# Patient Record
Sex: MALE | Race: ASIAN | NOT HISPANIC OR LATINO | Employment: OTHER | URBAN - METROPOLITAN AREA
[De-identification: names, ages, dates, MRNs, and addresses within clinical notes are randomized per-mention and may not be internally consistent; named-entity substitution may affect disease eponyms.]

---

## 2017-03-18 ENCOUNTER — HOME CARE/HOSPICE - HEALTHEAST (OUTPATIENT)
Dept: HOME HEALTH SERVICES | Facility: HOME HEALTH | Age: 67
End: 2017-03-18

## 2017-03-19 ENCOUNTER — HOME CARE/HOSPICE - HEALTHEAST (OUTPATIENT)
Dept: HOME HEALTH SERVICES | Facility: HOME HEALTH | Age: 67
End: 2017-03-19

## 2017-03-20 ENCOUNTER — HOME CARE/HOSPICE - HEALTHEAST (OUTPATIENT)
Dept: HOME HEALTH SERVICES | Facility: HOME HEALTH | Age: 67
End: 2017-03-20

## 2017-03-20 ENCOUNTER — COMMUNICATION - HEALTHEAST (OUTPATIENT)
Dept: NEUROSURGERY | Facility: CLINIC | Age: 67
End: 2017-03-20

## 2017-03-20 DIAGNOSIS — I61.0 BASAL GANGLIA HEMORRHAGE (H): ICD-10-CM

## 2017-03-21 ENCOUNTER — HOME CARE/HOSPICE - HEALTHEAST (OUTPATIENT)
Dept: HOME HEALTH SERVICES | Facility: HOME HEALTH | Age: 67
End: 2017-03-21

## 2017-03-24 ENCOUNTER — HOME CARE/HOSPICE - HEALTHEAST (OUTPATIENT)
Dept: HOME HEALTH SERVICES | Facility: HOME HEALTH | Age: 67
End: 2017-03-24

## 2017-03-27 ENCOUNTER — HOME CARE/HOSPICE - HEALTHEAST (OUTPATIENT)
Dept: HOME HEALTH SERVICES | Facility: HOME HEALTH | Age: 67
End: 2017-03-27

## 2017-03-29 ENCOUNTER — HOME CARE/HOSPICE - HEALTHEAST (OUTPATIENT)
Dept: HOME HEALTH SERVICES | Facility: HOME HEALTH | Age: 67
End: 2017-03-29

## 2017-03-31 ENCOUNTER — HOME CARE/HOSPICE - HEALTHEAST (OUTPATIENT)
Dept: HOME HEALTH SERVICES | Facility: HOME HEALTH | Age: 67
End: 2017-03-31

## 2017-03-31 ENCOUNTER — AMBULATORY - HEALTHEAST (OUTPATIENT)
Dept: NEUROSURGERY | Facility: CLINIC | Age: 67
End: 2017-03-31

## 2017-03-31 ENCOUNTER — HOSPITAL ENCOUNTER (OUTPATIENT)
Dept: CT IMAGING | Facility: CLINIC | Age: 67
Discharge: HOME | End: 2017-03-31
Attending: NEUROLOGICAL SURGERY

## 2017-03-31 ENCOUNTER — OFFICE VISIT - HEALTHEAST (OUTPATIENT)
Dept: NEUROSURGERY | Facility: CLINIC | Age: 67
End: 2017-03-31

## 2017-03-31 DIAGNOSIS — I61.0 BASAL GANGLIA HEMORRHAGE (H): ICD-10-CM

## 2017-03-31 ASSESSMENT — MIFFLIN-ST. JEOR: SCORE: 1564.59

## 2017-04-01 ENCOUNTER — HOME CARE/HOSPICE - HEALTHEAST (OUTPATIENT)
Dept: HOME HEALTH SERVICES | Facility: HOME HEALTH | Age: 67
End: 2017-04-01

## 2017-04-03 ENCOUNTER — HOME CARE/HOSPICE - HEALTHEAST (OUTPATIENT)
Dept: HOME HEALTH SERVICES | Facility: HOME HEALTH | Age: 67
End: 2017-04-03

## 2017-04-04 ENCOUNTER — HOME CARE/HOSPICE - HEALTHEAST (OUTPATIENT)
Dept: HOME HEALTH SERVICES | Facility: HOME HEALTH | Age: 67
End: 2017-04-04

## 2017-04-05 ENCOUNTER — HOME CARE/HOSPICE - HEALTHEAST (OUTPATIENT)
Dept: HOME HEALTH SERVICES | Facility: HOME HEALTH | Age: 67
End: 2017-04-05

## 2017-04-06 ENCOUNTER — HOME CARE/HOSPICE - HEALTHEAST (OUTPATIENT)
Dept: HOME HEALTH SERVICES | Facility: HOME HEALTH | Age: 67
End: 2017-04-06

## 2017-04-10 ENCOUNTER — HOME CARE/HOSPICE - HEALTHEAST (OUTPATIENT)
Dept: HOME HEALTH SERVICES | Facility: HOME HEALTH | Age: 67
End: 2017-04-10

## 2017-04-12 ENCOUNTER — HOME CARE/HOSPICE - HEALTHEAST (OUTPATIENT)
Dept: HOME HEALTH SERVICES | Facility: HOME HEALTH | Age: 67
End: 2017-04-12

## 2017-04-14 ENCOUNTER — HOME CARE/HOSPICE - HEALTHEAST (OUTPATIENT)
Dept: HOME HEALTH SERVICES | Facility: HOME HEALTH | Age: 67
End: 2017-04-14

## 2017-04-19 ENCOUNTER — HOME CARE/HOSPICE - HEALTHEAST (OUTPATIENT)
Dept: HOME HEALTH SERVICES | Facility: HOME HEALTH | Age: 67
End: 2017-04-19

## 2017-04-21 ENCOUNTER — HOME CARE/HOSPICE - HEALTHEAST (OUTPATIENT)
Dept: HOME HEALTH SERVICES | Facility: HOME HEALTH | Age: 67
End: 2017-04-21

## 2017-04-22 ENCOUNTER — HOME CARE/HOSPICE - HEALTHEAST (OUTPATIENT)
Dept: HOME HEALTH SERVICES | Facility: HOME HEALTH | Age: 67
End: 2017-04-22

## 2017-04-26 ENCOUNTER — HOME CARE/HOSPICE - HEALTHEAST (OUTPATIENT)
Dept: HOME HEALTH SERVICES | Facility: HOME HEALTH | Age: 67
End: 2017-04-26

## 2017-04-27 ENCOUNTER — HOME CARE/HOSPICE - HEALTHEAST (OUTPATIENT)
Dept: HOME HEALTH SERVICES | Facility: HOME HEALTH | Age: 67
End: 2017-04-27

## 2017-05-04 ENCOUNTER — HOME CARE/HOSPICE - HEALTHEAST (OUTPATIENT)
Dept: HOME HEALTH SERVICES | Facility: HOME HEALTH | Age: 67
End: 2017-05-04

## 2017-05-09 ENCOUNTER — COMMUNICATION - HEALTHEAST (OUTPATIENT)
Dept: HOME HEALTH SERVICES | Facility: HOME HEALTH | Age: 67
End: 2017-05-09

## 2017-05-30 ENCOUNTER — HOME CARE/HOSPICE - HEALTHEAST (OUTPATIENT)
Dept: HOME HEALTH SERVICES | Facility: HOME HEALTH | Age: 67
End: 2017-05-30

## 2017-06-01 ENCOUNTER — HOME CARE/HOSPICE - HEALTHEAST (OUTPATIENT)
Dept: HOME HEALTH SERVICES | Facility: HOME HEALTH | Age: 67
End: 2017-06-01

## 2017-06-04 ENCOUNTER — HOME CARE/HOSPICE - HEALTHEAST (OUTPATIENT)
Dept: HOME HEALTH SERVICES | Facility: HOME HEALTH | Age: 67
End: 2017-06-04

## 2017-06-05 ENCOUNTER — COMMUNICATION - HEALTHEAST (OUTPATIENT)
Dept: HOME HEALTH SERVICES | Facility: HOME HEALTH | Age: 67
End: 2017-06-05

## 2017-06-06 ENCOUNTER — HOME CARE/HOSPICE - HEALTHEAST (OUTPATIENT)
Dept: HOME HEALTH SERVICES | Facility: HOME HEALTH | Age: 67
End: 2017-06-06

## 2017-06-07 ENCOUNTER — HOME CARE/HOSPICE - HEALTHEAST (OUTPATIENT)
Dept: HOME HEALTH SERVICES | Facility: HOME HEALTH | Age: 67
End: 2017-06-07

## 2017-06-08 ENCOUNTER — HOME CARE/HOSPICE - HEALTHEAST (OUTPATIENT)
Dept: HOME HEALTH SERVICES | Facility: HOME HEALTH | Age: 67
End: 2017-06-08

## 2017-06-08 RX ORDER — ATORVASTATIN CALCIUM 10 MG/1
10 TABLET, FILM COATED ORAL AT BEDTIME
Status: SHIPPED | COMMUNITY
Start: 2017-06-08 | End: 2024-04-11

## 2017-06-09 ENCOUNTER — HOME CARE/HOSPICE - HEALTHEAST (OUTPATIENT)
Dept: HOME HEALTH SERVICES | Facility: HOME HEALTH | Age: 67
End: 2017-06-09

## 2017-06-16 ENCOUNTER — HOME CARE/HOSPICE - HEALTHEAST (OUTPATIENT)
Dept: HOME HEALTH SERVICES | Facility: HOME HEALTH | Age: 67
End: 2017-06-16

## 2017-06-17 ENCOUNTER — HOME CARE/HOSPICE - HEALTHEAST (OUTPATIENT)
Dept: HOME HEALTH SERVICES | Facility: HOME HEALTH | Age: 67
End: 2017-06-17

## 2017-06-20 ENCOUNTER — HOME CARE/HOSPICE - HEALTHEAST (OUTPATIENT)
Dept: HOME HEALTH SERVICES | Facility: HOME HEALTH | Age: 67
End: 2017-06-20

## 2017-06-21 ENCOUNTER — HOME CARE/HOSPICE - HEALTHEAST (OUTPATIENT)
Dept: HOME HEALTH SERVICES | Facility: HOME HEALTH | Age: 67
End: 2017-06-21

## 2017-06-23 ENCOUNTER — HOME CARE/HOSPICE - HEALTHEAST (OUTPATIENT)
Dept: HOME HEALTH SERVICES | Facility: HOME HEALTH | Age: 67
End: 2017-06-23

## 2017-06-24 ENCOUNTER — HOME CARE/HOSPICE - HEALTHEAST (OUTPATIENT)
Dept: HOME HEALTH SERVICES | Facility: HOME HEALTH | Age: 67
End: 2017-06-24

## 2017-06-25 ENCOUNTER — HOME CARE/HOSPICE - HEALTHEAST (OUTPATIENT)
Dept: HOME HEALTH SERVICES | Facility: HOME HEALTH | Age: 67
End: 2017-06-25

## 2017-06-27 ENCOUNTER — HOME CARE/HOSPICE - HEALTHEAST (OUTPATIENT)
Dept: HOME HEALTH SERVICES | Facility: HOME HEALTH | Age: 67
End: 2017-06-27

## 2017-06-30 ENCOUNTER — HOME CARE/HOSPICE - HEALTHEAST (OUTPATIENT)
Dept: HOME HEALTH SERVICES | Facility: HOME HEALTH | Age: 67
End: 2017-06-30

## 2017-07-01 ENCOUNTER — HOME CARE/HOSPICE - HEALTHEAST (OUTPATIENT)
Dept: HOME HEALTH SERVICES | Facility: HOME HEALTH | Age: 67
End: 2017-07-01

## 2017-07-03 ENCOUNTER — HOME CARE/HOSPICE - HEALTHEAST (OUTPATIENT)
Dept: HOME HEALTH SERVICES | Facility: HOME HEALTH | Age: 67
End: 2017-07-03

## 2017-07-06 ENCOUNTER — HOME CARE/HOSPICE - HEALTHEAST (OUTPATIENT)
Dept: HOME HEALTH SERVICES | Facility: HOME HEALTH | Age: 67
End: 2017-07-06

## 2017-07-07 ENCOUNTER — HOME CARE/HOSPICE - HEALTHEAST (OUTPATIENT)
Dept: HOME HEALTH SERVICES | Facility: HOME HEALTH | Age: 67
End: 2017-07-07

## 2020-05-12 ENCOUNTER — RECORDS - HEALTHEAST (OUTPATIENT)
Dept: ADMINISTRATIVE | Facility: OTHER | Age: 70
End: 2020-05-12

## 2020-05-27 ENCOUNTER — HOSPITAL ENCOUNTER (OUTPATIENT)
Dept: ULTRASOUND IMAGING | Facility: HOSPITAL | Age: 70
Discharge: HOME | End: 2020-05-27

## 2020-05-27 DIAGNOSIS — R42 DIZZINESS AND GIDDINESS: ICD-10-CM

## 2021-05-25 ENCOUNTER — RECORDS - HEALTHEAST (OUTPATIENT)
Dept: ADMINISTRATIVE | Facility: CLINIC | Age: 71
End: 2021-05-25

## 2021-05-30 VITALS — BODY MASS INDEX: 33.63 KG/M2 | WEIGHT: 197 LBS | HEIGHT: 64 IN

## 2021-06-02 ENCOUNTER — RECORDS - HEALTHEAST (OUTPATIENT)
Dept: ADMINISTRATIVE | Facility: CLINIC | Age: 71
End: 2021-06-02

## 2021-06-04 ENCOUNTER — RECORDS - HEALTHEAST (OUTPATIENT)
Dept: ADMINISTRATIVE | Facility: CLINIC | Age: 71
End: 2021-06-04

## 2021-06-09 NOTE — PROGRESS NOTES
CHART NOTE     DATE OF SERVICE:  3/31/2017     : 1950     ASSESSMENT :   Resolving R basal ganglia hemorrhage. Slowly improving function.      PLAN: No: ASA, NSAIDS (Ibuprofen, Aleve, Diclofenac, Celebrex etc.), Coumadin, or blood thinners for one month. Okay to take Tylenol. Continue all therapies. See  primary provider regularly and maintain good blood pressure control. Return to clinic only as needed.       HPI:  Sedrick Murray is status post CONSULT by Dr. Hinojosa on 3-16-17 for RIGHT BASAL GANGLIA HEMORRHAGE  Patient w/  medical history significant for hypertension, dyslipidemia, CAD, gout and history of hemorrhagic stroke who presented to the ED with Left facial droop and Left-sided weakness.  CT head showed Right basal ganglia bleed. MR COW done, no aneurysms noted.     TODAY, Sedrick Murray reports c/o persistent HA. Some nausea associated with eating. L sided weakness is improving, still weak. Cognition and memory not back to baseline, spouse agrees. Patient is at home with HHC, PT/OT, SLP.      PAST MEDICAL HISTORY, SURGICAL HISTORY, REVIEW OF SYMPTOMS, MEDICATIONS AND ALLERGIES:  Past medical history, surgical history, review of symptoms, medications and allergies remain unchanged.    Past Medical History:   Diagnosis Date     Anxiety      Chest pain      Chronic pain      Diarrhea      Dyslipidemia 2014     Gout      Hemorrhagic stroke      Hypertension        PHYSICAL EXAM:      Neurological exam reveals:  Recent and remote memory intact, fund of knowledge wnl.    Alert and oriented x3, speech fluent and appropriate.   PERRL, EOMI, No nystagmus, Face with mild left facial droop, tongue midline, Shoulder shrug equal  Comprehension intact with 2 step crossover command.   Finger nose finger smooth and accurate with RUE, L dysmetria  Arm strength grasp, biceps, triceps, and deltoids 5/5 RUE, weaker on LUE particularly grasp  Leg strength dorsiflexion, plantar flexion, and hip flexion 5/5 weaker on LLE  but good resistance.  Muscle Bulk and tone wnl.   Reflexes: No pathological reflexes   Gait and station:in WC not observed     RADIOGRAPHIC IMAGING:  Head CT: 1. Expected evolution of area of right basal ganglia hemorrhage. No finding for interval hemorrhage, mass, or acute infarct. Ventricles are stable in size.    Films personally reviewed.  Reviewed  with patient and family.

## 2021-06-15 PROBLEM — R53.1 LEFT-SIDED WEAKNESS: Status: ACTIVE | Noted: 2017-05-30

## 2021-06-15 PROBLEM — I61.0 BASAL GANGLIA HEMORRHAGE (H): Status: ACTIVE | Noted: 2017-03-15

## 2021-06-15 PROBLEM — I63.9 CVA (CEREBRAL VASCULAR ACCIDENT) (H): Status: ACTIVE | Noted: 2017-05-28

## 2021-06-16 PROBLEM — R55 SYNCOPE, UNSPECIFIED SYNCOPE TYPE: Status: ACTIVE | Noted: 2020-05-06

## 2021-06-16 PROBLEM — M10.9 ACUTE GOUT OF MULTIPLE SITES: Status: ACTIVE | Noted: 2017-06-01

## 2021-07-14 PROBLEM — I62.9 INTRACRANIAL HEMORRHAGE (H): Status: RESOLVED | Noted: 2017-03-15 | Resolved: 2017-03-16

## 2022-09-02 ENCOUNTER — APPOINTMENT (OUTPATIENT)
Dept: CT IMAGING | Facility: HOSPITAL | Age: 72
End: 2022-09-02
Payer: COMMERCIAL

## 2022-09-02 ENCOUNTER — HOSPITAL ENCOUNTER (EMERGENCY)
Facility: HOSPITAL | Age: 72
Discharge: HOME | End: 2022-09-02
Admitting: STUDENT IN AN ORGANIZED HEALTH CARE EDUCATION/TRAINING PROGRAM
Payer: COMMERCIAL

## 2022-09-02 ENCOUNTER — APPOINTMENT (OUTPATIENT)
Dept: RADIOLOGY | Facility: HOSPITAL | Age: 72
End: 2022-09-02
Attending: STUDENT IN AN ORGANIZED HEALTH CARE EDUCATION/TRAINING PROGRAM
Payer: COMMERCIAL

## 2022-09-02 VITALS
SYSTOLIC BLOOD PRESSURE: 162 MMHG | BODY MASS INDEX: 29.52 KG/M2 | DIASTOLIC BLOOD PRESSURE: 62 MMHG | WEIGHT: 166.67 LBS | TEMPERATURE: 98.4 F | OXYGEN SATURATION: 98 % | RESPIRATION RATE: 20 BRPM | HEART RATE: 58 BPM
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
## 2022-09-02 DIAGNOSIS — M25.522 LEFT ELBOW PAIN: ICD-10-CM

## 2022-09-02 PROCEDURE — 70450 CT HEAD/BRAIN W/O DYE: CPT

## 2022-09-02 PROCEDURE — 99284 EMERGENCY DEPT VISIT MOD MDM: CPT | Mod: 25

## 2022-09-02 PROCEDURE — 73080 X-RAY EXAM OF ELBOW: CPT | Mod: LT

## 2022-09-02 RX ORDER — OXYCODONE HYDROCHLORIDE 5 MG/1
5 TABLET ORAL ONCE
Status: DISCONTINUED | OUTPATIENT
Start: 2022-09-02 | End: 2022-09-02 | Stop reason: HOSPADM

## 2022-09-02 RX ORDER — OXYCODONE HYDROCHLORIDE 5 MG/1
5 TABLET ORAL EVERY 6 HOURS PRN
Qty: 8 TABLET | Refills: 0 | Status: SHIPPED | OUTPATIENT
Start: 2022-09-02 | End: 2024-04-11

## 2022-09-02 ASSESSMENT — ENCOUNTER SYMPTOMS
NAUSEA: 0
BACK PAIN: 0
HEADACHES: 0
FACIAL ASYMMETRY: 0
SHORTNESS OF BREATH: 0
WEAKNESS: 0
VOMITING: 0
DIZZINESS: 0
NUMBNESS: 0
ABDOMINAL PAIN: 0
NECK PAIN: 0

## 2022-09-02 NOTE — ED PROVIDER NOTES
ED Provider In Triage Note  [facility]  Encounter Date: Sep 2, 2022    No chief complaint on file.      Brief HPI:   Sedrick Murray is a 72 year old male presenting to the Emergency Department with a chief complaint of L elbow pain. Fell this morning    No head injury. Fell walking up the stairs        Brief Physical Exam:  BP (!) 162/62   Pulse 61   Temp 98.4  F (36.9  C) (Oral)   Resp 16   Wt 75.6 kg (166 lb 10.7 oz)   SpO2 96%   BMI 29.52 kg/m    General: Non-toxic appearing  HEENT: Atraumatic  Resp: No respiratory distress  Abdomen: Non-peritoneal  Neuro: Alert, oriented, answers questions appropriately  Psych: Behavior appropriate      Plan Initiated in Triage:  No orders of the defined types were placed in this encounter.      PIT Dispo:   Return to lobby while awaiting workup and ED bed availability    Chandu Cueva DO on 9/2/2022 at 4:25 PM    Patient was evaluated by the Physician in Triage due to a limitation of available rooms in the Emergency Department. A plan of care was discussed based on the information obtained on the initial evaluation and patient was consuled to return back to the Emergency Department lobby after this initial evalutaiton until results were obtained or a room became available in the Emergency Department. Patient was counseled not to leave prior to receiving the results of their workup.     Chandu Cueva DO  [facility] EMERGENCY DEPARTMENT  [address] [place] [id number]  [phone]     Chandu Cueva DO  09/02/22 4830

## 2022-09-02 NOTE — ED TRIAGE NOTES
Pt states he was walking upstairs this morning when his legs gave out and he fell down several stairs and landed on his left elbow. Pt is c/o left elbow pain and swelling. Pt states he has left-sided deficits from a 2017 CVA. Pt denies hitting head. Pt denies taking any blood thinners. Pt denies taking anything for the pain.     Triage Assessment     Row Name 09/02/22 1630       Triage Assessment (Adult)    Airway WDL WDL       Respiratory WDL    Respiratory WDL WDL       Skin Circulation/Temperature WDL    Skin Circulation/Temperature WDL WDL       Cardiac WDL    Cardiac WDL WDL       Peripheral/Neurovascular WDL    Peripheral Neurovascular WDL WDL       Cognitive/Neuro/Behavioral WDL    Cognitive/Neuro/Behavioral WDL WDL

## 2022-09-02 NOTE — ED PROVIDER NOTES
Emergency Department Encounter   NAME: Sedrick Murray ; AGE: 72 year old male ; YOB: 1950 ; MRN: [id number] ; PCP: Milvia Costa   ED PROVIDER: Alie Langston PA-C    Evaluation Date & Time:   No admission date for patient encounter.    CHIEF COMPLAINT:  Fall and Left elbow injury      Impression and Plan   MDM: Sedrick Murray is a 72 year old male with a pertinent history of hemorrhagic stroke, hypertension, gout, dyslipidemia, anxiety, who presents to the ED by private vehicle for evaluation of left elbow pain.  Patient presented to the emergency department today for evaluation of left elbow pain after he tripped down several stairs, fell forward, landed on his left elbow.  Fall was mechanical in nature.  He states that his left elbow broke the fall he does not believe that there was head impact or any other injuries.  He does have significant swelling and bruising overlying the olecranon.  Distal CMS is intact.  The entirety of the arm was palpated with no other areas of tenderness or deformity.  X-ray of the elbow did not show underlying fracture, subluxation, dislocation or joint effusion to suggest an occult fracture which is quite reassuring.  Patient encouraged to perform range of motion and is able to fully flex and extend at the elbow joint which is reassuring.  Patient placed in an Ace wrap as well as a sling and we discussed icing, elevation, Tylenol, and several tablets of oxycodone for pain.  I performed a thorough trauma exam on the patient which was otherwise unremarkable.  He states that he is on a blood thinner medication, however he is not sure of the name and I cannot find documentation of this on his medication list, however given the nature of the fall did recommend head CT which she was comfortable moving forward with.  This was negative for traumatic intracranial hemorrhage.  We reviewed concerning signs and symptoms return to the ER as well as follow-up in clinic.  He verbalized  understanding is comfortable with the plan.  Discharged home in good condition.  Plan to have his blood pressure rechecked at his follow-up appointment.    *All conversations today had using a professional [employer] interpretor.     *Patient examination and workup was initiated in triage due to inpatient hospital and Emergency Department bed shortage resulting in long waiting room wait times. Patient and/or guardian's consent was obtained.     ED COURSE:  4:58 PM I met and introduced myself to the patient. I gathered initial history and performed my physical exam. We discussed plan for initial workup.   7:29 PM rechecked the patient and updated him on results, plan for discharge, follow-up, and reasons to return to the ER.    At the conclusion of the encounter I discussed the results of all the tests and the disposition. The questions were answered. The patient or family acknowledged understanding and was agreeable with the care plan.    FINAL IMPRESSION:    ICD-10-CM    1. Fall, initial encounter  W19.XXXA    2. Left elbow pain  M25.522          MEDICATIONS GIVEN IN THE EMERGENCY DEPARTMENT:  Medications   oxyCODONE (ROXICODONE) tablet 5 mg (5 mg Oral Not Given 9/2/22 2021)         NEW PRESCRIPTIONS STARTED AT TODAY'S ED VISIT:  New Prescriptions    OXYCODONE (ROXICODONE) 5 MG TABLET    Take 1 tablet (5 mg) by mouth every 6 hours as needed for pain         HPI   Patient information was obtained from: Patient    Use of Intrepreter: Yes - Sahra interpretor - Nicole     Sedrick Murray is a 72 year old male with a pertinent history of hemorrhagic stroke, hypertension, gout, dyslipidemia, anxiety, who presents to the ED by private vehicle for evaluation of left elbow pain.     The patient presents to the emergency department after he fell down the stairs and injured his elbow.  He states he has chronic left-sided weakness after previous stroke, and he accidentally tripped when walking down the stairs.  He tripped down  several stairs, ultimately leaning forward with the majority of his weight on his left elbow on the pavement.  He denies hitting his head, headache, or LOC.  He has not had any visual or speech changes, vomiting, arm or leg weakness or numbness, or neck pain since injury.  He denies any pain into the shoulder, chest, or back and just has localized pain to the elbow.  No radiation of pain into the forearm, wrist, or hand.  He is right-hand dominant.  Patient states he is on a blood thinner medication though does not know the name of the medicine.  He did not take it this morning.      REVIEW OF SYSTEMS:  Review of Systems   Eyes: Negative for visual disturbance.   Respiratory: Negative for shortness of breath.    Cardiovascular: Negative for chest pain.   Gastrointestinal: Negative for abdominal pain, nausea and vomiting.   Musculoskeletal: Negative for back pain and neck pain.        Positive for left elbow pain.    Neurological: Negative for dizziness, syncope, facial asymmetry, weakness, numbness and headaches.   All other systems reviewed and are negative.        Medical History     Past Medical History:   Diagnosis Date     Anxiety      Back pain      Cerebral vascular accident (H)      Chest pain      Chronic pain      Diarrhea      Dyslipidemia 9/1/2014     Gout      Hemorrhagic stroke (H)      Hypertension        Past Surgical History:   Procedure Laterality Date     CHOLECYSTECTOMY       IR MISCELLANEOUS PROCEDURE  3/9/2013     IR MISCELLANEOUS PROCEDURE  3/9/2013       Family History   Problem Relation Age of Onset     Cerebrovascular Disease Mother        Social History     Tobacco Use     Smoking status: Never Smoker   Substance Use Topics     Alcohol use: Yes     Drug use: No       oxyCODONE (ROXICODONE) 5 MG tablet  amLODIPine (NORVASC) 5 MG tablet  aspirin 81 MG EC tablet  atorvastatin (LIPITOR) 10 MG tablet  oxyCODONE (ROXICODONE) 5 MG immediate release tablet          Physical Exam     First  Vitals:  Patient Vitals for the past 24 hrs:   BP Temp Temp src Pulse Resp SpO2 Weight   09/02/22 1629 (!) 162/62 98.4  F (36.9  C) Oral 61 16 96 % 75.6 kg (166 lb 10.7 oz)         PHYSICAL EXAM:   Physical Exam  Vitals and nursing note reviewed.   Constitutional:       General: He is not in acute distress.     Appearance: Normal appearance. He is not toxic-appearing.   HENT:      Head: Normocephalic and atraumatic.      Right Ear: Tympanic membrane normal.      Left Ear: Tympanic membrane normal.      Mouth/Throat:      Mouth: Mucous membranes are moist.      Pharynx: Oropharynx is clear.   Eyes:      Extraocular Movements: Extraocular movements intact.      Conjunctiva/sclera: Conjunctivae normal.      Pupils: Pupils are equal, round, and reactive to light.   Neck:      Comments: No midline cervical spinal tenderness or palpable bony step-offs.  Forage motion of the neck without difficulty or pain.  No pain with axial loading.  Cardiovascular:      Rate and Rhythm: Normal rate and regular rhythm.      Heart sounds: Normal heart sounds.   Pulmonary:      Effort: Pulmonary effort is normal.      Breath sounds: Normal breath sounds.   Chest:      Chest wall: No tenderness.   Abdominal:      General: Abdomen is flat. There is no distension.      Palpations: Abdomen is soft.      Tenderness: There is no abdominal tenderness. There is no guarding or rebound.   Musculoskeletal:      Cervical back: Normal range of motion and neck supple.      Comments: No midline spinal tenderness or palpable bony step-offs.  Pelvis is stable.  Left elbow is significantly swollen with ecchymosis and diffuse tenderness over the olecranon.  Arm held in a flexed position at the elbow joint.  Extension deferred due to his pain.  No tenderness into the shoulder joint or humerus.  No forearm, wrist, or hand tenderness.  2+ radial pulses and distal cap refill is less than 2 seconds to all fingers.  Hand is warm.  Sensation to light touch intact  to all fingers.  5 out of 5 strength with .  No open wounds.   Skin:     General: Skin is warm and dry.   Neurological:      Mental Status: He is alert and oriented to person, place, and time. Mental status is at baseline.      GCS: GCS eye subscore is 4. GCS verbal subscore is 5. GCS motor subscore is 6.      Cranial Nerves: No facial asymmetry.      Comments: Normal speech.  No appreciated dysarthria or aphasia.  Cranial nerves III through XII intact.  Sensation to light touch intact to face and all extremities.             Results     LAB:  All pertinent labs reviewed and interpreted  Labs Ordered and Resulted from Time of ED Arrival to Time of ED Departure - No data to display    RADIOLOGY:  Head CT w/o contrast   Final Result   IMPRESSION:   1.  No acute intracranial process.      Elbow  XR, G/E 3 views, left   Final Result   IMPRESSION:    1.  No fracture or joint malalignment.   2.  Advanced left elbow degenerative arthrosis.   3.  Multiple degenerative ossicles at the medial, anterior, and lateral margin of the elbow joint. Olecranon spur.           Alie Langston PA-C   Emergency Medicine   [facility] EMERGENCY DEPARTMENT       Alie Langston PA-C  09/02/22 2025

## 2022-09-03 NOTE — DISCHARGE INSTRUCTIONS
As we discussed, your x-ray shows no broken bones, however you do have a bruised elbow.  Please elevate the arm above heart level when able, ice the elbow for 20 minutes every 2-3 hours, and use Tylenol as needed for discomfort.  I would also keep the area compressed with an Ace wrap.  We will send you home with a sling though I would like you to take the arm out of the sling several times a day and perform range of motion exercises of your shoulder to avoid stiffening of her muscles.  I will send you home with several tablets of oxycodone which is a strong pain medication for breakthrough pain.  This medicine can make you drowsy.  Please do not take this while working, driving, or operating heavy machinery.    Your blood pressure was elevated in the emergencydepartment today and requires recheck and close follow-up in your primary care clinic. Untreated blood pressure can cause serious complications including, but not limited to stroke, heart attack/failure, and kidney disease.  Please make a close follow-up appointment to have this recheck performed. Please return to the emergency department immediately if you develop a severe headache, vision changes, chest pain, shortness of breath, orabdominal pain.

## 2023-11-13 ENCOUNTER — ALLIED HEALTH/NURSE VISIT (OUTPATIENT)
Dept: FAMILY MEDICINE | Facility: CLINIC | Age: 73
End: 2023-11-13
Payer: COMMERCIAL

## 2023-11-13 DIAGNOSIS — Z23 NEED FOR PROPHYLACTIC VACCINATION AGAINST HEPATITIS B VIRUS: ICD-10-CM

## 2023-11-13 PROCEDURE — G0010 ADMIN HEPATITIS B VACCINE: HCPCS

## 2023-11-13 PROCEDURE — 99207 PR NO CHARGE NURSE ONLY: CPT

## 2023-11-13 PROCEDURE — 90480 ADMN SARSCOV2 VAC 1/ONLY CMP: CPT

## 2023-11-13 PROCEDURE — 90746 HEPB VACCINE 3 DOSE ADULT IM: CPT

## 2023-11-13 PROCEDURE — 91320 SARSCV2 VAC 30MCG TRS-SUC IM: CPT

## 2023-11-13 NOTE — PROGRESS NOTES
Prior to immunization administration, verified patients identity using patient s name and date of birth. Please see Immunization Activity for additional information.     Screening Questionnaire for Adult Immunization    Are you sick today?   No   Do you have allergies to medications, food, a vaccine component or latex?   No   Have you ever had a serious reaction after receiving a vaccination?   No   Do you have a long-term health problem with heart, lung, kidney, or metabolic disease (e.g., diabetes), asthma, a blood disorder, no spleen, complement component deficiency, a cochlear implant, or a spinal fluid leak?  Are you on long-term aspirin therapy?   No   Do you have cancer, leukemia, HIV/AIDS, or any other immune system problem?   No   Do you have a parent, brother, or sister with an immune system problem?   No   In the past 3 months, have you taken medications that affect  your immune system, such as prednisone, other steroids, or anticancer drugs; drugs for the treatment of rheumatoid arthritis, Crohn s disease, or psoriasis; or have you had radiation treatments?   No   Have you had a seizure, or a brain or other nervous system problem?   No   During the past year, have you received a transfusion of blood or blood    products, or been given immune (gamma) globulin or antiviral drug?   No   For women: Are you pregnant or is there a chance you could become       pregnant during the next month?   No   Have you received any vaccinations in the past 4 weeks?   No     Immunization questionnaire answers were all negative.    I have reviewed the following standing orders:   This patient is due and qualifies for the Hepatitis B vaccine.    Click here for Hepatitis B Standing Order    I have reviewed the vaccines inclusion and exclusion criteria; Spoke with daughter, she stated they were to get Hep B here in clinic and will get the other requested vaccines at [facility]    Patient instructed to remain in clinic for 15  minutes afterwards, and to report any adverse reactions.     Screening performed by Helena Thomas MA on 11/13/2023 at 9:31 AM.

## 2023-12-26 ENCOUNTER — TRANSFERRED RECORDS (OUTPATIENT)
Dept: HEALTH INFORMATION MANAGEMENT | Facility: CLINIC | Age: 73
End: 2023-12-26

## 2024-04-11 ENCOUNTER — APPOINTMENT (OUTPATIENT)
Dept: CT IMAGING | Facility: HOSPITAL | Age: 74
DRG: 280 | End: 2024-04-11
Attending: EMERGENCY MEDICINE
Payer: COMMERCIAL

## 2024-04-11 ENCOUNTER — HOSPITAL ENCOUNTER (INPATIENT)
Facility: HOSPITAL | Age: 74
LOS: 4 days | Discharge: HOME | DRG: 280 | End: 2024-04-15
Attending: EMERGENCY MEDICINE | Admitting: INTERNAL MEDICINE
Payer: COMMERCIAL

## 2024-04-11 ENCOUNTER — APPOINTMENT (OUTPATIENT)
Dept: CARDIOLOGY | Facility: HOSPITAL | Age: 74
DRG: 280 | End: 2024-04-11
Attending: INTERNAL MEDICINE
Payer: COMMERCIAL

## 2024-04-11 DIAGNOSIS — I21.4 NSTEMI (NON-ST ELEVATED MYOCARDIAL INFARCTION) (H): ICD-10-CM

## 2024-04-11 DIAGNOSIS — R07.9 ACUTE CHEST PAIN: ICD-10-CM

## 2024-04-11 DIAGNOSIS — R09.02 HYPOXIA: ICD-10-CM

## 2024-04-11 DIAGNOSIS — I50.31 ACUTE DIASTOLIC HEART FAILURE (H): Primary | ICD-10-CM

## 2024-04-11 PROBLEM — Z86.73 HISTORY OF CVA (CEREBROVASCULAR ACCIDENT): Status: ACTIVE | Noted: 2024-04-11

## 2024-04-11 LAB
ANION GAP SERPL CALCULATED.3IONS-SCNC: 11 MMOL/L (ref 7–15)
APTT PPP: 27 SECONDS (ref 22–38)
BASOPHILS # BLD AUTO: 0 10E3/UL (ref 0–0.2)
BASOPHILS NFR BLD AUTO: 0 %
BUN SERPL-MCNC: 30.2 MG/DL (ref 8–23)
CALCIUM SERPL-MCNC: 8.5 MG/DL (ref 8.8–10.2)
CHLORIDE SERPL-SCNC: 101 MMOL/L (ref 98–107)
CHOLEST SERPL-MCNC: 210 MG/DL
CREAT SERPL-MCNC: 1.04 MG/DL (ref 0.67–1.17)
DEPRECATED HCO3 PLAS-SCNC: 26 MMOL/L (ref 22–29)
EGFRCR SERPLBLD CKD-EPI 2021: 76 ML/MIN/1.73M2
EOSINOPHIL # BLD AUTO: 0.1 10E3/UL (ref 0–0.7)
EOSINOPHIL NFR BLD AUTO: 1 %
ERYTHROCYTE [DISTWIDTH] IN BLOOD BY AUTOMATED COUNT: 13.2 % (ref 10–15)
GLUCOSE SERPL-MCNC: 226 MG/DL (ref 70–99)
HCT VFR BLD AUTO: 49.2 % (ref 40–53)
HDLC SERPL-MCNC: 80 MG/DL
HGB BLD-MCNC: 16.1 G/DL (ref 13.3–17.7)
HOLD SPECIMEN: NORMAL
HOLD SPECIMEN: NORMAL
IMM GRANULOCYTES # BLD: 0.2 10E3/UL
IMM GRANULOCYTES NFR BLD: 2 %
INR PPP: 0.99 (ref 0.85–1.15)
LACTATE SERPL-SCNC: 1.2 MMOL/L (ref 0.7–2)
LDLC SERPL CALC-MCNC: 61 MG/DL
LIPASE SERPL-CCNC: 183 U/L (ref 13–60)
LVEF ECHO: NORMAL
LYMPHOCYTES # BLD AUTO: 0.8 10E3/UL (ref 0.8–5.3)
LYMPHOCYTES NFR BLD AUTO: 7 %
MCH RBC QN AUTO: 30.7 PG (ref 26.5–33)
MCHC RBC AUTO-ENTMCNC: 32.7 G/DL (ref 31.5–36.5)
MCV RBC AUTO: 94 FL (ref 78–100)
MONOCYTES # BLD AUTO: 0.8 10E3/UL (ref 0–1.3)
MONOCYTES NFR BLD AUTO: 6 %
NEUTROPHILS # BLD AUTO: 10.5 10E3/UL (ref 1.6–8.3)
NEUTROPHILS NFR BLD AUTO: 84 %
NONHDLC SERPL-MCNC: 130 MG/DL
NRBC # BLD AUTO: 0 10E3/UL
NRBC BLD AUTO-RTO: 0 /100
NT-PROBNP SERPL-MCNC: 1801 PG/ML (ref 0–900)
PLATELET # BLD AUTO: 122 10E3/UL (ref 150–450)
POTASSIUM SERPL-SCNC: 4.1 MMOL/L (ref 3.4–5.3)
RBC # BLD AUTO: 5.25 10E6/UL (ref 4.4–5.9)
SODIUM SERPL-SCNC: 138 MMOL/L (ref 135–145)
TRIGL SERPL-MCNC: 343 MG/DL
TROPONIN T SERPL HS-MCNC: 47 NG/L
TROPONIN T SERPL HS-MCNC: 49 NG/L
TROPONIN T SERPL HS-MCNC: 60 NG/L
UFH PPP CHRO-ACNC: 0.82 IU/ML
WBC # BLD AUTO: 12.4 10E3/UL (ref 4–11)

## 2024-04-11 PROCEDURE — 120N000004 HC R&B MS OVERFLOW

## 2024-04-11 PROCEDURE — 250N000013 HC RX MED GY IP 250 OP 250 PS 637: Performed by: INTERNAL MEDICINE

## 2024-04-11 PROCEDURE — 80048 BASIC METABOLIC PNL TOTAL CA: CPT | Performed by: EMERGENCY MEDICINE

## 2024-04-11 PROCEDURE — 250N000011 HC RX IP 250 OP 636: Performed by: INTERNAL MEDICINE

## 2024-04-11 PROCEDURE — 83605 ASSAY OF LACTIC ACID: CPT | Performed by: INTERNAL MEDICINE

## 2024-04-11 PROCEDURE — 250N000013 HC RX MED GY IP 250 OP 250 PS 637: Performed by: EMERGENCY MEDICINE

## 2024-04-11 PROCEDURE — 80061 LIPID PANEL: CPT | Performed by: INTERNAL MEDICINE

## 2024-04-11 PROCEDURE — 84484 ASSAY OF TROPONIN QUANT: CPT | Performed by: INTERNAL MEDICINE

## 2024-04-11 PROCEDURE — 85730 THROMBOPLASTIN TIME PARTIAL: CPT | Performed by: EMERGENCY MEDICINE

## 2024-04-11 PROCEDURE — 99223 1ST HOSP IP/OBS HIGH 75: CPT | Performed by: INTERNAL MEDICINE

## 2024-04-11 PROCEDURE — 36415 COLL VENOUS BLD VENIPUNCTURE: CPT | Performed by: INTERNAL MEDICINE

## 2024-04-11 PROCEDURE — 85610 PROTHROMBIN TIME: CPT | Performed by: EMERGENCY MEDICINE

## 2024-04-11 PROCEDURE — 85520 HEPARIN ASSAY: CPT | Performed by: INTERNAL MEDICINE

## 2024-04-11 PROCEDURE — 93306 TTE W/DOPPLER COMPLETE: CPT | Mod: 26 | Performed by: INTERNAL MEDICINE

## 2024-04-11 PROCEDURE — 71275 CT ANGIOGRAPHY CHEST: CPT

## 2024-04-11 PROCEDURE — 93005 ELECTROCARDIOGRAM TRACING: CPT | Performed by: EMERGENCY MEDICINE

## 2024-04-11 PROCEDURE — 85041 AUTOMATED RBC COUNT: CPT | Performed by: EMERGENCY MEDICINE

## 2024-04-11 PROCEDURE — 250N000011 HC RX IP 250 OP 636: Performed by: EMERGENCY MEDICINE

## 2024-04-11 PROCEDURE — 96374 THER/PROPH/DIAG INJ IV PUSH: CPT | Mod: 59

## 2024-04-11 PROCEDURE — 83880 ASSAY OF NATRIURETIC PEPTIDE: CPT | Performed by: EMERGENCY MEDICINE

## 2024-04-11 PROCEDURE — 84484 ASSAY OF TROPONIN QUANT: CPT | Performed by: EMERGENCY MEDICINE

## 2024-04-11 PROCEDURE — 36415 COLL VENOUS BLD VENIPUNCTURE: CPT | Performed by: EMERGENCY MEDICINE

## 2024-04-11 PROCEDURE — 99285 EMERGENCY DEPT VISIT HI MDM: CPT

## 2024-04-11 PROCEDURE — 83690 ASSAY OF LIPASE: CPT | Performed by: EMERGENCY MEDICINE

## 2024-04-11 PROCEDURE — 74177 CT ABD & PELVIS W/CONTRAST: CPT

## 2024-04-11 PROCEDURE — 93306 TTE W/DOPPLER COMPLETE: CPT

## 2024-04-11 RX ORDER — ONDANSETRON 2 MG/ML
4 INJECTION INTRAMUSCULAR; INTRAVENOUS EVERY 6 HOURS PRN
Status: DISCONTINUED | OUTPATIENT
Start: 2024-04-11 | End: 2024-04-15 | Stop reason: HOSPADM

## 2024-04-11 RX ORDER — ACETAMINOPHEN 325 MG/1
650 TABLET ORAL EVERY 4 HOURS PRN
Status: DISCONTINUED | OUTPATIENT
Start: 2024-04-11 | End: 2024-04-15 | Stop reason: HOSPADM

## 2024-04-11 RX ORDER — ASPIRIN 81 MG/1
81 TABLET ORAL DAILY
COMMUNITY
Start: 2024-01-08

## 2024-04-11 RX ORDER — PROCHLORPERAZINE 25 MG
12.5 SUPPOSITORY, RECTAL RECTAL EVERY 12 HOURS PRN
Status: DISCONTINUED | OUTPATIENT
Start: 2024-04-11 | End: 2024-04-15 | Stop reason: HOSPADM

## 2024-04-11 RX ORDER — HYDRALAZINE HYDROCHLORIDE 20 MG/ML
10 INJECTION INTRAMUSCULAR; INTRAVENOUS EVERY 4 HOURS PRN
Status: DISCONTINUED | OUTPATIENT
Start: 2024-04-11 | End: 2024-04-15 | Stop reason: HOSPADM

## 2024-04-11 RX ORDER — ONDANSETRON 4 MG/1
4 TABLET, ORALLY DISINTEGRATING ORAL EVERY 6 HOURS PRN
Status: DISCONTINUED | OUTPATIENT
Start: 2024-04-11 | End: 2024-04-15 | Stop reason: HOSPADM

## 2024-04-11 RX ORDER — AMOXICILLIN 250 MG
2 CAPSULE ORAL 2 TIMES DAILY PRN
Status: DISCONTINUED | OUTPATIENT
Start: 2024-04-11 | End: 2024-04-15 | Stop reason: HOSPADM

## 2024-04-11 RX ORDER — FENTANYL CITRATE 50 UG/ML
50 INJECTION, SOLUTION INTRAMUSCULAR; INTRAVENOUS ONCE
Status: COMPLETED | OUTPATIENT
Start: 2024-04-11 | End: 2024-04-11

## 2024-04-11 RX ORDER — ROSUVASTATIN CALCIUM 20 MG/1
20 TABLET, COATED ORAL AT BEDTIME
COMMUNITY
Start: 2023-05-09 | End: 2024-04-26

## 2024-04-11 RX ORDER — TRAZODONE HYDROCHLORIDE 150 MG/1
150 TABLET ORAL
COMMUNITY

## 2024-04-11 RX ORDER — CALCIUM CARBONATE 500 MG/1
1000 TABLET, CHEWABLE ORAL 4 TIMES DAILY PRN
Status: DISCONTINUED | OUTPATIENT
Start: 2024-04-11 | End: 2024-04-15 | Stop reason: HOSPADM

## 2024-04-11 RX ORDER — AMOXICILLIN 250 MG
1 CAPSULE ORAL 2 TIMES DAILY PRN
Status: DISCONTINUED | OUTPATIENT
Start: 2024-04-11 | End: 2024-04-15 | Stop reason: HOSPADM

## 2024-04-11 RX ORDER — IOPAMIDOL 755 MG/ML
90 INJECTION, SOLUTION INTRAVASCULAR ONCE
Status: COMPLETED | OUTPATIENT
Start: 2024-04-11 | End: 2024-04-11

## 2024-04-11 RX ORDER — HEPARIN SODIUM 10000 [USP'U]/100ML
0-5000 INJECTION, SOLUTION INTRAVENOUS CONTINUOUS
Status: DISCONTINUED | OUTPATIENT
Start: 2024-04-11 | End: 2024-04-12

## 2024-04-11 RX ORDER — AMLODIPINE BESYLATE 10 MG/1
10 TABLET ORAL DAILY
COMMUNITY
Start: 2024-01-08

## 2024-04-11 RX ORDER — PROCHLORPERAZINE MALEATE 5 MG
5 TABLET ORAL EVERY 6 HOURS PRN
Status: DISCONTINUED | OUTPATIENT
Start: 2024-04-11 | End: 2024-04-15 | Stop reason: HOSPADM

## 2024-04-11 RX ORDER — SENNOSIDES 8.6 MG
650 CAPSULE ORAL EVERY 8 HOURS PRN
COMMUNITY

## 2024-04-11 RX ORDER — CETIRIZINE HYDROCHLORIDE 10 MG/1
10 TABLET ORAL DAILY
COMMUNITY
End: 2024-04-11

## 2024-04-11 RX ORDER — LOSARTAN POTASSIUM 100 MG/1
100 TABLET ORAL DAILY
Status: ON HOLD | COMMUNITY
Start: 2024-02-01 | End: 2024-04-15

## 2024-04-11 RX ORDER — ALLOPURINOL 300 MG/1
300 TABLET ORAL 2 TIMES DAILY
COMMUNITY

## 2024-04-11 RX ORDER — ACETAMINOPHEN 650 MG/1
650 SUPPOSITORY RECTAL EVERY 4 HOURS PRN
Status: DISCONTINUED | OUTPATIENT
Start: 2024-04-11 | End: 2024-04-15 | Stop reason: HOSPADM

## 2024-04-11 RX ORDER — ASPIRIN 81 MG/1
324 TABLET, CHEWABLE ORAL ONCE
Status: COMPLETED | OUTPATIENT
Start: 2024-04-11 | End: 2024-04-11

## 2024-04-11 RX ORDER — MORPHINE SULFATE 2 MG/ML
2 INJECTION, SOLUTION INTRAMUSCULAR; INTRAVENOUS
Status: DISCONTINUED | OUTPATIENT
Start: 2024-04-11 | End: 2024-04-15 | Stop reason: HOSPADM

## 2024-04-11 RX ORDER — FUROSEMIDE 10 MG/ML
40 INJECTION INTRAMUSCULAR; INTRAVENOUS 2 TIMES DAILY
Qty: 8 ML | Refills: 0 | Status: COMPLETED | OUTPATIENT
Start: 2024-04-11 | End: 2024-04-12

## 2024-04-11 RX ORDER — HYDROCHLOROTHIAZIDE 25 MG/1
50 TABLET ORAL DAILY
Status: ON HOLD | COMMUNITY
End: 2024-04-15

## 2024-04-11 RX ORDER — LANOLIN ALCOHOL/MO/W.PET/CERES
3 CREAM (GRAM) TOPICAL
Status: DISCONTINUED | OUTPATIENT
Start: 2024-04-11 | End: 2024-04-15 | Stop reason: HOSPADM

## 2024-04-11 RX ORDER — POLYETHYLENE GLYCOL 3350 17 G/17G
17 POWDER, FOR SOLUTION ORAL 2 TIMES DAILY PRN
Status: DISCONTINUED | OUTPATIENT
Start: 2024-04-11 | End: 2024-04-15 | Stop reason: HOSPADM

## 2024-04-11 RX ORDER — NITROGLYCERIN 0.4 MG/1
0.4 TABLET SUBLINGUAL EVERY 5 MIN PRN
Status: DISCONTINUED | OUTPATIENT
Start: 2024-04-11 | End: 2024-04-15 | Stop reason: HOSPADM

## 2024-04-11 RX ADMIN — ACETAMINOPHEN 650 MG: 325 TABLET ORAL at 19:03

## 2024-04-11 RX ADMIN — CALCIUM CARBONATE (ANTACID) CHEW TAB 500 MG 1000 MG: 500 CHEW TAB at 21:23

## 2024-04-11 RX ADMIN — HYDRALAZINE HYDROCHLORIDE 10 MG: 20 INJECTION INTRAMUSCULAR; INTRAVENOUS at 19:39

## 2024-04-11 RX ADMIN — IOPAMIDOL 90 ML: 755 INJECTION, SOLUTION INTRAVENOUS at 14:05

## 2024-04-11 RX ADMIN — HEPARIN SODIUM 1000 UNITS/HR: 10000 INJECTION, SOLUTION INTRAVENOUS at 16:38

## 2024-04-11 RX ADMIN — NITROGLYCERIN 0.4 MG: 0.4 TABLET, ORALLY DISINTEGRATING SUBLINGUAL at 20:08

## 2024-04-11 RX ADMIN — FUROSEMIDE 40 MG: 10 INJECTION, SOLUTION INTRAMUSCULAR; INTRAVENOUS at 18:49

## 2024-04-11 RX ADMIN — FENTANYL CITRATE 50 MCG: 50 INJECTION, SOLUTION INTRAMUSCULAR; INTRAVENOUS at 13:41

## 2024-04-11 RX ADMIN — ASPIRIN 81 MG CHEWABLE TABLET 324 MG: 81 TABLET CHEWABLE at 13:11

## 2024-04-11 ASSESSMENT — ACTIVITIES OF DAILY LIVING (ADL)
ADLS_ACUITY_SCORE: 41
ADLS_ACUITY_SCORE: 35
DEPENDENT_IADLS:: CLEANING;COOKING;LAUNDRY;SHOPPING;MEAL PREPARATION;MEDICATION MANAGEMENT;TRANSPORTATION
ADLS_ACUITY_SCORE: 35
ADLS_ACUITY_SCORE: 35
ADLS_ACUITY_SCORE: 41
ADLS_ACUITY_SCORE: 37
ADLS_ACUITY_SCORE: 35
ADLS_ACUITY_SCORE: 33
ADLS_ACUITY_SCORE: 37

## 2024-04-11 ASSESSMENT — COLUMBIA-SUICIDE SEVERITY RATING SCALE - C-SSRS
1. IN THE PAST MONTH, HAVE YOU WISHED YOU WERE DEAD OR WISHED YOU COULD GO TO SLEEP AND NOT WAKE UP?: NO
2. HAVE YOU ACTUALLY HAD ANY THOUGHTS OF KILLING YOURSELF IN THE PAST MONTH?: NO
6. HAVE YOU EVER DONE ANYTHING, STARTED TO DO ANYTHING, OR PREPARED TO DO ANYTHING TO END YOUR LIFE?: NO

## 2024-04-11 NOTE — H&P
[facility]    History and Physical - Hospitalist Service       Date of Admission:  4/11/2024    Assessment & Plan   Active Problems:    Hypertension    Arteriosclerotic cardiovascular disease (ASCVD)    CAD (coronary artery disease)    Chronic gout of multiple sites, unspecified cause    NSTEMI (non-ST elevated myocardial infarction) (H)    History of CVA (cerebrovascular accident)    Dyslipidemia       Sedrick Murray is a 73 year old male with history of prior CVA, CAD, diastolic CHF, aortic valve disease, gout, admitted on 4/11/2024 with NSTEMI    NSTEMI:  Concern for acute CHF exacerbation  History of CAD, aortic regurgitation, HFpEF  Presents with chest and lower abdominal pain and found to have elevated troponin with T wave inversions on EKG. BNP elevated.   CT chest negative for PE or dissection  There was incidental finding of ulceration of the proximal SMA plaque at was seen on prior study done in 2020.  There is a incidental finding of advanced atherosclerotic disease with 3.9 x 3.1 cm infrarenal abdominal aortic aneurysm which has slightly increased in size.   Review of prior coronary angiogram notable for LAD and distal circ disease  -- start IV heparin  -- check TTE  -- cardiology consultation  -- verify home medications, continue ASA and statin and home antihypertensives  -- check lipids      Upper abdominal pain  Lipase mildly elevated but CT abdomen negative for pancreatitis.  CT does show ulceration of the proximal SMA plaque that was seen on prior study done in 2020.  There is a incidental finding of advanced atherosclerotic disease with 3.9 x 3.1 cm infrarenal abdominal aortic aneurysm which has slightly increased in size.   -- check lactic acid   -- consider inpatient vascular surgery evaluation however findings seem chronic and could be suitable for outpatient referral  -- pain control  -- try GI cocktail      Prior stroke:   -- verify home meds      H/O gout: verify home  "meds       Diet: NPO for Medical/Clinical Reasons Except for: Ice Chips, Meds    DVT Prophylaxis: IV heparin  Eugene Catheter: Not present  Lines: None     Cardiac Monitoring: ACTIVE order. Indication: AMI (NSTEMI/ STEMI) (48 hours)  Code Status: Full Code      Clinically Significant Risk Factors Present on Admission                # Drug Induced Platelet Defect: home medication list includes an antiplatelet medication   # Hypertension: Noted on problem list      # Obesity: Estimated body mass index is 33.3 kg/m  as calculated from the following:    Height as of this encounter: 1.6 m (5' 3\").    Weight as of this encounter: 85.3 kg (188 lb).              Disposition Plan      Expected Discharge Date: 04/13/2024             Medically Ready for Discharge: Anticipated in 2-4 Days      Krish Linn DO  Hospitalist Service  [facility]  Securely message with [employer] (more info)  Text page via [employer] Paging/Directory     ______________________________________________________________________    Chief Complaint   Chest pain    History is obtained from the patient    History of Present Illness   Sedrick Murray is a 73 year old male who presents with chest pain, abdominal pain, SOB, BRAVO and increased LE edema for the last several days to weeks.   He has associated orthopnea      Past Medical History    Past Medical History:   Diagnosis Date    Anxiety     Back pain     Cerebral vascular accident (H)     Chest pain     Chronic pain     Diarrhea     Dyslipidemia 9/1/2014    Gout     Hemorrhagic stroke (H)     Hypertension        Past Surgical History   Past Surgical History:   Procedure Laterality Date    CHOLECYSTECTOMY      IR MISCELLANEOUS PROCEDURE  3/9/2013    IR MISCELLANEOUS PROCEDURE  3/9/2013       Prior to Admission Medications   Prior to Admission Medications   Prescriptions Last Dose Informant Patient Reported? Taking?   amLODIPine (NORVASC) 5 MG tablet   No No   Sig: [AMLODIPINE (NORVASC) " 5 MG TABLET] Take 1 tablet (5 mg total) by mouth daily.   aspirin 81 MG EC tablet   No No   Sig: [ASPIRIN 81 MG EC TABLET] Take 1 tablet (81 mg total) by mouth daily.   atorvastatin (LIPITOR) 10 MG tablet   Yes No   Sig: [ATORVASTATIN (LIPITOR) 10 MG TABLET] Take 10 mg by mouth at bedtime. Indications: hypercholesterolemia   oxyCODONE (ROXICODONE) 5 MG immediate release tablet   No No   Sig: [OXYCODONE (ROXICODONE) 5 MG IMMEDIATE RELEASE TABLET] Take 0.5 tablets (2.5 mg total) by mouth every 3 (three) hours as needed.   oxyCODONE (ROXICODONE) 5 MG tablet   No No   Sig: Take 1 tablet (5 mg) by mouth every 6 hours as needed for pain      Facility-Administered Medications: None           Physical Exam   Vital Signs: Temp: 98.5  F (36.9  C)   BP: (!) 188/83 Pulse: 67   Resp: 15 SpO2: 96 %      Weight: 188 lbs 0 oz    General Appearance: In no acute distress  RESPIRATORY: Respirations nonlabored  CARDIOVASCULAR: S1, S2, without murmur, rub, or gallop. No le edema bilat.  ABDOMEN: slightly distended, TTP throughout  NEUROLOGIC: No focal arm or leg  weakness, speech is clear, alert    Medical Decision Making       >75 MINUTES SPENT BY ME on the date of service doing chart review, history, exam, documentation & further activities per the note.      Data

## 2024-04-11 NOTE — MEDICATION SCRIBE - ADMISSION MEDICATION HISTORY
Medication Scribe Admission Medication History    Admission medication history is complete. The information provided in this note is only as accurate as the sources available at the time of the update.    Information Source(s): Patient and Family member via in-person    Pertinent Information: patient reports management of medications with some assistance from daughter, Myles. Myles  works at [employer] as a NA on [facility]. She came down to assist with medication interview.     Patient reports no longer taking: Atorvastatin, Oxycodone, Zyrtec, Albuterol    Changes made to PTA medication list:  Added: Tylenol, Allopurinol, D3, Voltaren, Hydrochlorothiazide, Losartan, Rosuvastatin, trazodone   Deleted: Atorvastatin, Oxycodone,  Changed: Amlodipine from 5 to 10,     Allergies reviewed with patient and updates made in EHR: yes    Medication History Completed By: Manohar Cadet 4/11/2024 6:12 PM    PTA Med List   Medication Sig Last Dose    acetaminophen (TYLENOL) 650 MG CR tablet Take 650 mg by mouth every 8 hours as needed Past Week at prn    allopurinol (ZYLOPRIM) 300 MG tablet Take 300 mg by mouth 2 times daily 4/11/2024 at am    amLODIPine (NORVASC) 10 MG tablet Take 10 mg by mouth daily 4/11/2024 at am    aspirin 81 MG EC tablet Take 81 mg by mouth daily 4/11/2024 at am    CHOLECALCIFEROL 25 MCG (1000 UT) tablet Take 25 mcg by mouth daily 4/11/2024 at am    diclofenac (VOLTAREN) 1 % topical gel Apply 4 g topically 4 times daily as needed Past Month at prn    losartan (COZAAR) 100 MG tablet Take 100 mg by mouth daily 4/11/2024 at am    rosuvastatin (CRESTOR) 20 MG tablet Take 20 mg by mouth at bedtime 4/11/2024 at am    traZODone (DESYREL) 150 MG tablet Take 150 mg by mouth nightly as needed 4/10/2024 at prn

## 2024-04-11 NOTE — ED TRIAGE NOTES
Patient Hmong speaking using language line to help with communication. Patient states 2 week history of upper chest pain, some nausea. States he has had chest pain in past, denies MI. EKG being done. Weight increase of 20 pounds compared to prior weight in chart

## 2024-04-11 NOTE — CONSULTS
"Care Management Initial Consult    General Information  Assessment completed with: Patient, Spouse or significant other, Peter and wife Donovan  Type of CM/SW Visit: Initial Assessment    Primary Care Provider verified and updated as needed: Yes   Readmission within the last 30 days: no previous admission in last 30 days      Reason for Consult: discharge planning  Advance Care Planning: Advance Care Planning Reviewed: no concerns identified          Communication Assessment  Patient's communication style: spoken language (non-English) (speaks Hmong and some English)             Cognitive  Cognitive/Neuro/Behavioral: WDL                      Living Environment:   People in home: spouse, child(gregory), adult     Current living Arrangements: house      Able to return to prior arrangements: yes       Family/Social Support:  Care provided by: self, child(gregory), other (see comments) (son is his PCA)  Provides care for: no one, unable/limited ability to care for self  Marital Status:   Wife, PCA, Children  Yinelly       Description of Support System: Supportive, Involved    Support Assessment: Adequate family and caregiver support, Adequate social supports, Patient communicates needs well met    Current Resources:   Patient receiving home care services: No     Community Resources: PCA  Equipment currently used at home: cane, quad, walker, rolling  Supplies currently used at home: None    Employment/Financial:  Employment Status: retired     Employment/ Comments: \"no  history\"  Financial Concerns:     Referral to Financial Worker: No       Does the patient's insurance plan have a 3 day qualifying hospital stay waiver?  No    Functional Status:  Prior to admission patient needed assistance:   Dependent ADLs:: Ambulation-cane, Ambulation-walker, Bathing, Dressing  Dependent IADLs:: Cleaning, Cooking, Laundry, Shopping, Meal Preparation, Medication Management, Transportation  Assesssment of Functional Status: At " functional baseline    Mental Health Status:          Chemical Dependency Status:                Values/Beliefs:  Spiritual, Cultural Beliefs, Mormonism Practices, Values that affect care:                 Additional Information:  Sedrick lives in a house with his wife and kids. His son is his PCA for help with some ADLs and all IADLs. He uses a cane or walker for mobility.    He should be able to return home at discharge.     Family to transport at discharge.    CM to follow for medical progression of care, discharge recommendations, and final discharge plan.    Sulma Drake RN

## 2024-04-11 NOTE — ED PROVIDER NOTES
EMERGENCY DEPARTMENT ENCOUNTER      NAME: Sedrick Murray  AGE: 73 year old male  YOB: 1950  MRN: [id number]  EVALUATION DATE & TIME: No admission date for patient encounter.    PCP: Milvia Costa    ED PROVIDER: Janelle Bergeron M.D.      Chief Complaint   Patient presents with    Chest Pain       FINAL IMPRESSION:  1. NSTEMI (non-ST elevated myocardial infarction) (H)        ED COURSE & MEDICAL DECISION MAKING:    Chest pain, upper abdominal pain.  Differentials considered include ACS, PE, gastritis, pancreatitis, new onset CHF, esophageal spasm, dissection less likely given description of pain but still considered.   I ordered blood work including troponin, coags, lipase, bnp, cbc, hepatic panel, bmp.  EKG was reviewed by myself (done per triage protocol).   I ordered patient asa and pain medication.  I reviewed blood work which was significant for lipase of 183, troponin of 60, wbc of 12.4, glucose of 226, normal kidney function, bnp of 1801, platelets slightly low at 122k, appears chronic (platelets 135k in 2020).  Due to elevation of lipase as well as troponin elevation and chest pain and upper abdominal pain, I ordered ct PE study as well as CT A/P to further evaluate for possible causes of pain including pancreatitis, dissection, AAA.  I reviewed results from ct scans which showed no PE, no abnormalities to explain chest pain, did show ulceration of known proximal SMA plaque compared to previous study 2020. Also showed no pancreatitis but advanced atherosclerotic disease with 3.9x3.1cm infrarenal AAA which has slightly increased in size.   I spoke to Cardiology regarding plan for heparin and trending troponins. Dr. Trujillo agrees with [facility]'s echocardiogram per hospitalist.   I admitted patient to medicine.    12:12 PM I met with the patient to gather history and to perform my initial exam. I discussed the plan for care while in the Emergency Department.  3:17 PM I spoke with Cardiology,   Cassandra, who agrees with unfractionated heparin, [facility]'s echo.  3:20 PM Spoke with the Hospitalist, Dr. Linn, who accepts the patient for admission.        Pertinent Labs & Imaging studies reviewed. (See chart for details).    Medical Decision Making  Obtained supplemental history:Supplemental history obtained?: Family Member/Significant Other  Reviewed external records: External records reviewed?: Documented in chart  Care impacted by chronic illness:Cerebrovascular Disease, Chronic Pain, Diabetes, Heart Disease, Hyperlipidemia, Hypertension, and Mental Health  Care significantly affected by social determinants of health:N/A  Did you consider but not order tests?: Work up considered but not performed and documented in chart, if applicable  Did you interpret images independently?: Independent interpretation of ECG and images noted in documentation, when applicable.  Consultation discussion with other provider:Did you involve another provider (consultant, , pharmacy, etc.)?: I discussed the care with another health care provider, see documentation for details.  Admit.    At the conclusion of the encounter I discussed the results of all of the tests and the disposition. The questions were answered. The patient or family acknowledged understanding and was agreeable with the care plan.      CRITICAL CARE:  N/A    HPI    Patient information was obtained from: Patient and Patient's Wife    Use of : Yes (In Person and Phone) - Language Sahra      Sedrick Murray is a 73 year old male with a pertinent medical history of CVA, hemorrhagic stroke, basal ganglia hemorrhage, chronic pain, ASCVD, CAD, acute diastolic heart failure, aortic valve insuffiencey, HTN, HLD, T2DM, thrombocytopenic disorder, gout, anxiety, who presents to the ED for evaluation of chest pain, abdominal pain, and numbness.     Patient reports having constant generalized lower chest pain/\upper abdominal pain for approximately 2 weeks  "characterized as burning as well as \"fast and full.\" He endorses associated abdominal distention and shortness of breath. He states that his shortness of breath is worsened with laying flat on his back. He also mentions that recently he has been having progressively worsening bilateral leg swelling, as well as notes that recently he has been eating and urinating more than usual. He denies his chest pains/abdominal pains being worsened with eating. He denies any known personal or familial history of myocardial infarction. He denies any recent coughing. He additionally reports having facial numbness within the ED around his mouth with associated stiffness. He states that he feels like he is unable to talk normally secondary to his facial numbness. He denies any other complications at this time.     Patient's wife endorses the patient undergoing abdominal surgery in the past related to his kidney and/or bladder. She also mentions that recently the patient has been having generalized body swelling, not just leg swelling.     Per Chart Review: Patient was seen at [facility] on 05/09/23 for an angiography. The patient tolerated the procedure quite well. The right coronary artery was large and dominant with mild disease in the RCA itself. The posterolateral branch was medium in size and had a proximal lesion of 40%. The PDA is diffusely diseased with mild narrowing. There was nothing occlusive. The left main had mild disease of 20%. This gave rise to a medium size ramus intermediate branch with mild disease or high obtuse marginal branch. The circumflex had mild disease and was relatively small distally. The LAD gave rise to a small diagonal branch. The LAD was diffusely diseased. In the mid portion there was diffuse 50% narrowing and in the anteroapical LAD there was a 75% narrowing. The patient also had an echocardiogram performed that demonstrated preserved LV systolic function with moderate aortic valve " insufficiency. The patient clearly had significant distal LAD disease.  Patient was deemed to need titration of his antianginal medications and to be diuresed to some degree.     Per Chart Review: Patient had a US CAROTID DUPLEX BILATERAL performed on 05/12/23 which had the following impression:   1. Based on the ICA velocities, ICA/CCA ratio, and 2D images there is plaque causing <50% stenosis in the right internal carotid artery and there is plaque causing <50% stenosis in the left internal carotid artery.  2. Normal antegrade flow in the right vertebral artery and No identifiable flow in the left vertebral artery.  3. Multiphasic flow within bilateral subclavian arteries.  4. Right vertebral artery flow: Antegrade     No arterial flow could be detected in the region of the left vertebral artery.         REVIEW OF SYSTEMS  All other systems negative unless noted in HPI.    PAST MEDICAL HISTORY:  Past Medical History:   Diagnosis Date    Anxiety     Back pain     Cerebral vascular accident (H)     Chest pain     Chronic pain     Diarrhea     Dyslipidemia 9/1/2014    Gout     Hemorrhagic stroke (H)     Hypertension        PAST SURGICAL HISTORY:  Past Surgical History:   Procedure Laterality Date    CHOLECYSTECTOMY      IR MISCELLANEOUS PROCEDURE  3/9/2013    IR MISCELLANEOUS PROCEDURE  3/9/2013         CURRENT MEDICATIONS:    Current Facility-Administered Medications   Medication Dose Route Frequency Provider Last Rate Last Admin    acetaminophen (TYLENOL) tablet 650 mg  650 mg Oral Q4H PRN Krish iLnn DO        Or    acetaminophen (TYLENOL) Suppository 650 mg  650 mg Rectal Q4H PRN Krish Linn DO        calcium carbonate (TUMS) chewable tablet 1,000 mg  1,000 mg Oral 4x Daily PRN Krish Linn DO        heparin 25,000 units in 0.45% NaCl 250 mL ANTICOAGULANT infusion  0-5,000 Units/hr Intravenous Continuous Janelle Bergeron MD 10 mL/hr at 04/11/24 1638 1,000 Units/hr at 04/11/24 1638     hydrALAZINE (APRESOLINE) injection 10 mg  10 mg Intravenous Q4H PRN Krish Linn DO        melatonin tablet 3 mg  3 mg Oral At Bedtime PRN Krish Linn DO        morphine (PF) injection 2 mg  2 mg Intravenous Q3H PRN Krish Linn DO        nitroGLYcerin (NITROSTAT) sublingual tablet 0.4 mg  0.4 mg Sublingual Q5 Min PRN Krish Linn DO        ondansetron (ZOFRAN ODT) ODT tab 4 mg  4 mg Oral Q6H PRN Krish Linn DO        Or    ondansetron (ZOFRAN) injection 4 mg  4 mg Intravenous Q6H PRN Krish Linn DO        Patient is already receiving anticoagulation with heparin, enoxaparin (LOVENOX), warfarin (COUMADIN)  or other anticoagulant medication   Does not apply Continuous PRN Krish Linn DO        polyethylene glycol (MIRALAX) Packet 17 g  17 g Oral BID PRN Krish Linn DO        prochlorperazine (COMPAZINE) injection 5 mg  5 mg Intravenous Q6H PRN Krish Linn DO        Or    prochlorperazine (COMPAZINE) tablet 5 mg  5 mg Oral Q6H PRKrish Chaparro DO        Or    prochlorperazine (COMPAZINE) suppository 12.5 mg  12.5 mg Rectal Q12H PRKrish Chaparro DO        senna-docusate (SENOKOT-S/PERICOLACE) 8.6-50 MG per tablet 1 tablet  1 tablet Oral BID PRKrish Chaparro DO        Or    senna-docusate (SENOKOT-S/PERICOLACE) 8.6-50 MG per tablet 2 tablet  2 tablet Oral BID PRN Krish Linn DO         Current Outpatient Medications   Medication Sig Dispense Refill    amLODIPine (NORVASC) 10 MG tablet Take 10 mg by mouth daily      ASPIRIN LOW DOSE 81 MG chewable tablet Take 81 mg by mouth daily      CHOLECALCIFEROL 25 MCG (1000 UT) tablet Take 25 mcg by mouth daily      losartan (COZAAR) 100 MG tablet Take 100 mg by mouth daily      rosuvastatin (CRESTOR) 20 MG tablet Take 20 mg by mouth at bedtime      acetaminophen (TYLENOL) 650 MG CR tablet Take 650 mg by mouth every 8 hours as needed      allopurinol (ZYLOPRIM) 300 MG tablet Take 300  "mg by mouth 2 times daily      cetirizine (ZYRTEC) 10 MG tablet Take 10 mg by mouth daily      diclofenac (VOLTAREN) 1 % topical gel Apply 4 g topically 4 times daily as needed      hydrochlorothiazide (HYDRODIURIL) 25 MG tablet Take 50 mg by mouth daily      traZODone (DESYREL) 150 MG tablet Take 150 mg by mouth 2 times daily           ALLERGIES:  Allergies   Allergen Reactions    Dilaudid [Hydromorphone] Unknown    Phenytoin Hives and Itching       FAMILY HISTORY:  Family History   Problem Relation Age of Onset    Cerebrovascular Disease Mother        SOCIAL HISTORY:  Social History     Socioeconomic History    Marital status:    Tobacco Use    Smoking status: Never   Substance and Sexual Activity    Alcohol use: Yes    Drug use: No   Social History Narrative    Patient presented to the ED with his wife 04/25/17       Social Determinants of Health      Received from [facility], [facility]    Social Connections       VITALS:  Patient Vitals for the past 24 hrs:   BP Temp Pulse Resp SpO2 Height Weight   04/11/24 1430 (!) 188/83 -- 67 15 96 % -- --   04/11/24 1345 (!) 183/81 -- 69 21 95 % -- --   04/11/24 1330 (!) 165/74 -- 65 16 92 % -- --   04/11/24 1320 (!) 183/79 -- 66 16 93 % -- --   04/11/24 1203 (!) 190/84 98.5  F (36.9  C) 70 20 95 % 1.6 m (5' 3\") 85.3 kg (188 lb)       PHYSICAL EXAM    VITAL SIGNS: BP (!) 188/83   Pulse 67   Temp 98.5  F (36.9  C)   Resp 15   Ht 1.6 m (5' 3\")   Wt 85.3 kg (188 lb)   SpO2 96%   BMI 33.30 kg/m    Physical Exam  Vitals and nursing note reviewed.   Constitutional:       Appearance: He is not diaphoretic.      Comments: Appears uncomfortable.   HENT:      Head: Normocephalic and atraumatic.      Nose: No congestion.      Mouth/Throat:      Comments: 2-3mm erythematous papules on hard palate noted. Uvula midline. No exudate seen.  Eyes:      General:         Right eye: No discharge.         Left eye: " No discharge.      Pupils: Pupils are equal, round, and reactive to light.   Cardiovascular:      Rate and Rhythm: Normal rate and regular rhythm.      Pulses: Normal pulses.   Pulmonary:      Effort: Pulmonary effort is normal. No respiratory distress.   Abdominal:      Palpations: Abdomen is soft.      Tenderness: There is no guarding or rebound.      Comments: Mild pain to palpation of upper abdomen bilaterally.    Musculoskeletal:         General: No signs of injury.      Cervical back: Normal range of motion. No rigidity.      Comments: No significant lower extremity edema   Skin:     General: Skin is warm and dry.      Comments: Hyperpigmentation of skin of bilateral lower extremities   Neurological:      Mental Status: He is alert. Mental status is at baseline.      Cranial Nerves: No cranial nerve deficit.         LABS  Labs Ordered and Resulted from Time of ED Arrival to Time of ED Departure   BASIC METABOLIC PANEL - Abnormal       Result Value    Sodium 138      Potassium 4.1      Chloride 101      Carbon Dioxide (CO2) 26      Anion Gap 11      Urea Nitrogen 30.2 (*)     Creatinine 1.04      GFR Estimate 76      Calcium 8.5 (*)     Glucose 226 (*)    TROPONIN T, HIGH SENSITIVITY - Abnormal    Troponin T, High Sensitivity 60 (*)    CBC WITH PLATELETS AND DIFFERENTIAL - Abnormal    WBC Count 12.4 (*)     RBC Count 5.25      Hemoglobin 16.1      Hematocrit 49.2      MCV 94      MCH 30.7      MCHC 32.7      RDW 13.2      Platelet Count 122 (*)     % Neutrophils 84      % Lymphocytes 7      % Monocytes 6      % Eosinophils 1      % Basophils 0      % Immature Granulocytes 2      NRBCs per 100 WBC 0      Absolute Neutrophils 10.5 (*)     Absolute Lymphocytes 0.8      Absolute Monocytes 0.8      Absolute Eosinophils 0.1      Absolute Basophils 0.0      Absolute Immature Granulocytes 0.2      Absolute NRBCs 0.0     LIPASE - Abnormal    Lipase 183 (*)    NT PROBNP INPATIENT - Abnormal    N terminal Pro BNP  Inpatient 1,801 (*)    TROPONIN T, HIGH SENSITIVITY - Abnormal    Troponin T, High Sensitivity 49 (*)    LIPID PROFILE - Abnormal    Cholesterol 210 (*)     Triglycerides 343 (*)     Direct Measure HDL 80      LDL Cholesterol Calculated 61      Non HDL Cholesterol 130 (*)    TROPONIN T, HIGH SENSITIVITY - Abnormal    Troponin T, High Sensitivity 47 (*)    INR - Normal    INR 0.99     PARTIAL THROMBOPLASTIN TIME - Normal    aPTT 27     LACTIC ACID WHOLE BLOOD - Normal    Lactic Acid 1.2     HEPARIN UNFRACTIONATED ANTI XA LEVEL         RADIOLOGY  Echocardiogram Complete   Final Result      CT Abdomen Pelvis w Contrast   Final Result   IMPRESSION:    1.  The pancreas is unremarkable. No findings for acute pancreatitis.   2.  Mild fatty infiltration of the liver.   3.  Cholecystectomy.   4.  Advanced atherosclerotic disease with 3.9 x 3.1 cm infrarenal abdominal aortic aneurysm which has slightly increased in size.   5.  Small cysts both kidneys.   6.  Mild prostatic hypertrophy.      CT Chest Pulmonary Embolism w Contrast   Final Result   IMPRESSION:   1.  No abnormalities are seen to explain chest pain.   2.  Specifically, no evidence of pulmonary emboli, aortic aneurysm or dissection.   3.  Patient has developed an ulceration of the proximal SMA plaque which can be seen on the study done in 2020. The SMA proximally is ectatic and measures 1.2 cm, unchanged.   4.  Incidental 2 right renal arteries.         I have independently reviewed the above image. See radiology report for detail.      EKG:    EKG obtained at 1207 and shows sinus rhythm rate 68, Qtc 427, compared to previous EKG on 5/5/20, T wave inversion in V5- V6, previous EKG demonstrates biphasic T in V6 . I have independently reviewed and interpreted today's EKG, pending Cardiologist read.       PROCEDURES:  N/A      MEDICATIONS GIVEN IN THE EMERGENCY:  Medications   heparin 25,000 units in 0.45% NaCl 250 mL ANTICOAGULANT infusion (1,000 Units/hr Intravenous  $New Bag 4/11/24 1638)   acetaminophen (TYLENOL) tablet 650 mg (has no administration in time range)     Or   acetaminophen (TYLENOL) Suppository 650 mg (has no administration in time range)   melatonin tablet 3 mg (has no administration in time range)   senna-docusate (SENOKOT-S/PERICOLACE) 8.6-50 MG per tablet 1 tablet (has no administration in time range)     Or   senna-docusate (SENOKOT-S/PERICOLACE) 8.6-50 MG per tablet 2 tablet (has no administration in time range)   polyethylene glycol (MIRALAX) Packet 17 g (has no administration in time range)   ondansetron (ZOFRAN ODT) ODT tab 4 mg (has no administration in time range)     Or   ondansetron (ZOFRAN) injection 4 mg (has no administration in time range)   prochlorperazine (COMPAZINE) injection 5 mg (has no administration in time range)     Or   prochlorperazine (COMPAZINE) tablet 5 mg (has no administration in time range)     Or   prochlorperazine (COMPAZINE) suppository 12.5 mg (has no administration in time range)   calcium carbonate (TUMS) chewable tablet 1,000 mg (has no administration in time range)   hydrALAZINE (APRESOLINE) injection 10 mg (has no administration in time range)   Patient is already receiving anticoagulation with heparin, enoxaparin (LOVENOX), warfarin (COUMADIN)  or other anticoagulant medication (has no administration in time range)   nitroGLYcerin (NITROSTAT) sublingual tablet 0.4 mg (has no administration in time range)   morphine (PF) injection 2 mg (has no administration in time range)   aspirin (ASA) chewable tablet 324 mg (324 mg Oral $Given 4/11/24 1311)   fentaNYL (PF) (SUBLIMAZE) injection 50 mcg (50 mcg Intravenous $Given 4/11/24 1341)   iopamidol (ISOVUE-370) solution 90 mL (90 mLs Intravenous $Given 4/11/24 1405)   heparin loading dose for LOW INTENSITY TREATMENT * Give BEFORE starting heparin infusion (5,100 Units Intravenous $Given 4/11/24 1637)       NEW PRESCRIPTIONS STARTED AT TODAY'S ER VISIT  New Prescriptions    No  medications on file        I, Rudolph Maximino, am serving as a scribe to document services personally performed by Janelle Bergeron MD, based on my observations and the provider's statements to me.  I, Janelle Bergeron MD, attest that Rudolph Stanton is acting in a scribe capacity, has observed my performance of the services and has documented them in accordance with my direction.     Janelle Bergeron MD  Emergency Medicine  [facility] EMERGENCY DEPARTMENT  [address] [place] [id number] [phone]  Dept: [phone]             Janelle Bergeron MD  04/12/24 1300

## 2024-04-12 ENCOUNTER — OFFICE VISIT (OUTPATIENT)
Dept: INTERPRETER SERVICES | Facility: CLINIC | Age: 74
End: 2024-04-12

## 2024-04-12 ENCOUNTER — VIRTUAL VISIT (OUTPATIENT)
Dept: INTERPRETER SERVICES | Facility: CLINIC | Age: 74
End: 2024-04-12
Payer: COMMERCIAL

## 2024-04-12 LAB
ABO/RH(D): NORMAL
ANION GAP SERPL CALCULATED.3IONS-SCNC: 9 MMOL/L (ref 7–15)
ANTIBODY SCREEN: NEGATIVE
ATRIAL RATE - MUSE: 68 BPM
BUN SERPL-MCNC: 26.3 MG/DL (ref 8–23)
CALCIUM SERPL-MCNC: 9 MG/DL (ref 8.8–10.2)
CHLORIDE SERPL-SCNC: 101 MMOL/L (ref 98–107)
CREAT SERPL-MCNC: 0.78 MG/DL (ref 0.67–1.17)
DEPRECATED HCO3 PLAS-SCNC: 30 MMOL/L (ref 22–29)
DIASTOLIC BLOOD PRESSURE - MUSE: NORMAL MMHG
EGFRCR SERPLBLD CKD-EPI 2021: >90 ML/MIN/1.73M2
ERYTHROCYTE [DISTWIDTH] IN BLOOD BY AUTOMATED COUNT: 13.5 % (ref 10–15)
GLUCOSE SERPL-MCNC: 118 MG/DL (ref 70–99)
HCT VFR BLD AUTO: 50.1 % (ref 40–53)
HGB BLD-MCNC: 16 G/DL (ref 13.3–17.7)
INTERPRETATION ECG - MUSE: NORMAL
MCH RBC QN AUTO: 30.1 PG (ref 26.5–33)
MCHC RBC AUTO-ENTMCNC: 31.9 G/DL (ref 31.5–36.5)
MCV RBC AUTO: 94 FL (ref 78–100)
P AXIS - MUSE: -2 DEGREES
PLATELET # BLD AUTO: 122 10E3/UL (ref 150–450)
POTASSIUM SERPL-SCNC: 3.6 MMOL/L (ref 3.4–5.3)
PR INTERVAL - MUSE: 154 MS
QRS DURATION - MUSE: 90 MS
QT - MUSE: 420 MS
QTC - MUSE: 446 MS
R AXIS - MUSE: -12 DEGREES
RBC # BLD AUTO: 5.31 10E6/UL (ref 4.4–5.9)
SODIUM SERPL-SCNC: 140 MMOL/L (ref 135–145)
SPECIMEN EXPIRATION DATE: NORMAL
SYSTOLIC BLOOD PRESSURE - MUSE: NORMAL MMHG
T AXIS - MUSE: 86 DEGREES
UFH PPP CHRO-ACNC: 0.24 IU/ML
VENTRICULAR RATE- MUSE: 68 BPM
WBC # BLD AUTO: 9.1 10E3/UL (ref 4–11)

## 2024-04-12 PROCEDURE — 99223 1ST HOSP IP/OBS HIGH 75: CPT | Performed by: GENERAL ACUTE CARE HOSPITAL

## 2024-04-12 PROCEDURE — 36415 COLL VENOUS BLD VENIPUNCTURE: CPT | Performed by: INTERNAL MEDICINE

## 2024-04-12 PROCEDURE — C1894 INTRO/SHEATH, NON-LASER: HCPCS | Performed by: INTERNAL MEDICINE

## 2024-04-12 PROCEDURE — 250N000013 HC RX MED GY IP 250 OP 250 PS 637: Performed by: INTERNAL MEDICINE

## 2024-04-12 PROCEDURE — 99152 MOD SED SAME PHYS/QHP 5/>YRS: CPT | Performed by: INTERNAL MEDICINE

## 2024-04-12 PROCEDURE — 250N000013 HC RX MED GY IP 250 OP 250 PS 637

## 2024-04-12 PROCEDURE — 255N000002 HC RX 255 OP 636: Performed by: INTERNAL MEDICINE

## 2024-04-12 PROCEDURE — 85027 COMPLETE CBC AUTOMATED: CPT | Performed by: INTERNAL MEDICINE

## 2024-04-12 PROCEDURE — C1887 CATHETER, GUIDING: HCPCS | Performed by: INTERNAL MEDICINE

## 2024-04-12 PROCEDURE — T1013 SIGN LANG/ORAL INTERPRETER: HCPCS | Mod: GT,TEL | Performed by: INTERPRETER

## 2024-04-12 PROCEDURE — 250N000013 HC RX MED GY IP 250 OP 250 PS 637: Performed by: GENERAL ACUTE CARE HOSPITAL

## 2024-04-12 PROCEDURE — 210N000001 HC R&B IMCU HEART CARE

## 2024-04-12 PROCEDURE — 93010 ELECTROCARDIOGRAM REPORT: CPT | Performed by: INTERNAL MEDICINE

## 2024-04-12 PROCEDURE — 93458 L HRT ARTERY/VENTRICLE ANGIO: CPT | Performed by: INTERNAL MEDICINE

## 2024-04-12 PROCEDURE — 250N000011 HC RX IP 250 OP 636: Performed by: INTERNAL MEDICINE

## 2024-04-12 PROCEDURE — 93005 ELECTROCARDIOGRAM TRACING: CPT

## 2024-04-12 PROCEDURE — 250N000009 HC RX 250: Performed by: INTERNAL MEDICINE

## 2024-04-12 PROCEDURE — 258N000003 HC RX IP 258 OP 636: Performed by: GENERAL ACUTE CARE HOSPITAL

## 2024-04-12 PROCEDURE — 250N000011 HC RX IP 250 OP 636: Performed by: GENERAL ACUTE CARE HOSPITAL

## 2024-04-12 PROCEDURE — 4A023N7 MEASUREMENT OF CARDIAC SAMPLING AND PRESSURE, LEFT HEART, PERCUTANEOUS APPROACH: ICD-10-PCS | Performed by: INTERNAL MEDICINE

## 2024-04-12 PROCEDURE — 85520 HEPARIN ASSAY: CPT | Performed by: INTERNAL MEDICINE

## 2024-04-12 PROCEDURE — B211YZZ FLUOROSCOPY OF MULTIPLE CORONARY ARTERIES USING OTHER CONTRAST: ICD-10-PCS | Performed by: INTERNAL MEDICINE

## 2024-04-12 PROCEDURE — 86900 BLOOD TYPING SEROLOGIC ABO: CPT | Performed by: GENERAL ACUTE CARE HOSPITAL

## 2024-04-12 PROCEDURE — 99233 SBSQ HOSP IP/OBS HIGH 50: CPT | Performed by: INTERNAL MEDICINE

## 2024-04-12 PROCEDURE — 93458 L HRT ARTERY/VENTRICLE ANGIO: CPT | Mod: 26 | Performed by: INTERNAL MEDICINE

## 2024-04-12 PROCEDURE — 80048 BASIC METABOLIC PNL TOTAL CA: CPT | Performed by: INTERNAL MEDICINE

## 2024-04-12 PROCEDURE — T1013 SIGN LANG/ORAL INTERPRETER: HCPCS | Mod: U3 | Performed by: INTERPRETER

## 2024-04-12 PROCEDURE — 272N000001 HC OR GENERAL SUPPLY STERILE: Performed by: INTERNAL MEDICINE

## 2024-04-12 RX ORDER — FENTANYL CITRATE 50 UG/ML
25 INJECTION, SOLUTION INTRAMUSCULAR; INTRAVENOUS
Status: DISCONTINUED | OUTPATIENT
Start: 2024-04-12 | End: 2024-04-12 | Stop reason: HOSPADM

## 2024-04-12 RX ORDER — ASPIRIN 325 MG
325 TABLET ORAL ONCE
Status: COMPLETED | OUTPATIENT
Start: 2024-04-12 | End: 2024-04-12

## 2024-04-12 RX ORDER — POTASSIUM CHLORIDE 1500 MG/1
40 TABLET, EXTENDED RELEASE ORAL ONCE
Qty: 2 TABLET | Refills: 0 | Status: COMPLETED | OUTPATIENT
Start: 2024-04-12 | End: 2024-04-12

## 2024-04-12 RX ORDER — NALOXONE HYDROCHLORIDE 0.4 MG/ML
0.4 INJECTION, SOLUTION INTRAMUSCULAR; INTRAVENOUS; SUBCUTANEOUS
Status: DISCONTINUED | OUTPATIENT
Start: 2024-04-12 | End: 2024-04-15 | Stop reason: HOSPADM

## 2024-04-12 RX ORDER — ACETAMINOPHEN 325 MG/1
650 TABLET ORAL EVERY 4 HOURS PRN
Status: DISCONTINUED | OUTPATIENT
Start: 2024-04-12 | End: 2024-04-15

## 2024-04-12 RX ORDER — NALOXONE HYDROCHLORIDE 0.4 MG/ML
0.4 INJECTION, SOLUTION INTRAMUSCULAR; INTRAVENOUS; SUBCUTANEOUS
Status: ACTIVE | OUTPATIENT
Start: 2024-04-12 | End: 2024-04-12

## 2024-04-12 RX ORDER — SODIUM CHLORIDE 9 MG/ML
INJECTION, SOLUTION INTRAVENOUS CONTINUOUS
Status: DISCONTINUED | OUTPATIENT
Start: 2024-04-12 | End: 2024-04-12 | Stop reason: HOSPADM

## 2024-04-12 RX ORDER — ROSUVASTATIN CALCIUM 10 MG/1
20 TABLET, COATED ORAL AT BEDTIME
Status: DISCONTINUED | OUTPATIENT
Start: 2024-04-12 | End: 2024-04-15 | Stop reason: HOSPADM

## 2024-04-12 RX ORDER — ASPIRIN 81 MG/1
81 TABLET ORAL DAILY
Status: DISCONTINUED | OUTPATIENT
Start: 2024-04-12 | End: 2024-04-15 | Stop reason: HOSPADM

## 2024-04-12 RX ORDER — DIAZEPAM 5 MG
5 TABLET ORAL ONCE
Status: DISCONTINUED | OUTPATIENT
Start: 2024-04-12 | End: 2024-04-12

## 2024-04-12 RX ORDER — FUROSEMIDE 10 MG/ML
40 INJECTION INTRAMUSCULAR; INTRAVENOUS EVERY 8 HOURS
Status: DISCONTINUED | OUTPATIENT
Start: 2024-04-12 | End: 2024-04-14

## 2024-04-12 RX ORDER — FENTANYL CITRATE 50 UG/ML
INJECTION, SOLUTION INTRAMUSCULAR; INTRAVENOUS
Status: DISCONTINUED | OUTPATIENT
Start: 2024-04-12 | End: 2024-04-12 | Stop reason: HOSPADM

## 2024-04-12 RX ORDER — NALOXONE HYDROCHLORIDE 0.4 MG/ML
0.2 INJECTION, SOLUTION INTRAMUSCULAR; INTRAVENOUS; SUBCUTANEOUS
Status: DISCONTINUED | OUTPATIENT
Start: 2024-04-12 | End: 2024-04-15 | Stop reason: HOSPADM

## 2024-04-12 RX ORDER — HYDRALAZINE HYDROCHLORIDE 20 MG/ML
10 INJECTION INTRAMUSCULAR; INTRAVENOUS
Status: DISCONTINUED | OUTPATIENT
Start: 2024-04-12 | End: 2024-04-15

## 2024-04-12 RX ORDER — LOSARTAN POTASSIUM 50 MG/1
100 TABLET ORAL DAILY
Status: DISCONTINUED | OUTPATIENT
Start: 2024-04-12 | End: 2024-04-15 | Stop reason: HOSPADM

## 2024-04-12 RX ORDER — TRAZODONE HYDROCHLORIDE 50 MG/1
150 TABLET, FILM COATED ORAL
Status: DISCONTINUED | OUTPATIENT
Start: 2024-04-12 | End: 2024-04-15 | Stop reason: HOSPADM

## 2024-04-12 RX ORDER — FENTANYL CITRATE 50 UG/ML
25 INJECTION, SOLUTION INTRAMUSCULAR; INTRAVENOUS
Status: DISCONTINUED | OUTPATIENT
Start: 2024-04-12 | End: 2024-04-12

## 2024-04-12 RX ORDER — OXYCODONE HYDROCHLORIDE 5 MG/1
10 TABLET ORAL EVERY 4 HOURS PRN
Status: DISCONTINUED | OUTPATIENT
Start: 2024-04-12 | End: 2024-04-15 | Stop reason: HOSPADM

## 2024-04-12 RX ORDER — DIAZEPAM 5 MG
5 TABLET ORAL
Status: COMPLETED | OUTPATIENT
Start: 2024-04-12 | End: 2024-04-12

## 2024-04-12 RX ORDER — ASPIRIN 81 MG/1
243 TABLET, CHEWABLE ORAL ONCE
Status: COMPLETED | OUTPATIENT
Start: 2024-04-12 | End: 2024-04-12

## 2024-04-12 RX ORDER — AMLODIPINE BESYLATE 5 MG/1
10 TABLET ORAL DAILY
Status: DISCONTINUED | OUTPATIENT
Start: 2024-04-12 | End: 2024-04-15

## 2024-04-12 RX ORDER — FLUMAZENIL 0.1 MG/ML
0.2 INJECTION, SOLUTION INTRAVENOUS
Status: ACTIVE | OUTPATIENT
Start: 2024-04-12 | End: 2024-04-12

## 2024-04-12 RX ORDER — NALOXONE HYDROCHLORIDE 0.4 MG/ML
0.2 INJECTION, SOLUTION INTRAMUSCULAR; INTRAVENOUS; SUBCUTANEOUS
Status: ACTIVE | OUTPATIENT
Start: 2024-04-12 | End: 2024-04-12

## 2024-04-12 RX ORDER — LIDOCAINE 40 MG/G
CREAM TOPICAL
Status: DISCONTINUED | OUTPATIENT
Start: 2024-04-12 | End: 2024-04-12 | Stop reason: HOSPADM

## 2024-04-12 RX ORDER — HYDROCHLOROTHIAZIDE 25 MG/1
50 TABLET ORAL DAILY
Status: DISCONTINUED | OUTPATIENT
Start: 2024-04-12 | End: 2024-04-13

## 2024-04-12 RX ORDER — ALLOPURINOL 300 MG/1
300 TABLET ORAL 2 TIMES DAILY
Status: DISCONTINUED | OUTPATIENT
Start: 2024-04-12 | End: 2024-04-15 | Stop reason: HOSPADM

## 2024-04-12 RX ORDER — ATROPINE SULFATE 0.1 MG/ML
0.5 INJECTION INTRAVENOUS
Status: ACTIVE | OUTPATIENT
Start: 2024-04-12 | End: 2024-04-12

## 2024-04-12 RX ORDER — SPIRONOLACTONE 25 MG/1
25 TABLET ORAL DAILY
Status: DISCONTINUED | OUTPATIENT
Start: 2024-04-12 | End: 2024-04-15 | Stop reason: HOSPADM

## 2024-04-12 RX ORDER — LIDOCAINE 4 G/G
1 PATCH TOPICAL
Status: DISCONTINUED | OUTPATIENT
Start: 2024-04-12 | End: 2024-04-15 | Stop reason: HOSPADM

## 2024-04-12 RX ORDER — OXYCODONE HYDROCHLORIDE 5 MG/1
5 TABLET ORAL EVERY 4 HOURS PRN
Status: DISCONTINUED | OUTPATIENT
Start: 2024-04-12 | End: 2024-04-15 | Stop reason: HOSPADM

## 2024-04-12 RX ORDER — SODIUM CHLORIDE 9 MG/ML
75 INJECTION, SOLUTION INTRAVENOUS CONTINUOUS
Status: DISCONTINUED | OUTPATIENT
Start: 2024-04-12 | End: 2024-04-12

## 2024-04-12 RX ADMIN — LOSARTAN POTASSIUM 100 MG: 50 TABLET, FILM COATED ORAL at 08:33

## 2024-04-12 RX ADMIN — HYDRALAZINE HYDROCHLORIDE 10 MG: 20 INJECTION INTRAMUSCULAR; INTRAVENOUS at 04:34

## 2024-04-12 RX ADMIN — ROSUVASTATIN CALCIUM 20 MG: 10 TABLET, FILM COATED ORAL at 21:27

## 2024-04-12 RX ADMIN — SPIRONOLACTONE 25 MG: 25 TABLET, FILM COATED ORAL at 11:12

## 2024-04-12 RX ADMIN — DIAZEPAM 5 MG: 5 TABLET ORAL at 13:17

## 2024-04-12 RX ADMIN — LIDOCAINE 1 PATCH: 4 PATCH TOPICAL at 21:41

## 2024-04-12 RX ADMIN — NITROGLYCERIN 0.4 MG: 0.4 TABLET, ORALLY DISINTEGRATING SUBLINGUAL at 21:27

## 2024-04-12 RX ADMIN — SODIUM CHLORIDE: 9 INJECTION, SOLUTION INTRAVENOUS at 11:12

## 2024-04-12 RX ADMIN — ACETAMINOPHEN 650 MG: 325 TABLET ORAL at 19:21

## 2024-04-12 RX ADMIN — FUROSEMIDE 40 MG: 10 INJECTION, SOLUTION INTRAMUSCULAR; INTRAVENOUS at 08:34

## 2024-04-12 RX ADMIN — ALLOPURINOL 300 MG: 300 TABLET ORAL at 21:27

## 2024-04-12 RX ADMIN — FUROSEMIDE 40 MG: 10 INJECTION, SOLUTION INTRAMUSCULAR; INTRAVENOUS at 16:49

## 2024-04-12 RX ADMIN — ASPIRIN 81 MG CHEWABLE TABLET 243 MG: 81 TABLET CHEWABLE at 11:12

## 2024-04-12 RX ADMIN — POTASSIUM CHLORIDE 40 MEQ: 1500 TABLET, EXTENDED RELEASE ORAL at 08:34

## 2024-04-12 RX ADMIN — HYDROCHLOROTHIAZIDE 50 MG: 25 TABLET ORAL at 08:34

## 2024-04-12 RX ADMIN — TRAZODONE HYDROCHLORIDE 150 MG: 50 TABLET ORAL at 21:41

## 2024-04-12 RX ADMIN — ACETAMINOPHEN 650 MG: 325 TABLET ORAL at 07:01

## 2024-04-12 RX ADMIN — ASPIRIN 81 MG: 81 TABLET, COATED ORAL at 08:34

## 2024-04-12 RX ADMIN — ALLOPURINOL 300 MG: 300 TABLET ORAL at 08:33

## 2024-04-12 RX ADMIN — AMLODIPINE BESYLATE 10 MG: 5 TABLET ORAL at 08:34

## 2024-04-12 ASSESSMENT — ACTIVITIES OF DAILY LIVING (ADL)
ADLS_ACUITY_SCORE: 30
ADLS_ACUITY_SCORE: 42
ADLS_ACUITY_SCORE: 41
ADLS_ACUITY_SCORE: 40
ADLS_ACUITY_SCORE: 41
ADLS_ACUITY_SCORE: 40
ADLS_ACUITY_SCORE: 40
ADLS_ACUITY_SCORE: 30
ADLS_ACUITY_SCORE: 42
ADLS_ACUITY_SCORE: 40
ADLS_ACUITY_SCORE: 42
ADLS_ACUITY_SCORE: 42
ADLS_ACUITY_SCORE: 40
ADLS_ACUITY_SCORE: 41
ADLS_ACUITY_SCORE: 30
ADLS_ACUITY_SCORE: 40
ADLS_ACUITY_SCORE: 42
ADLS_ACUITY_SCORE: 41
ADLS_ACUITY_SCORE: 40
ADLS_ACUITY_SCORE: 41
ADLS_ACUITY_SCORE: 42

## 2024-04-12 ASSESSMENT — EJECTION FRACTION: EF_VALUE: .32

## 2024-04-12 NOTE — PLAN OF CARE
"Goal Outcome Evaluation:      Problem: Adult Inpatient Plan of Care  Goal: Patient-Specific Goal (Individualized)  Description: You can add care plan individualizations to a care plan. Examples of Individualization might be:  \"Parent requests to be called daily at 9am for status\", \"I have a hard time hearing out of my right ear\", or \"Do not touch me to wake me up as it startles  me\".  Outcome: Progressing     Problem: Adult Inpatient Plan of Care  Goal: Optimal Comfort and Wellbeing  Outcome: Progressing     Problem: Risk for Delirium  Goal: Improved Attention and Thought Clarity  Outcome: Progressing  A/O x4. VSS. Denied any chest pain. PRN Tylenol given for headache 6/10. Calm and relaxed.                            "

## 2024-04-12 NOTE — PROGRESS NOTES
"Care Management Follow Up    Length of Stay (days): 1    Expected Discharge Date: 04/13/2024     Concerns to be Addressed: discharge planning     Patient plan of care discussed at interdisciplinary rounds: Yes    Anticipated Discharge Disposition:  TBD     Anticipated Discharge Services:  TBD  Anticipated Discharge DME:  JOSIAH    Patient/family educated on Medicare website which has current facility and service quality ratings:  NA  Education Provided on the Discharge Plan:  Yes per team  Patient/Family in Agreement with the Plan:  NA    Referrals Placed by CM/SW:  NA  Private pay costs discussed: Not applicable    Additional Information:  1453 rec'd a voicemail from Carol Ann BORDEN [facility] Care Coordinator [phone] requesting and update and discharge plans. Patient has PCA services 10.5 hrs/day.    Returned call and update given    Social Hx: \"Live w/family in a house. Son is PCA 10.5hrs/day for help with some ADLs and all IADLs. Carol Ann BORDEN [facility] care coordinator [phone]. Family to transport.\"    RNCM to follow for medical progression, recommendations, and final discharge plan.     Mary Penn RN     "

## 2024-04-12 NOTE — PROGRESS NOTES
Cath results reviewed. No change in CAD. Elevated LVEDP indicative of CHF. Continue IV diuresis, possible transition to oral diuretics and discharge home tomorrow.    Carlos Trujillo MD [facility]  Non-Invasive Cardiologist  [facility]  Pager [phone]

## 2024-04-12 NOTE — CONSULTS
We have been asked to see Sedrick Murray at [facility] by Dr. Krish Linn for evaluation of elevated troponin.    Impression and Plan     Assessment:  Non-ST elevation myocardial infarction. Possibly demand-mediated (type II) from uncontrolled hypertension and decompensated congestive heart failure but he has known coronary artery disease. Currently pain-free.  Acute on chronic congestive heart failure with preserved left ventricular ejection fraction. Uncontrolled hypertension is probably the main  of this. His brain natriuretic peptide is elevated and he described symptoms of congestive heart failure although he does not appear obviously hypervolemic on physical examination.  Coronary artery disease with severe stenosis of the distal left anterior descending artery and otherwise nonobstructive disease seen on coronary angiography 5/9/2023.  History of hemorrhagic stroke 3/15/2017 due to spontaneous right basal ganglia bleed. This was thought to be due to uncontrolled hypertension.  Moderate left internal carotid artery stenosis last assessed on carotid ultrasound 5/27/2020.  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 This is felt to be a chronic finding and he has no signs or symptoms of mesenteric ischemia.  Infrarenal abdominal aortic aneurysm measuring 3.9 cm noted on CT 4/11/2024.  Syncopal episode 5/5/2020 felt to be due to vasovagal syncope with evidence of this on follow-up ambulatory cardiac monitoring 5/28/2022.  Essential hypertension. Uncontrolled.  Hyperlipidemia. Last LDL 61 mg/dL.  Possible obstructive sleep apnea.  Obesity with body mass index 38.27 kg/m .    Plan:  Proceed with coronary angiography. Explained the risks and benefits to the patient. He demonstrates understanding and wishes to proceed.  Transthoracic echocardiogram  Diuresis with intravenous furosemide for now but we can reassess the need for this based on his left  "ventricular end diastolic pressure during cardiac catheterization  Add spironolactone 25 mg daily  Continue hydrochlorothiazide 50 mg daily (although this may need to be discontinued if we feel he needs to be on chronic oral loop diuretics), losartan 100 mg daily and amlodipine 10 mg daily  We could consider adding carvedilol although caution is needed with his prior history of vagally-mediated bradyarrhythmias  Rosuvastatin 20 mg daily  He should have an outpatient sleep medicine consultation    Primary Cardiologist: sees Dr. Manohar Marie with [facility]    Clinically Significant Risk Factors Present on Admission     # Drug Induced Platelet Defect: home medication list includes an antiplatelet medication   # Hypertension: Noted on problem list      # Obesity: Estimated body mass index is 38.27 kg/m  as calculated from the following:    Height as of this encounter: 1.6 m (5' 3\").    Weight as of this encounter: 98 kg (216 lb 0.8 oz).             History of Present Illness      Mr. Sedrick Murray is a 74 year old male with a history of CAD managed medically, HFpEF, HTN and prior hemorrhagic CVA in 2017 admitted yesterday with several days of shortness of breath and substernal chest pressure. His symptoms feel worse when he walks or when he lies down. He has not been able to sleep well due to his symptoms. He feels lightheaded but no presyncope or syncope. No lower extremity edema. He takes his blood pressure medication regularly but his blood pressure has been 190s systolic at home.    Blood pressure has been consistently elevated since admission. ECG showed SR with LVH. Hs-troponin was elevated 60 -> 49 ->47. NT-proBNP was elevated at 1801. CTA chest did not see any dissection, PE or pulmonary edema but incidentally noted ulceration of SMA plaque. The patient denies any decrease in appetite or pain with eating.    He was complaining of similar symptoms last year and was seen " "by a cardiologist through [facility]. He had a coronary angiogram done 5/9/2023 which showed severe CAD only in the distal LAD and elevated LVEDP of 21 mmHg. Medical management was recommended.    He feels tired a lot and says he thinks he snores.     Review of Systems:  Further review of systems is otherwise negative/noncontributory (based on review of medical record (admission H&P) and 13 point review of systems reviewed. Pertinent positives noted).    Cardiac Diagnostics     ECG 4/11/2024 (personally reviewed and interpreted): SR, LVH with repolarization    Telemetry (personally reviewed): SR, no arrhythmias    Cardiac event monitor 5/8/2020 (report reviewed):  Results:    Indication for study: Syncope and collapse    The monitor was scheduled for 30 days and worn for 22 days.  There was 20 days 5 hours of interpretable data.  Compliance with the monitor was fair.    The baseline rhythm transmission demonstrated normal sinus rhythm with heart rates in the 80s.  The TX interval, QRS duration and QT intervals all appear to be normal.    The patient had 2 manually activated rhythm recordings.  Symptoms were reported as \"other\".  The patient's rhythm demonstrated sinus rhythm with heart rates in the 70s.  There was no pathologic rhythm disturbance demonstrated.      The patient had 25 auto triggered recordings.  Symptoms were not reported.  The patient's rhythm demonstrated for the most part the patient's rhythm was sinus with heart rates ranging between 60 and 90 bpm.  There are rare isolated PAC and PVC recorded.  The patient had one episode of slightly wide QRS tachycardia lasting 5 beats which I suspect is an episode of ectopic atrial tachycardia with a rate of 180 bpm and very mild QRS widening.  The patient also had an episode of bradycardia with both sinus bradycardia and AV block with junctional escape (rates in the 29 bpm range) lasting roughly 8 seconds.  This occurred during the waking hours, but was " not associated with apparent symptoms.     Impression:    Abnormal multi-day patient activated monitor with an episode of bradycardia lasting approximately 8 seconds.  The episode demonstrated features consistent with increased vagal tone with both sinus bradycardia and loss of AV conduction with ventricular rates in the high 20s.  Further clinical correlation is warranted as this could be a potential cause for the patient's underlying symptoms of syncope and collapse.    No sustained atrial or ventricular tachyarrhythmia.    Echocardiogram 5/9/2023 (results reviewed):    1. Calculated left ventricular ejection fraction (modified Porter technique) is 62 %.    2. No regional wall motion abnormalities.    3. Normal right ventricular chamber size. Normal right ventricular systolic function.    4. Moderate, eccentric, posteriorly directed aortic valve regurgitation.     Carotid ultrasound 5/27/2020 (report reviewed):  1.  Mild plaque formation, velocities consistent with less than 50% stenosis in the right internal carotid artery.  2.  Moderate plaque formation, velocities consistent with 50-69% stenosis in the left internal carotid artery.  3.  Flow within the vertebral arteries is antegrade. However the left vertebral artery waveform severely blunted which is suggestive of significant proximal disease in the vessel.    Cardiac Cath 5/9/2023 (results reviewed):   DIAGNOSTIC - CORONARY   * Right dominant coronary artery system   * The left main artery has minimal disease.   * 50% stenosis in the mid LAD. Diffuse narrowing noted.   * 75% stenosis in the distal LAD   * 50% stenosis in the distal Circumflex   * The RCA is large in size.   * The RCA has mild disease.   * 50% stenosis in the proximal RAMIRO   * 30% stenosis in the proximal PDA   HEMODYNAMICS   * The LVEDP is moderately elevated at 21 mmHg     Cardiac Stress Testing 5/31/2017 (results reviewed):    No previous study available for comparison.    Lexiscan stress  ECG is negative for inducible myocardial ischemia.    Lexiscan stress nuclear study is negative for inducible myocardial ischemia or infarction.    Normal left ventricular size, wall motion and function. The calculated left ventricular ejection fraction is 66%.    Medical History  Surgical History Family History Social History   Past Medical History:   Diagnosis Date    Anxiety     Back pain     Cerebral vascular accident (H)     Chest pain     Chronic pain     Diarrhea     Dyslipidemia 9/1/2014    Gout     Hemorrhagic stroke (H)     Hypertension      Past Surgical History:   Procedure Laterality Date    CHOLECYSTECTOMY      IR MISCELLANEOUS PROCEDURE  3/9/2013    IR MISCELLANEOUS PROCEDURE  3/9/2013     Family History   Problem Relation Age of Onset    Cerebrovascular Disease Mother            Social History     Socioeconomic History    Marital status:      Spouse name: Not on file    Number of children: Not on file    Years of education: Not on file    Highest education level: Not on file   Occupational History    Not on file   Tobacco Use    Smoking status: Never    Smokeless tobacco: Not on file   Substance and Sexual Activity    Alcohol use: Yes    Drug use: No    Sexual activity: Not on file   Other Topics Concern    Not on file   Social History Narrative    Patient presented to the ED with his wife 04/25/17       Social Determinants of Health     Financial Resource Strain: Not on file   Food Insecurity: Not on file   Transportation Needs: Not on file   Physical Activity: Not on file   Stress: Not on file   Social Connections: Unknown (4/19/2023)    [facility]    Social Connections     Frequency of Communication with Friends and Family: Not on file   Interpersonal Safety: Not on file   Housing Stability: Not on file             Physical Examination   VITALS: BP (!) 141/73 (BP Location: Right arm)   Pulse 66    "Temp 97.7  F (36.5  C) (Oral)   Resp 20   Ht 1.6 m (5' 3\")   Wt 98 kg (216 lb 0.8 oz)   SpO2 95%   BMI 38.27 kg/m    BMI: Body mass index is 38.27 kg/m .  Wt Readings from Last 3 Encounters:   04/12/24 98 kg (216 lb 0.8 oz)   09/02/22 75.6 kg (166 lb 10.7 oz)   03/31/17 89.4 kg (197 lb)         Intake/Output Summary (Last 24 hours) at 4/12/2024 0809  Last data filed at 4/12/2024 0708  Gross per 24 hour   Intake 36.83 ml   Output 850 ml   Net -813.17 ml       General Appearance:  Alert, cooperative, no distress, appears stated age   Head:  Normocephalic, without obvious abnormality, atraumatic   Eyes:  PERRL, conjunctiva/corneas clear, EOM's intact   Ears:  Normal external ear canals bilaterally   Nose: Nares normal, septum midline, no drainage   Throat: Lips, mucosa, and tongue normal; teeth and gums normal   Neck: Supple, symmetrical, trachea midline, no adenopathy, no carotid bruit or JVD   Back:   Symmetric, no abnormal curvature, ROM normal   Lungs:   Clear to auscultation bilaterally, respirations unlabored   Chest Wall:  No tenderness or deformity   Heart:  Regular rate and rhythm, S1, S2 normal,no murmur, rub or gallop   Abdomen:   Soft, non-tender, bowel sounds active all four quadrants,  no masses, no organomegaly   Extremities: Extremities normal, atraumatic, no cyanosis or edema   Skin: Skin color, texture, turgor normal, no rashes or lesions   Psychiatric: Normal affect, pleasant and cooperative    Neurologic: Alert and oriented X 3, Moves all 4 extremities            Imaging      CTA chest 4/11/2024 (report reviewed):  1.  No abnormalities are seen to explain chest pain.  2.  Specifically, no evidence of pulmonary emboli, aortic aneurysm or dissection.  3.  Patient has developed an ulceration of the proximal SMA plaque which can be seen on the study done in 2020. The SMA proximally is ectatic and measures 1.2 cm, unchanged.  4.  Incidental 2 right renal arteries.    CT abdomen/pelvis 4/11/2024 " (report reviewed):  1.  The pancreas is unremarkable. No findings for acute pancreatitis.  2.  Mild fatty infiltration of the liver.  3.  Cholecystectomy.  4.  Advanced atherosclerotic disease with 3.9 x 3.1 cm infrarenal abdominal aortic aneurysm which has slightly increased in size.  5.  Small cysts both kidneys.  6.  Mild prostatic hypertrophy.     Lab Results    Chemistry/lipid CBC Cardiac Enzymes/BNP/TSH/INR   Recent Labs   Lab Test 04/11/24  1215   CHOL 210*   HDL 80   LDL 61   TRIG 343*     Recent Labs   Lab Test 04/11/24  1215 05/29/17  0623 03/17/17  0447   LDL 61 73 120     Recent Labs   Lab Test 04/12/24  0555      POTASSIUM 3.6   CHLORIDE 101   CO2 30*   *   BUN 26.3*   CR 0.78   GFRESTIMATED >90   SAHARA 9.0     Recent Labs   Lab Test 04/12/24  0555 04/11/24  1215 05/06/20  0520   CR 0.78 1.04 0.74     Recent Labs   Lab Test 03/17/17  0447   A1C 5.8          Recent Labs   Lab Test 04/12/24  0555   WBC 9.1   HGB 16.0   HCT 50.1   MCV 94   *     Recent Labs   Lab Test 04/12/24  0555 04/11/24  1215 05/06/20  0520   HGB 16.0 16.1 14.3    Recent Labs   Lab Test 05/06/20  0520 05/05/20  2148   TROPONINI 0.03 0.03     Recent Labs   Lab Test 04/11/24  1215   NTBNPI 1,801*     Recent Labs   Lab Test 05/05/20  2148   TSH 1.06     Recent Labs   Lab Test 04/11/24  1216 05/05/20  2148   INR 0.99 0.98           Current Inpatient Scheduled Medications   Scheduled Meds:  Current Facility-Administered Medications   Medication Dose Route Frequency Provider Last Rate Last Admin    allopurinol (ZYLOPRIM) tablet 300 mg  300 mg Oral BID Krish Linn DO        amLODIPine (NORVASC) tablet 10 mg  10 mg Oral Daily Krish Linn DO        aspirin EC tablet 81 mg  81 mg Oral Daily Krish Linn DO        furosemide (LASIX) injection 40 mg  40 mg Intravenous BID Krish Linn DO   40 mg at 04/11/24 1849    hydrochlorothiazide (HYDRODIURIL) tablet 50 mg  50 mg Oral Daily Krish Linn  DO        losartan (COZAAR) tablet 100 mg  100 mg Oral Daily Krish Linn,         potassium chloride garry ER (KLOR-CON M20) CR tablet 40 mEq  40 mEq Oral Once Krish Linn DO        rosuvastatin (CRESTOR) tablet 20 mg  20 mg Oral At Bedtime Krish Linn,          Continuous Infusions:  Current Facility-Administered Medications   Medication Dose Route Frequency Provider Last Rate Last Admin    heparin 25,000 units in 0.45% NaCl 250 mL ANTICOAGULANT infusion  0-5,000 Units/hr Intravenous Continuous Field, Janelle GUSTAFSON MD 7 mL/hr at 04/11/24 2330 700 Units/hr at 04/11/24 2330    Patient is already receiving anticoagulation with heparin, enoxaparin (LOVENOX), warfarin (COUMADIN)  or other anticoagulant medication   Does not apply Continuous PRN Krish Linn, DO                Medications Prior to Admission   Prior to Admission medications    Medication Sig Start Date End Date Taking? Authorizing Provider   acetaminophen (TYLENOL) 650 MG CR tablet Take 650 mg by mouth every 8 hours as needed   Yes Reported, Patient   allopurinol (ZYLOPRIM) 300 MG tablet Take 300 mg by mouth 2 times daily   Yes Reported, Patient   amLODIPine (NORVASC) 10 MG tablet Take 10 mg by mouth daily 1/8/24  Yes Reported, Patient   aspirin 81 MG EC tablet Take 81 mg by mouth daily 1/8/24  Yes Reported, Patient   CHOLECALCIFEROL 25 MCG (1000 UT) tablet Take 25 mcg by mouth daily 2/1/24  Yes Reported, Patient   diclofenac (VOLTAREN) 1 % topical gel Apply 4 g topically 4 times daily as needed   Yes Reported, Patient   losartan (COZAAR) 100 MG tablet Take 100 mg by mouth daily 2/1/24  Yes Reported, Patient   rosuvastatin (CRESTOR) 20 MG tablet Take 20 mg by mouth at bedtime 5/9/23  Yes Reported, Patient   traZODone (DESYREL) 150 MG tablet Take 150 mg by mouth nightly as needed   Yes Reported, Patient   hydrochlorothiazide (HYDRODIURIL) 25 MG tablet Take 50 mg by mouth daily    Reported, Patient          Carlos Trujillo MD  [facility]  Non-Invasive Cardiologist  [facility]  Pager [phone]

## 2024-04-12 NOTE — PROGRESS NOTES
Pt. Arrived to P3 at 2100. Wife and family at bedside. Telemetry NSR. Denies chest pain but endorsed ABD pain. PRN Tums given and helpful. Heparin drip infusing. Pt. Using urinal at bedside. Wife staying the night. NPO for cardiology consult. Vital signs stable. Will continue to monitor.    Roman Danielson RN

## 2024-04-12 NOTE — PROGRESS NOTES
[facility]    Medicine Progress Note - Hospitalist Service    Date of Admission:  4/11/2024    Assessment & Plan   73 year old male with history of prior CVA, CAD, diastolic CHF, aortic valve disease, gout, admitted on 4/11/2024 with NSTEMI     NSTEMI:  Concern for acute CHF exacerbation  History of CAD, aortic regurgitation, HFpEF  Presents with chest and lower abdominal pain and found to have elevated troponin with T wave inversions on EKG. BNP elevated.   CT chest negative for PE or dissection  There was incidental finding of ulceration of the proximal SMA plaque at was seen on prior study done in 2020.  There is a incidental finding of advanced atherosclerotic disease with 3.9 x 3.1 cm infrarenal abdominal aortic aneurysm which has slightly increased in size.   Review of prior coronary angiogram notable for LAD and distal circ disease  TTE shows preserved EF with no concerning WMA however, mitral leaflets appear thickened, hooded, and/or redundant, consistent  with myxomatous degeneration, mild to moderate mitral regurgitation, and mod-severe to severe (3-4+) aortic regurgitation.   -- continue IV heparin  -- continue diuresis  -- cardiology consulted and plan coronary angiogram  -- continue home ASA and statin and home amlodipine, hydrochlorothiazide, spironolactone and losartan  -- plan referral to valve clinic given TTE findings of aortic and mitral valve disease          Upper abdominal pain  Lipase mildly elevated but CT abdomen negative for pancreatitis.  CT does show ulceration of the proximal SMA plaque that was seen on prior study done in 2020.  There is a incidental finding of advanced atherosclerotic disease with 3.9 x 3.1 cm infrarenal abdominal aortic aneurysm which has slightly increased in size.   -- lactic acid normal  -- supportive cares        Prior stroke:   -- continue home asa and statin        H/O gout: continue home allopurinol           Diet: NPO for  "Medical/Clinical Reasons Except for: Ice Chips, Meds    DVT Prophylaxis: IV heparin  Eugene Catheter: Not present  Lines: None     Cardiac Monitoring: ACTIVE order. Indication: AMI (NSTEMI/ STEMI) (48 hours)  Code Status: Full Code      Clinically Significant Risk Factors Present on Admission                # Drug Induced Platelet Defect: home medication list includes an antiplatelet medication   # Hypertension: Noted on problem list      # Obesity: Estimated body mass index is 38.27 kg/m  as calculated from the following:    Height as of this encounter: 1.6 m (5' 3\").    Weight as of this encounter: 98 kg (216 lb 0.8 oz).              Disposition Plan   Medically Ready for Discharge: Anticipated Tomorrow      Krish Linn DO  Hospitalist Service  [facility]  Securely message with [email or web address] (more info)  Text page via [employer] Paging/Directory   ______________________________________________________________________    Interval History   NAD. Denies any complaints     Physical Exam   Vital Signs: Temp: 98.2  F (36.8  C) Temp src: Oral BP: 137/64 Pulse: 78   Resp: 16 SpO2: 92 % O2 Device: Nasal cannula Oxygen Delivery: 3 LPM  Weight: 216 lbs .81 oz  General: NAD  RESPIRATORY: Breathing nonlabored  CARDIOVASCULAR: 1+ le edema bilat.   NEUROLOGIC: Motor and sensory intact, speech clear         >50 MINUTES SPENT BY ME on the date of service doing chart review, history, exam, documentation & further activities per the note.  Data       "

## 2024-04-12 NOTE — PRE-PROCEDURE
GENERAL PRE-PROCEDURE:   Procedure:  Coronary angiogram with possible PCI, left heart catheterization  Date/Time:  4/12/2024 10:09 AM    Written consent obtained?: Yes    Risks and benefits: Risks, benefits and alternatives were discussed    Consent given by:  Patient  Patient states understanding of procedure being performed: Yes    Patient's understanding of procedure matches consent: Yes    Procedure consent matches procedure scheduled: Yes    Expected level of sedation:  Moderate  Appropriately NPO:  Yes  ASA Class:  4 (NSTEMI, CAD, HFpEF, history of vasovagal syncope, HLD, class II obesity; BMI 38.27 kg/m , sleep disordered breathing, infrarenal AAA; 3.9cm on CT)  Mallampati  :  Grade 3- soft palate visible, posterior pharyngeal wall not visible  Lungs:  Lungs clear with good breath sounds bilaterally  Heart:  Normal heart sounds and rate  History & Physical reviewed:  History and physical reviewed and updates made (see comment)  H&P Comments:  Clinically Significant Risk Factors Present on Admission    Cardiovascular : NSTEMI, CAD, HFpEF, history of vasovagal syncope, HLD, class II obesity; BMI 38.27 kg/m , infrarenal AAA; 3.9cm on CT    Fluid & Electrolyte Disorders : Not present on admission    Gastroenterology : Not present on admission    Hematology/Oncology : Not present on admission    Nephrology : Not present on admission    Neurology : Not present on admission    Pulmonology : Sleep disordered breathing; possible VICTORINA    Systemic : Not present on admission  Statement of review:  I have reviewed the lab findings, diagnostic data, medications, and the plan for sedation

## 2024-04-12 NOTE — PLAN OF CARE
"  Problem: Adult Inpatient Plan of Care  Goal: Plan of Care Review  Description: The Plan of Care Review/Shift note should be completed every shift.  The Outcome Evaluation is a brief statement about your assessment that the patient is improving, declining, or no change.  This information will be displayed automatically on your shift  note.  Outcome: Progressing  Goal: Patient-Specific Goal (Individualized)  Description: You can add care plan individualizations to a care plan. Examples of Individualization might be:  \"Parent requests to be called daily at 9am for status\", \"I have a hard time hearing out of my right ear\", or \"Do not touch me to wake me up as it startles  me\".  Outcome: Progressing     Problem: Risk for Delirium  Goal: Optimal Coping  Outcome: Progressing  Goal: Improved Behavioral Control  Outcome: Progressing  Intervention: Minimize Safety Risk  Recent Flowsheet Documentation  Taken 4/12/2024 0900 by Karissa Mccann RN  Enhanced Safety Measures:   pain management   review medications for side effects with activity   room near unit station  Goal: Improved Attention and Thought Clarity  Outcome: Progressing  Goal: Improved Sleep  Outcome: Progressing     Problem: Skin Injury Risk Increased  Goal: Skin Health and Integrity  Outcome: Progressing  Intervention: Plan: Nurse Driven Intervention: Moisture Management  Recent Flowsheet Documentation  Taken 4/12/2024 1000 by Karissa Mccann RN  Plan: Moisture Management: encourage regular toileting  Taken 4/12/2024 0900 by Karissa Mccann RN  Bathing/Skin Care: (doesn't want shower/bed bath) --  Plan: Moisture Management: encourage regular toileting  Intervention: Optimize Skin Protection  Recent Flowsheet Documentation  Taken 4/12/2024 0900 by Karissa Mccann RN  Activity Management: activity adjusted per tolerance  Head of Bed (HOB) Positioning: HOB at 20-30 degrees     Problem: Chest Pain  Goal: Resolution of Chest Pain Symptoms  Outcome: " Progressing     Goal Outcome Evaluation    Returned to floor from angio procedure. Air Removed from TR Band. Radial site is CDI. No bleeding noted. Pt is A0x4, RA. Sinus Rhythm on tele. Tolerated clear liquids & advanced to cardiac diet. Ate meal with fair appetite. Continent,urinal at bedside. Had BM. Family at bedside, call light within reach. Able to make needs known.

## 2024-04-12 NOTE — PLAN OF CARE
Problem: Chest Pain  Goal: Resolution of Chest Pain Symptoms  Outcome: Progressing     Problem: Adult Inpatient Plan of Care  Goal: Absence of Hospital-Acquired Illness or Injury  Outcome: Met  Intervention: Identify and Manage Fall Risk  Recent Flowsheet Documentation  Taken 4/12/2024 0030 by Roman Danielson RN  Safety Promotion/Fall Prevention:   activity supervised   clutter free environment maintained   nonskid shoes/slippers when out of bed   patient and family education   room near nurse's station  Taken 4/11/2024 2100 by Roman Danielson RN  Safety Promotion/Fall Prevention:   activity supervised   clutter free environment maintained   nonskid shoes/slippers when out of bed   patient and family education   room near nurse's station  Goal: Optimal Comfort and Wellbeing  Outcome: Met  Intervention: Monitor Pain and Promote Comfort  Recent Flowsheet Documentation  Taken 4/11/2024 2100 by Roman Danielson RN  Pain Management Interventions: medication (see MAR)   Goal Outcome Evaluation:       Pt. Alert and oriented. On telemetry NSR. Continues on Heparin drip. NPO for cardiology consult. O2 on desats in 80's when sleeping. Wife at bedside. Pt. Using urinal at bedside. Denied chest pain but did C/O ABD pain and requested PARRIS's for that. BP was elevated and prn Hydralazine given. Using call light for all needs. Will continue to monitor.    Roman Danielson RN

## 2024-04-12 NOTE — ED NOTES
Patient having chest pain EKG done,  Patient given one NTG and his chest pain is improving but not gone.  /69 pulse 83/ sat 95% on room air.

## 2024-04-12 NOTE — PLAN OF CARE
Patient AO x4, upon entering room patient appeared uncomfortable. BP checked and elevated, reporting 5/10 medial constant chest pain. Hydralazine and nitroglycerin given. Patient now endorsing chest pain has gone away.     Report given to P3 RN.

## 2024-04-13 LAB
ANION GAP SERPL CALCULATED.3IONS-SCNC: 11 MMOL/L (ref 7–15)
ATRIAL RATE - MUSE: 92 BPM
BUN SERPL-MCNC: 35.2 MG/DL (ref 8–23)
CALCIUM SERPL-MCNC: 9.4 MG/DL (ref 8.8–10.2)
CHLORIDE SERPL-SCNC: 99 MMOL/L (ref 98–107)
CREAT SERPL-MCNC: 1.14 MG/DL (ref 0.67–1.17)
DEPRECATED HCO3 PLAS-SCNC: 30 MMOL/L (ref 22–29)
DIASTOLIC BLOOD PRESSURE - MUSE: NORMAL MMHG
EGFRCR SERPLBLD CKD-EPI 2021: 68 ML/MIN/1.73M2
GLUCOSE SERPL-MCNC: 173 MG/DL (ref 70–99)
HOLD SPECIMEN: NORMAL
INTERPRETATION ECG - MUSE: NORMAL
MAGNESIUM SERPL-MCNC: 1.9 MG/DL (ref 1.7–2.3)
P AXIS - MUSE: 0 DEGREES
POTASSIUM SERPL-SCNC: 3.7 MMOL/L (ref 3.4–5.3)
PR INTERVAL - MUSE: 156 MS
QRS DURATION - MUSE: 90 MS
QT - MUSE: 384 MS
QTC - MUSE: 474 MS
R AXIS - MUSE: -14 DEGREES
SODIUM SERPL-SCNC: 140 MMOL/L (ref 135–145)
SYSTOLIC BLOOD PRESSURE - MUSE: NORMAL MMHG
T AXIS - MUSE: 55 DEGREES
TROPONIN T SERPL HS-MCNC: 76 NG/L
VENTRICULAR RATE- MUSE: 92 BPM

## 2024-04-13 PROCEDURE — 93010 ELECTROCARDIOGRAM REPORT: CPT | Performed by: INTERNAL MEDICINE

## 2024-04-13 PROCEDURE — 99232 SBSQ HOSP IP/OBS MODERATE 35: CPT | Performed by: GENERAL ACUTE CARE HOSPITAL

## 2024-04-13 PROCEDURE — 83735 ASSAY OF MAGNESIUM: CPT | Performed by: INTERNAL MEDICINE

## 2024-04-13 PROCEDURE — 36415 COLL VENOUS BLD VENIPUNCTURE: CPT

## 2024-04-13 PROCEDURE — 250N000013 HC RX MED GY IP 250 OP 250 PS 637

## 2024-04-13 PROCEDURE — 93005 ELECTROCARDIOGRAM TRACING: CPT

## 2024-04-13 PROCEDURE — 84484 ASSAY OF TROPONIN QUANT: CPT

## 2024-04-13 PROCEDURE — 36415 COLL VENOUS BLD VENIPUNCTURE: CPT | Performed by: INTERNAL MEDICINE

## 2024-04-13 PROCEDURE — 250N000013 HC RX MED GY IP 250 OP 250 PS 637: Performed by: INTERNAL MEDICINE

## 2024-04-13 PROCEDURE — 250N000013 HC RX MED GY IP 250 OP 250 PS 637: Performed by: GENERAL ACUTE CARE HOSPITAL

## 2024-04-13 PROCEDURE — 210N000001 HC R&B IMCU HEART CARE

## 2024-04-13 PROCEDURE — 250N000011 HC RX IP 250 OP 636: Performed by: GENERAL ACUTE CARE HOSPITAL

## 2024-04-13 PROCEDURE — 99232 SBSQ HOSP IP/OBS MODERATE 35: CPT | Performed by: INTERNAL MEDICINE

## 2024-04-13 PROCEDURE — 80048 BASIC METABOLIC PNL TOTAL CA: CPT | Performed by: INTERNAL MEDICINE

## 2024-04-13 RX ORDER — MAGNESIUM HYDROXIDE/ALUMINUM HYDROXICE/SIMETHICONE 120; 1200; 1200 MG/30ML; MG/30ML; MG/30ML
15 SUSPENSION ORAL ONCE
Status: COMPLETED | OUTPATIENT
Start: 2024-04-13 | End: 2024-04-13

## 2024-04-13 RX ADMIN — ROSUVASTATIN CALCIUM 20 MG: 10 TABLET, FILM COATED ORAL at 20:15

## 2024-04-13 RX ADMIN — HYDROCHLOROTHIAZIDE 50 MG: 25 TABLET ORAL at 09:34

## 2024-04-13 RX ADMIN — ALLOPURINOL 300 MG: 300 TABLET ORAL at 09:34

## 2024-04-13 RX ADMIN — LIDOCAINE 1 PATCH: 4 PATCH TOPICAL at 20:15

## 2024-04-13 RX ADMIN — FUROSEMIDE 40 MG: 10 INJECTION, SOLUTION INTRAMUSCULAR; INTRAVENOUS at 09:34

## 2024-04-13 RX ADMIN — FUROSEMIDE 40 MG: 10 INJECTION, SOLUTION INTRAMUSCULAR; INTRAVENOUS at 17:23

## 2024-04-13 RX ADMIN — SPIRONOLACTONE 25 MG: 25 TABLET, FILM COATED ORAL at 09:34

## 2024-04-13 RX ADMIN — FUROSEMIDE 40 MG: 10 INJECTION, SOLUTION INTRAMUSCULAR; INTRAVENOUS at 03:13

## 2024-04-13 RX ADMIN — LOSARTAN POTASSIUM 100 MG: 50 TABLET, FILM COATED ORAL at 09:33

## 2024-04-13 RX ADMIN — ACETAMINOPHEN 650 MG: 325 TABLET ORAL at 12:22

## 2024-04-13 RX ADMIN — ALLOPURINOL 300 MG: 300 TABLET ORAL at 20:15

## 2024-04-13 RX ADMIN — ASPIRIN 81 MG: 81 TABLET, COATED ORAL at 09:34

## 2024-04-13 RX ADMIN — ALUMINUM HYDROXIDE, MAGNESIUM HYDROXIDE, AND DIMETHICONE 15 ML: 200; 20; 200 SUSPENSION ORAL at 21:14

## 2024-04-13 RX ADMIN — NITROGLYCERIN 0.4 MG: 0.4 TABLET, ORALLY DISINTEGRATING SUBLINGUAL at 19:42

## 2024-04-13 RX ADMIN — AMLODIPINE BESYLATE 10 MG: 5 TABLET ORAL at 09:33

## 2024-04-13 ASSESSMENT — ACTIVITIES OF DAILY LIVING (ADL)
ADLS_ACUITY_SCORE: 37
ADLS_ACUITY_SCORE: 35
ADLS_ACUITY_SCORE: 37
ADLS_ACUITY_SCORE: 35
ADLS_ACUITY_SCORE: 37
ADLS_ACUITY_SCORE: 35
ADLS_ACUITY_SCORE: 37
ADLS_ACUITY_SCORE: 35
ADLS_ACUITY_SCORE: 37
ADLS_ACUITY_SCORE: 37
ADLS_ACUITY_SCORE: 35
ADLS_ACUITY_SCORE: 35
ADLS_ACUITY_SCORE: 37
ADLS_ACUITY_SCORE: 35
ADLS_ACUITY_SCORE: 35
ADLS_ACUITY_SCORE: 37
ADLS_ACUITY_SCORE: 35
ADLS_ACUITY_SCORE: 37

## 2024-04-13 NOTE — PROVIDER NOTIFICATION
Notified HO due to new chest pain. Told me his chest is hurting now. It was not hurting earlier. It is different than the pain that brought him in. Starts right to left. 4/10 constant. He is maybe is a little more uncomfortable with breathing. No other symptoms. See charted vitals.

## 2024-04-13 NOTE — PROGRESS NOTES
[facility]    Medicine Progress Note - Hospitalist Service    Date of Admission:  4/11/2024    Assessment & Plan   73 year old male with history of prior CVA, CAD, diastolic CHF, aortic valve disease, gout, admitted on 4/11/2024 with NSTEMI     NSTEMI:  Concern for acute on chronic diastolic CHF exacerbation  History of CAD, aortic regurgitation, mitral valve disease, HFpEF  Presents with chest and lower abdominal pain and found to have elevated troponin with T wave inversions on EKG. BNP elevated.   CT chest negative for PE or dissection  There was incidental finding of ulceration of the proximal SMA plaque at was seen on prior study done in 2020.  There is a incidental finding of advanced atherosclerotic disease with 3.9 x 3.1 cm infrarenal abdominal aortic aneurysm which has slightly increased in size.   Review of prior coronary angiogram notable for LAD and distal circ disease  TTE shows preserved EF with no concerning WMA however, mitral leaflets appear thickened, hooded, and/or redundant, consistent  with myxomatous degeneration, mild to moderate mitral regurgitation, and mod-severe to severe (3-4+) aortic regurgitation.   Coronary angiogram 4/12 showed stable CAD and intervention not performed. Elevated LVEDP indicative of CHF   -- continue diuresis  -- cardiology following  -- continue home ASA and statin  -- continue home amlodipine, hydrochlorothiazide, spironolactone and losartan  -- plan referral to valve clinic given TTE findings of aortic and mitral valve disease          Upper abdominal pain  Lipase mildly elevated but CT abdomen negative for pancreatitis.  CT does show ulceration of the proximal SMA plaque that was seen on prior study done in 2020.  There is a incidental finding of advanced atherosclerotic disease with 3.9 x 3.1 cm infrarenal abdominal aortic aneurysm which has slightly increased in size.   -- lactic acid normal  -- supportive cares  -- now complaining of  "more chest pain that is worse with palpation so will try tylenol and lidocaine patch        Prior hemorrhagic stroke:   -- continue home asa and statin        H/O gout: continue home allopurinol           Diet: Low Saturated Fat Na <2400 mg    DVT Prophylaxis: SCD  Eugene Catheter: Not present  Lines: None     Cardiac Monitoring: ACTIVE order. Indication: Post- PCI/Angiogram (24 hours)  Code Status: Full Code      Clinically Significant Risk Factors                # Thrombocytopenia: Lowest platelets = 122 in last 2 days, will monitor for bleeding   # Hypertension: Noted on problem list        # Obesity: Estimated body mass index is 31.8 kg/m  as calculated from the following:    Height as of this encounter: 1.6 m (5' 3\").    Weight as of this encounter: 81.4 kg (179 lb 8 oz)., PRESENT ON ADMISSION            Disposition Plan   Medically Ready for Discharge: Anticipated Tomorrow      Krish Linn DO  Hospitalist Service  [facility]  Securely message with [employer] (more info)  Text page via [employer] Paging/Directory   ______________________________________________________________________    Interval History   NAD. Denies any complaints besides mild mid-chest pain    Physical Exam   Vital Signs: Temp: 98.3  F (36.8  C) Temp src: Oral BP: 134/59 Pulse: 93   Resp: 20 SpO2: 92 % O2 Device: None (Room air) Oxygen Delivery: 3 LPM  Weight: 179 lbs 8 oz  General: NAD  RESPIRATORY: Breathing nonlabored  CARDIOVASCULAR: 1+ le edema bilat. CP reproducible on palpation  NEUROLOGIC: Motor and sensory intact, speech clear         >45 MINUTES SPENT BY ME on the date of service doing chart review, history, exam, documentation & further activities per the note.  Data       "

## 2024-04-13 NOTE — PLAN OF CARE
Goal Outcome Evaluation:       No acute issues overnight. Vitals stable. Pt denied any chest pain overnight. Angiogram site to R radial without complications noted. +CMS.

## 2024-04-13 NOTE — PROGRESS NOTES
Impression and Plan     Impression:   Non-ST elevation myocardial infarction. Suspect this is demand-mediated (type II) from uncontrolled hypertension and decompensated congestive heart failure. Coronary angiography 4/122024 showed no progression of coronary artery disease. He currently denies symptoms.  Acute on chronic congestive heart failure with preserved left ventricular ejection fraction. Uncontrolled hypertension is probably the main  of this. His aortic regurgitation is not obviously severe yet with normal left ventricular cavity size and systolic function thus far. He may still be a bit hypervolemic.  Moderate-to-severe eccentric aortic regurgitation which may be due to prolapse of the right coronary cusp. Probably not the cause of his symptoms as discussed above.  Coronary artery disease with severe stenosis of the distal left anterior descending artery and otherwise nonobstructive disease seen on coronary angiography 4/12/2023  History of hemorrhagic stroke 3/15/2017 due to spontaneous right basal ganglia bleed. This was thought to be due to uncontrolled hypertension.  Moderate left internal carotid artery stenosis last assessed on carotid ultrasound 5/27/2020.  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 This is felt to be a chronic finding and he has no signs or symptoms of mesenteric ischemia.  Infrarenal abdominal aortic aneurysm measuring 3.9 cm noted on CT 4/11/2024.  Syncopal episode 5/5/2020 felt to be due to vasovagal syncope with evidence of this on follow-up ambulatory cardiac monitoring 5/28/2022.  Essential hypertension. Controlled now.  Hyperlipidemia. Last LDL 61 mg/dL.  Possible obstructive sleep apnea.  Obesity with body mass index 38.27 kg/m .    Plan:  Continue intravenous furosemide, anticipate transition to oral diuretics tomorrow  Discontinue hydrochlorothiazide as he will discharge home on an oral loop diuretic  Spironolactone 25  "mg daily  Hold on starting beta blocker therapy with his prior history of vagally-mediated bradyarrhythmias  Rosuvastatin 20 mg daily  He should have an outpatient sleep medicine consultation  He can follow-up with his primary cardiologist at [facility] to determine if further evaluation for his aortic regurgitation is needed    Primary Cardiologist: sees Dr. Manohar Marie with [facility]     Subjective     - breathing and chest pain are a bit better    Cardiac Diagnostics   Telemetry (personally reviewed): SR, no arrhythmias    ECG 4/12/2024 (personally reviewed and interpreted): SR, poor anterior R wave progression    Cardiac event monitor 5/8/2020 (report reviewed):  Results:    Indication for study: Syncope and collapse    The monitor was scheduled for 30 days and worn for 22 days.  There was 20 days 5 hours of interpretable data.  Compliance with the monitor was fair.    The baseline rhythm transmission demonstrated normal sinus rhythm with heart rates in the 80s.  The UT interval, QRS duration and QT intervals all appear to be normal.    The patient had 2 manually activated rhythm recordings.  Symptoms were reported as \"other\".  The patient's rhythm demonstrated sinus rhythm with heart rates in the 70s.  There was no pathologic rhythm disturbance demonstrated.      The patient had 25 auto triggered recordings.  Symptoms were not reported.  The patient's rhythm demonstrated for the most part the patient's rhythm was sinus with heart rates ranging between 60 and 90 bpm.  There are rare isolated PAC and PVC recorded.  The patient had one episode of slightly wide QRS tachycardia lasting 5 beats which I suspect is an episode of ectopic atrial tachycardia with a rate of 180 bpm and very mild QRS widening.  The patient also had an episode of bradycardia with both sinus bradycardia and AV block with junctional escape (rates in the 29 bpm range) lasting roughly 8 seconds.  " This occurred during the waking hours, but was not associated with apparent symptoms.     Impression:    Abnormal multi-day patient activated monitor with an episode of bradycardia lasting approximately 8 seconds.  The episode demonstrated features consistent with increased vagal tone with both sinus bradycardia and loss of AV conduction with ventricular rates in the high 20s.  Further clinical correlation is warranted as this could be a potential cause for the patient's underlying symptoms of syncope and collapse.    No sustained atrial or ventricular tachyarrhythmia.     Echocardiogram 4/11/2024 (report reviewed):  The left ventricle is normal in size.  There is mild concentric left ventricular hypertrophy.  The visual ejection fraction is 55-60%.  No regional wall motion abnormalities noted.  The right ventricle is normal size.  The right ventricular systolic function is normal.  The mitral valve leaflets are mildly thickened.  The mitral leaflets appear thickened, hooded, and/or redundant, consistent with myxomatous degeneration.  Redundant elongated chordae are noted.  There is mild to moderate (1-2+) mitral regurgitation.  Cannot see the individual leaflets of the AV on this study.  Trivial pericardial effusion  Sinus rhythm was noted.  There is no comparison study available.    Echocardiogram 5/9/2023 (results reviewed):    1. Calculated left ventricular ejection fraction (modified Porter technique) is 62 %.    2. No regional wall motion abnormalities.    3. Normal right ventricular chamber size. Normal right ventricular systolic function.    4. Moderate, eccentric, posteriorly directed aortic valve regurgitation.      Carotid ultrasound 5/27/2020 (report reviewed):  1.  Mild plaque formation, velocities consistent with less than 50% stenosis in the right internal carotid artery.  2.  Moderate plaque formation, velocities consistent with 50-69% stenosis in the left internal carotid artery.  3.  Flow within  "the vertebral arteries is antegrade. However the left vertebral artery waveform severely blunted which is suggestive of significant proximal disease in the vessel.     Cardiac cath 4/12/2024 (report reviewed):  1.  Left main large, no stenoses.  2.  LAD has diffuse lumen irregularity proximal to mid segment; moderate stenoses mid segment and 70 to 80% segment apical LAD.    3.  Left circumflex system appears normal.  4.  Huge aneurysmal right coronary artery with diffuse luminal irregularity.  Mild plaquing proximal PDA and apical PDA segment.  No apparent flow-limiting stenoses.  5.  No apparent change since angiogram in January at [facility].  No  \"unstable plaque\".  Probable demand ischemia due to fixed coronary disease and severe hypertension.  Patient suboptimal candidate for stenting due to history of CNS bleed and anticipated requirement of dual antiplatelet therapy post stent.  Could consider stand-alone PTCA of apical LAD segment if symptoms persist or recur.    Cardiac Cath 5/9/2023 (results reviewed):   DIAGNOSTIC - CORONARY   * Right dominant coronary artery system   * The left main artery has minimal disease.   * 50% stenosis in the mid LAD. Diffuse narrowing noted.   * 75% stenosis in the distal LAD   * 50% stenosis in the distal Circumflex   * The RCA is large in size.   * The RCA has mild disease.   * 50% stenosis in the proximal RAMIRO   * 30% stenosis in the proximal PDA   HEMODYNAMICS   * The LVEDP is moderately elevated at 21 mmHg      Cardiac Stress Testing 5/31/2017 (results reviewed):    No previous study available for comparison.    Lexiscan stress ECG is negative for inducible myocardial ischemia.    Lexiscan stress nuclear study is negative for inducible myocardial ischemia or infarction.    Normal left ventricular size, wall motion and function. The calculated left ventricular ejection fraction is 66%.    Physical Examination       /59 (BP Location: Left arm)   Pulse 93   Temp " "98.3  F (36.8  C) (Oral)   Resp 20   Ht 1.6 m (5' 3\")   Wt 81.4 kg (179 lb 8 oz)   SpO2 92%   BMI 31.80 kg/m          Wt Readings from Last 3 Encounters:   04/13/24 81.4 kg (179 lb 8 oz)   09/02/22 75.6 kg (166 lb 10.7 oz)   03/31/17 89.4 kg (197 lb)             Intake/Output Summary (Last 24 hours) at 4/13/2024 1500  Last data filed at 4/13/2024 1221  Gross per 24 hour   Intake 470 ml   Output 1870 ml   Net -1400 ml       General: pleasant male. No acute distress.  HENT: external ears normal. Nares patent. Mucous membranes moist.  Eyes: perrla, extraocular muscles intact. No scleral icterus.   Neck: JVP may be a bit elevated at 8-10 cm H2O.  Lungs: clear to auscultation  COR: regular rate and rhythm. 2/6 systolic murmur. No rubs or gallops  Abd: nondistended, BS present  Extrem: No edema         Imaging      CTA chest 4/11/2024 (report reviewed):  1.  No abnormalities are seen to explain chest pain.  2.  Specifically, no evidence of pulmonary emboli, aortic aneurysm or dissection.  3.  Patient has developed an ulceration of the proximal SMA plaque which can be seen on the study done in 2020. The SMA proximally is ectatic and measures 1.2 cm, unchanged.  4.  Incidental 2 right renal arteries.     CT abdomen/pelvis 4/11/2024 (report reviewed):  1.  The pancreas is unremarkable. No findings for acute pancreatitis.  2.  Mild fatty infiltration of the liver.  3.  Cholecystectomy.  4.  Advanced atherosclerotic disease with 3.9 x 3.1 cm infrarenal abdominal aortic aneurysm which has slightly increased in size.  5.  Small cysts both kidneys.  6.  Mild prostatic hypertrophy.    Lab Results   Lab Results   Component Value Date     04/13/2024    CO2 30 04/13/2024    CO2 25 05/06/2020    BUN 35.2 04/13/2024    BUN 15 05/06/2020     Lab Results   Component Value Date    WBC 9.1 04/12/2024    HGB 16.0 04/12/2024    HCT 50.1 04/12/2024    MCV 94 04/12/2024     04/12/2024     Lab Results   Component Value Date    " CHOL 210 04/11/2024    TRIG 343 04/11/2024    HDL 80 04/11/2024     Lab Results   Component Value Date    INR 0.99 04/11/2024     Lab Results   Component Value Date    TROPONINI 0.03 05/06/2020    TROPONINI 0.03 05/05/2020     Lab Results   Component Value Date    TSH 1.06 05/05/2020           Current Inpatient Scheduled Medications   Scheduled Meds:  Current Facility-Administered Medications   Medication Dose Route Frequency Provider Last Rate Last Admin    allopurinol (ZYLOPRIM) tablet 300 mg  300 mg Oral BID Krish Linn DO   300 mg at 04/13/24 0934    amLODIPine (NORVASC) tablet 10 mg  10 mg Oral Daily Krish Linn DO   10 mg at 04/13/24 0933    aspirin EC tablet 81 mg  81 mg Oral Daily Krish Linn DO   81 mg at 04/13/24 0934    furosemide (LASIX) injection 40 mg  40 mg Intravenous Q8H Carlos Trujillo MD   40 mg at 04/13/24 0934    hydrochlorothiazide (HYDRODIURIL) tablet 50 mg  50 mg Oral Daily Krish Linn DO   50 mg at 04/13/24 0934    Lidocaine (LIDOCARE) 4 % Patch 1 patch  1 patch Transdermal Q24h Lydia Dorado DO   1 patch at 04/12/24 2141    losartan (COZAAR) tablet 100 mg  100 mg Oral Daily Krish Linn DO   100 mg at 04/13/24 0933    rosuvastatin (CRESTOR) tablet 20 mg  20 mg Oral At Bedtime Krish Linn DO   20 mg at 04/12/24 2127    spironolactone (ALDACTONE) tablet 25 mg  25 mg Oral Daily Carlos Trujillo MD   25 mg at 04/13/24 0934     Continuous Infusions:  Current Facility-Administered Medications   Medication Dose Route Frequency Provider Last Rate Last Admin    Patient is already receiving anticoagulation with heparin, enoxaparin (LOVENOX), warfarin (COUMADIN)  or other anticoagulant medication   Does not apply Continuous PRN Krish Linn DO                Medications Prior to Admission   Prior to Admission medications    Medication Sig Start Date End Date Taking? Authorizing Provider   acetaminophen (TYLENOL) 650 MG CR tablet Take 650 mg by mouth  every 8 hours as needed   Yes Reported, Patient   allopurinol (ZYLOPRIM) 300 MG tablet Take 300 mg by mouth 2 times daily   Yes Reported, Patient   amLODIPine (NORVASC) 10 MG tablet Take 10 mg by mouth daily 1/8/24  Yes Reported, Patient   aspirin 81 MG EC tablet Take 81 mg by mouth daily 1/8/24  Yes Reported, Patient   CHOLECALCIFEROL 25 MCG (1000 UT) tablet Take 25 mcg by mouth daily 2/1/24  Yes Reported, Patient   diclofenac (VOLTAREN) 1 % topical gel Apply 4 g topically 4 times daily as needed   Yes Reported, Patient   losartan (COZAAR) 100 MG tablet Take 100 mg by mouth daily 2/1/24  Yes Reported, Patient   rosuvastatin (CRESTOR) 20 MG tablet Take 20 mg by mouth at bedtime 5/9/23  Yes Reported, Patient   traZODone (DESYREL) 150 MG tablet Take 150 mg by mouth nightly as needed   Yes Reported, Patient   hydrochlorothiazide (HYDRODIURIL) 25 MG tablet Take 50 mg by mouth daily    Reported, Patient          Carlos Trujillo MD [facility]  Non-Invasive Cardiologist  [facility]  Pager [phone]

## 2024-04-13 NOTE — PLAN OF CARE
"..Heart Failure Care Map  GOALS TO BE MET BEFORE DISCHARGE:    1. Decrease congestion and/or edema with diuretic therapy to achieve near optimal volume status.     Dyspnea improved: Yes, satisfactory for discharge.   Edema improved: No, further care required to meet this goal. Please explain 1+ to 2+ edema Iv lasix given Q8        Last 24 hour I/O:   Intake/Output Summary (Last 24 hours) at 4/13/2024 1757  Last data filed at 4/13/2024 1221  Gross per 24 hour   Intake 470 ml   Output 1870 ml   Net -1400 ml           Net I/O and Weights since admission:   03/14 2300 - 04/13 2259  In: 756.83 [P.O.:720; I.V.:36.83]  Out: 3840 [Urine:3840]  Net: -3083.17     Vitals:    04/11/24 1203 04/12/24 0425 04/13/24 0608   Weight: 85.3 kg (188 lb) 98 kg (216 lb 0.8 oz) 81.4 kg (179 lb 8 oz)       2.  O2 sats > 90% on room air, or at prior home O2 therapy level.      Able to wean O2 this shift to keep sats above 90%?: Yes, satisfactory for discharge.   Does patient use Home O2? No          Current oxygenation status:   SpO2: 90 %     O2 Device: None (Room air),      3.  Tolerates ambulation and mobility near baseline.     Ambulation: No, further care required to meet this goal. Please explain needs encouragement to walk more.    Times patient ambulated with staff this shift: 1    Continues to have mild mid chest pain. Improved to \"barley there\" after tylenol. Lido patch to be placed this evening.     Please review the Heart Failure Care Map for additional HF goal outcomes.    Roslyn Rodas, RN  4/13/2024    "

## 2024-04-13 NOTE — SIGNIFICANT EVENT
"Significant Event Note    Time of event: 9:23 PM April 12, 2024    Description of event:  Paged by RN for patient concern of chest pain. Briefly, patient admitted for NSTEMI. He had a TTE which showed preserved EF with no concerning WMA. He underwent angiogram which showed no change in CAD. Plan is to continue IV diuresis with possible transition to oral diuretics tomorrow.     Vitally, patient remains stable.     Upon assessment, patient laying comfortably in bed. Wished him a very Happy Birthday! States his pain is doing better. Hurts more with movement or touching.    /66   Pulse 77   Temp 98  F (36.7  C)   Resp 20   Ht 1.6 m (5' 3\")   Wt 98 kg (216 lb 0.8 oz)   SpO2 94%   BMI 38.27 kg/m    GENERAL: healthy, alert and no distress  RESP: speaking in full sentences, normal work of breathing   CV: extremities well perfused  PSYCH: mentation appears normal, affect normal/bright    Plan:  - will obtain EKG for further evaluation  - will trial Nitroglycerin PRN     Discussed with: bedside nurse    Lydia Dorado, DO      9:38 PM  EKG with no significant change from prior. Nitroglycerin without much help.     Patient notes more pain with touching/moving. Will trial lidocaine patch and other pain medication PRN.       "

## 2024-04-13 NOTE — PLAN OF CARE
Goal Outcome Evaluation:      Plan of Care Reviewed With: patient, family    Overall Patient Progress: improving  Problem: Adult Inpatient Plan of Care  Goal: Plan of Care Review  Description: The Plan of Care Review/Shift note should be completed every shift.  The Outcome Evaluation is a brief statement about your assessment that the patient is improving, declining, or no change.  This information will be displayed automatically on your shift  note.  4/12/2024 2243 by Wendy Landers RN  Outcome: Progressing  Flowsheets (Taken 4/12/2024 2243)  Overall Patient Progress: improving  4/12/2024 2242 by Wendy Landers RN  Outcome: Progressing  Flowsheets (Taken 4/12/2024 2242)  Plan of Care Reviewed With:   patient   family  Overall Patient Progress: improving     Problem: Skin Injury Risk Increased  Goal: Skin Health and Integrity  Outcome: Progressing  Intervention: Plan: Nurse Driven Intervention: Moisture Management  Recent Flowsheet Documentation  Taken 4/12/2024 2033 by Wendy Landers RN  Bathing/Skin Care: incontinence care  Plan: Moisture Management: encourage regular toileting  Intervention: Optimize Skin Protection  Recent Flowsheet Documentation  Taken 4/12/2024 2033 by Wendy Landers RN  Activity Management: activity adjusted per tolerance  Head of Bed (HOB) Positioning: HOB at 20-30 degrees     Problem: Cardiac Catheterization (Diagnostic/Interventional)  Goal: Absence of Bleeding  Outcome: Progressing  Goal: Stable Heart Rate and Rhythm  Outcome: Progressing  Goal: Optimal Pain Control and Function  Outcome: Progressing  Sedrick had a angio today without intervention. See notes about chest pain this evening. Remains in sinus rhythm with stable vitals.  Now resolved and he is sleeping. EKG completed. Had large BM and adequate urine output this shift. Hoping to discharge in the am

## 2024-04-14 ENCOUNTER — APPOINTMENT (OUTPATIENT)
Dept: RADIOLOGY | Facility: HOSPITAL | Age: 74
DRG: 280 | End: 2024-04-14
Attending: INTERNAL MEDICINE
Payer: COMMERCIAL

## 2024-04-14 PROBLEM — N17.9 AKI (ACUTE KIDNEY INJURY) (H): Status: ACTIVE | Noted: 2024-04-14

## 2024-04-14 LAB
ANION GAP SERPL CALCULATED.3IONS-SCNC: 10 MMOL/L (ref 7–15)
ATRIAL RATE - MUSE: 67 BPM
ATRIAL RATE - MUSE: 68 BPM
ATRIAL RATE - MUSE: 95 BPM
BUN SERPL-MCNC: 43.2 MG/DL (ref 8–23)
CALCIUM SERPL-MCNC: 9.4 MG/DL (ref 8.8–10.2)
CHLORIDE SERPL-SCNC: 94 MMOL/L (ref 98–107)
CORTIS SERPL-MCNC: 6.3 UG/DL
CREAT SERPL-MCNC: 1.38 MG/DL (ref 0.67–1.17)
DEPRECATED HCO3 PLAS-SCNC: 32 MMOL/L (ref 22–29)
DIASTOLIC BLOOD PRESSURE - MUSE: 81 MMHG
DIASTOLIC BLOOD PRESSURE - MUSE: NORMAL MMHG
DIASTOLIC BLOOD PRESSURE - MUSE: NORMAL MMHG
EGFRCR SERPLBLD CKD-EPI 2021: 54 ML/MIN/1.73M2
ERYTHROCYTE [DISTWIDTH] IN BLOOD BY AUTOMATED COUNT: 13.6 % (ref 10–15)
GLUCOSE SERPL-MCNC: 159 MG/DL (ref 70–99)
HCT VFR BLD AUTO: 52.1 % (ref 40–53)
HGB BLD-MCNC: 16.5 G/DL (ref 13.3–17.7)
HOLD SPECIMEN: NORMAL
INTERPRETATION ECG - MUSE: NORMAL
MAGNESIUM SERPL-MCNC: 2 MG/DL (ref 1.7–2.3)
MCH RBC QN AUTO: 30.1 PG (ref 26.5–33)
MCHC RBC AUTO-ENTMCNC: 31.7 G/DL (ref 31.5–36.5)
MCV RBC AUTO: 95 FL (ref 78–100)
P AXIS - MUSE: 2 DEGREES
P AXIS - MUSE: 36 DEGREES
P AXIS - MUSE: 38 DEGREES
PLATELET # BLD AUTO: 101 10E3/UL (ref 150–450)
POTASSIUM SERPL-SCNC: 4 MMOL/L (ref 3.4–5.3)
PR INTERVAL - MUSE: 158 MS
PR INTERVAL - MUSE: 158 MS
PR INTERVAL - MUSE: 164 MS
PROCALCITONIN SERPL IA-MCNC: 0.11 NG/ML
QRS DURATION - MUSE: 86 MS
QRS DURATION - MUSE: 86 MS
QRS DURATION - MUSE: 92 MS
QT - MUSE: 362 MS
QT - MUSE: 402 MS
QT - MUSE: 414 MS
QTC - MUSE: 427 MS
QTC - MUSE: 437 MS
QTC - MUSE: 454 MS
R AXIS - MUSE: -13 DEGREES
R AXIS - MUSE: -21 DEGREES
R AXIS - MUSE: 37 DEGREES
RBC # BLD AUTO: 5.48 10E6/UL (ref 4.4–5.9)
SODIUM SERPL-SCNC: 136 MMOL/L (ref 135–145)
SYSTOLIC BLOOD PRESSURE - MUSE: 205 MMHG
SYSTOLIC BLOOD PRESSURE - MUSE: NORMAL MMHG
SYSTOLIC BLOOD PRESSURE - MUSE: NORMAL MMHG
T AXIS - MUSE: 2 DEGREES
T AXIS - MUSE: 79 DEGREES
T AXIS - MUSE: 88 DEGREES
VENTRICULAR RATE- MUSE: 67 BPM
VENTRICULAR RATE- MUSE: 68 BPM
VENTRICULAR RATE- MUSE: 95 BPM
WBC # BLD AUTO: 8 10E3/UL (ref 4–11)

## 2024-04-14 PROCEDURE — 250N000013 HC RX MED GY IP 250 OP 250 PS 637: Performed by: GENERAL ACUTE CARE HOSPITAL

## 2024-04-14 PROCEDURE — 82533 TOTAL CORTISOL: CPT | Performed by: INTERNAL MEDICINE

## 2024-04-14 PROCEDURE — 85027 COMPLETE CBC AUTOMATED: CPT | Performed by: INTERNAL MEDICINE

## 2024-04-14 PROCEDURE — P9047 ALBUMIN (HUMAN), 25%, 50ML: HCPCS | Performed by: INTERNAL MEDICINE

## 2024-04-14 PROCEDURE — 250N000013 HC RX MED GY IP 250 OP 250 PS 637: Performed by: INTERNAL MEDICINE

## 2024-04-14 PROCEDURE — 99232 SBSQ HOSP IP/OBS MODERATE 35: CPT | Performed by: GENERAL ACUTE CARE HOSPITAL

## 2024-04-14 PROCEDURE — 258N000003 HC RX IP 258 OP 636: Performed by: INTERNAL MEDICINE

## 2024-04-14 PROCEDURE — 250N000013 HC RX MED GY IP 250 OP 250 PS 637

## 2024-04-14 PROCEDURE — 84145 PROCALCITONIN (PCT): CPT | Performed by: INTERNAL MEDICINE

## 2024-04-14 PROCEDURE — 36415 COLL VENOUS BLD VENIPUNCTURE: CPT | Performed by: INTERNAL MEDICINE

## 2024-04-14 PROCEDURE — 80048 BASIC METABOLIC PNL TOTAL CA: CPT | Performed by: INTERNAL MEDICINE

## 2024-04-14 PROCEDURE — 83735 ASSAY OF MAGNESIUM: CPT | Performed by: INTERNAL MEDICINE

## 2024-04-14 PROCEDURE — 93010 ELECTROCARDIOGRAM REPORT: CPT | Performed by: INTERNAL MEDICINE

## 2024-04-14 PROCEDURE — 210N000001 HC R&B IMCU HEART CARE

## 2024-04-14 PROCEDURE — 250N000011 HC RX IP 250 OP 636: Performed by: GENERAL ACUTE CARE HOSPITAL

## 2024-04-14 PROCEDURE — 71045 X-RAY EXAM CHEST 1 VIEW: CPT

## 2024-04-14 PROCEDURE — 250N000011 HC RX IP 250 OP 636: Performed by: INTERNAL MEDICINE

## 2024-04-14 PROCEDURE — 99233 SBSQ HOSP IP/OBS HIGH 50: CPT | Performed by: INTERNAL MEDICINE

## 2024-04-14 RX ORDER — BUMETANIDE 1 MG/1
1 TABLET ORAL DAILY
Status: DISCONTINUED | OUTPATIENT
Start: 2024-04-14 | End: 2024-04-15 | Stop reason: HOSPADM

## 2024-04-14 RX ORDER — MIDODRINE HYDROCHLORIDE 5 MG/1
5 TABLET ORAL ONCE
Qty: 1 TABLET | Refills: 0 | Status: COMPLETED | OUTPATIENT
Start: 2024-04-14 | End: 2024-04-14

## 2024-04-14 RX ORDER — ALBUMIN (HUMAN) 12.5 G/50ML
25 SOLUTION INTRAVENOUS ONCE
Status: COMPLETED | OUTPATIENT
Start: 2024-04-14 | End: 2024-04-14

## 2024-04-14 RX ORDER — SUCRALFATE 1 G/1
1 TABLET ORAL
Status: DISCONTINUED | OUTPATIENT
Start: 2024-04-14 | End: 2024-04-15

## 2024-04-14 RX ORDER — AMLODIPINE BESYLATE 5 MG/1
10 TABLET ORAL DAILY
Status: CANCELLED | OUTPATIENT
Start: 2024-04-15

## 2024-04-14 RX ORDER — ISOSORBIDE MONONITRATE 30 MG/1
30 TABLET, EXTENDED RELEASE ORAL DAILY
Status: DISCONTINUED | OUTPATIENT
Start: 2024-04-14 | End: 2024-04-14

## 2024-04-14 RX ADMIN — ALLOPURINOL 300 MG: 300 TABLET ORAL at 09:11

## 2024-04-14 RX ADMIN — ROSUVASTATIN CALCIUM 20 MG: 10 TABLET, FILM COATED ORAL at 20:33

## 2024-04-14 RX ADMIN — ASPIRIN 81 MG: 81 TABLET, COATED ORAL at 09:12

## 2024-04-14 RX ADMIN — MIDODRINE HYDROCHLORIDE 5 MG: 5 TABLET ORAL at 14:01

## 2024-04-14 RX ADMIN — FUROSEMIDE 40 MG: 10 INJECTION, SOLUTION INTRAMUSCULAR; INTRAVENOUS at 00:00

## 2024-04-14 RX ADMIN — ALBUMIN HUMAN 25 G: 0.25 SOLUTION INTRAVENOUS at 11:55

## 2024-04-14 RX ADMIN — ISOSORBIDE MONONITRATE 30 MG: 30 TABLET, EXTENDED RELEASE ORAL at 10:24

## 2024-04-14 RX ADMIN — SUCRALFATE 1 G: 1 TABLET ORAL at 07:59

## 2024-04-14 RX ADMIN — SUCRALFATE 1 G: 1 TABLET ORAL at 12:17

## 2024-04-14 RX ADMIN — ALLOPURINOL 300 MG: 300 TABLET ORAL at 20:33

## 2024-04-14 RX ADMIN — BUMETANIDE 1 MG: 1 TABLET ORAL at 09:15

## 2024-04-14 RX ADMIN — LOSARTAN POTASSIUM 100 MG: 50 TABLET, FILM COATED ORAL at 09:11

## 2024-04-14 RX ADMIN — SPIRONOLACTONE 25 MG: 25 TABLET, FILM COATED ORAL at 09:11

## 2024-04-14 RX ADMIN — AMLODIPINE BESYLATE 10 MG: 5 TABLET ORAL at 09:11

## 2024-04-14 RX ADMIN — LIDOCAINE 1 PATCH: 4 PATCH TOPICAL at 20:30

## 2024-04-14 RX ADMIN — SODIUM CHLORIDE 1000 ML: 9 INJECTION, SOLUTION INTRAVENOUS at 13:25

## 2024-04-14 RX ADMIN — SUCRALFATE 1 G: 1 TABLET ORAL at 16:29

## 2024-04-14 ASSESSMENT — ACTIVITIES OF DAILY LIVING (ADL)
ADLS_ACUITY_SCORE: 35
ADLS_ACUITY_SCORE: 35
ADLS_ACUITY_SCORE: 36
ADLS_ACUITY_SCORE: 35
ADLS_ACUITY_SCORE: 36
ADLS_ACUITY_SCORE: 36
ADLS_ACUITY_SCORE: 35

## 2024-04-14 NOTE — PLAN OF CARE
Pt alertx4, lung sounds diminished. At 1940 Pt c/o chest pain 5/10 and SpO2 88% placed on 1 L NC, gave SL nitroglycerin  0.4 mg at 1942. Reassessed at 1947 no pain relief, SpO2 88-89%, increased to 2 L NC. MD notified. Placed schedule lidocaine patch, discussed and had pt show me where pain was and pt pointed to stomach region. Reassessment of pain 2/10, at 2114 gave MD ordered Maalox 15 mL. Reassessment pt denies pain.     Telemetry reads Normal Sinus Rhythm.       Problem: Chest Pain  Goal: Resolution of Chest Pain Symptoms  Outcome: Progressing  Intervention: Manage Acute Chest Pain  Recent Flowsheet Documentation  Taken 4/13/2024 1947 by Zahraa Forman RN  Chest Pain Intervention: (lidocaine patch, MD informed/aware) other (see comments)  Taken 4/13/2024 1942 by Zahraa Forman, RN  Chest Pain Intervention: (txtpg house)   nitroglycerin SL given   other (see comments)

## 2024-04-14 NOTE — PLAN OF CARE
Goal Outcome Evaluation:       Pt denied any pain overnight. Vitals stable, still requiring oxygen at 2LNC to maintain oxygen saturation >90% when sleeping.

## 2024-04-14 NOTE — PLAN OF CARE
Wife here overnight, left after breakfast, son here starting at lunch time. Family cares for peter at home. Oxygen in use this am as he has low oxygen levels when sleeping. encourage IS and deep breathing.  Walked in malave with assist this am and sat up in chair. Shortly after told the doctor he was feeling shaky. See orders. Oxygen applied at 2 liters.  States he felt better after the albumin. Assist of 1 back to bed. Denies feeling shaky, no pain. Ate part of his lunch. Son states he will be here this afternoon. Sedrick hopes to be discharged tomorrow.  Wife will be staying with sedrick lange. Falls and pressure ulcer prevention plans in place.Tamara Lebron RN    Problem: Heart Failure  Goal: Effective Oxygenation and Ventilation  Intervention: Promote Airway Secretion Clearance  Recent Flowsheet Documentation  Taken 4/14/2024 0800 by Tamara Lebron RN  Cough And Deep Breathing: done with encouragement  Activity Management: activity adjusted per tolerance       Problem: Heart Failure  Goal: Optimal Functional Ability  Intervention: Optimize Functional Ability  Recent Flowsheet Documentation  Taken 4/14/2024 0800 by Tamara Lebron RN  Activity Management: activity adjusted per tolerance

## 2024-04-14 NOTE — PLAN OF CARE
Patient 02 dropping to 87 % on RA went to assess patient who now states his chest pain 5/10 worse than this afternoon. He says he is short of breath but breathing is unlabored. Patient did just finish dinner but denies that it is heart burn and said it was getting worse before he ate. Nitroglycerin given

## 2024-04-14 NOTE — PROGRESS NOTES
Impression and Plan     Impression:   Non-ST elevation myocardial infarction. Suspect this is demand-mediated (type II) from uncontrolled hypertension and decompensated congestive heart failure. Coronary angiography 4/12/2024 showed no progression of coronary artery disease with stable severe stenosis of the distal left anterior descending artery felt to be best managed medically. He has intermittent chest pain although I am not sure if this is anginal or from another etiology such as gastroesophageal reflux.  Acute on chronic congestive heart failure with preserved left ventricular ejection fraction. Uncontrolled hypertension is probably the main  of this. His aortic regurgitation is not obviously severe yet with normal left ventricular cavity size and systolic function thus far. He seems euvolemic.  Moderate-to-severe eccentric aortic regurgitation which may be due to prolapse of the right coronary cusp. Probably not the cause of his symptoms as discussed above.  Coronary artery disease with severe stenosis of the distal left anterior descending artery and otherwise nonobstructive disease seen on coronary angiography 4/12/2023  History of hemorrhagic stroke 3/15/2017 due to spontaneous right basal ganglia bleed. This was thought to be due to uncontrolled hypertension.  Moderate left internal carotid artery stenosis last assessed on carotid ultrasound 5/27/2020.  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 This is felt to be a chronic finding and he has no signs or symptoms of mesenteric ischemia.  Infrarenal abdominal aortic aneurysm measuring 3.9 cm noted on CT 4/11/2024.  Syncopal episode 5/5/2020 felt to be due to vasovagal syncope with evidence of this on follow-up ambulatory cardiac monitoring 5/28/2022.  Essential hypertension. Controlled now.  Hyperlipidemia. Last LDL 61 mg/dL.  Possible obstructive sleep apnea.  Obesity with body mass index 38.27  "kg/m .    Plan:  Transition to oral bumetanide 1 mg daily  Start isosorbide mononitrate 30 mg daily  Discontinue hydrochlorothiazide as he will discharge home on an oral loop diuretic  Spironolactone 25 mg daily  Hold on starting beta blocker therapy with his prior history of vagally-mediated bradyarrhythmias  Rosuvastatin 20 mg daily  He should have an outpatient sleep medicine consultation  He can follow-up with his primary cardiologist at [facility] to determine if further evaluation for his aortic regurgitation is needed  If he feels well when ambulating, he can discharge home later today otherwise we can observe overnight and reassess for discharge in the morning.    Primary Cardiologist:     Subjective     - had chest pain last night, improved after SL NTG and GI cocktail  - hypoxic when sleeping    Cardiac Diagnostics   Telemetry (personally reviewed): SR, no arrhythmias     ECG 4/12/2024 (personally reviewed and interpreted): SR, poor anterior R wave progression     Cardiac event monitor 5/8/2020 (report reviewed):  Results:    Indication for study: Syncope and collapse    The monitor was scheduled for 30 days and worn for 22 days.  There was 20 days 5 hours of interpretable data.  Compliance with the monitor was fair.    The baseline rhythm transmission demonstrated normal sinus rhythm with heart rates in the 80s.  The NH interval, QRS duration and QT intervals all appear to be normal.    The patient had 2 manually activated rhythm recordings.  Symptoms were reported as \"other\".  The patient's rhythm demonstrated sinus rhythm with heart rates in the 70s.  There was no pathologic rhythm disturbance demonstrated.      The patient had 25 auto triggered recordings.  Symptoms were not reported.  The patient's rhythm demonstrated for the most part the patient's rhythm was sinus with heart rates ranging between 60 and 90 bpm.  There are rare isolated PAC and PVC recorded.  The patient had one episode of " slightly wide QRS tachycardia lasting 5 beats which I suspect is an episode of ectopic atrial tachycardia with a rate of 180 bpm and very mild QRS widening.  The patient also had an episode of bradycardia with both sinus bradycardia and AV block with junctional escape (rates in the 29 bpm range) lasting roughly 8 seconds.  This occurred during the waking hours, but was not associated with apparent symptoms.     Impression:    Abnormal multi-day patient activated monitor with an episode of bradycardia lasting approximately 8 seconds.  The episode demonstrated features consistent with increased vagal tone with both sinus bradycardia and loss of AV conduction with ventricular rates in the high 20s.  Further clinical correlation is warranted as this could be a potential cause for the patient's underlying symptoms of syncope and collapse.    No sustained atrial or ventricular tachyarrhythmia.     Echocardiogram 4/11/2024 (report reviewed):  The left ventricle is normal in size.  There is mild concentric left ventricular hypertrophy.  The visual ejection fraction is 55-60%.  No regional wall motion abnormalities noted.  The right ventricle is normal size.  The right ventricular systolic function is normal.  The mitral valve leaflets are mildly thickened.  The mitral leaflets appear thickened, hooded, and/or redundant, consistent with myxomatous degeneration.  Redundant elongated chordae are noted.  There is mild to moderate (1-2+) mitral regurgitation.  Cannot see the individual leaflets of the AV on this study.  Trivial pericardial effusion  Sinus rhythm was noted.  There is no comparison study available.     Echocardiogram 5/9/2023 (results reviewed):    1. Calculated left ventricular ejection fraction (modified Porter technique) is 62 %.    2. No regional wall motion abnormalities.    3. Normal right ventricular chamber size. Normal right ventricular systolic function.    4. Moderate, eccentric, posteriorly directed  "aortic valve regurgitation.      Carotid ultrasound 5/27/2020 (report reviewed):  1.  Mild plaque formation, velocities consistent with less than 50% stenosis in the right internal carotid artery.  2.  Moderate plaque formation, velocities consistent with 50-69% stenosis in the left internal carotid artery.  3.  Flow within the vertebral arteries is antegrade. However the left vertebral artery waveform severely blunted which is suggestive of significant proximal disease in the vessel.     Cardiac cath 4/12/2024 (report reviewed):  1.  Left main large, no stenoses.  2.  LAD has diffuse lumen irregularity proximal to mid segment; moderate stenoses mid segment and 70 to 80% segment apical LAD.    3.  Left circumflex system appears normal.  4.  Huge aneurysmal right coronary artery with diffuse luminal irregularity.  Mild plaquing proximal PDA and apical PDA segment.  No apparent flow-limiting stenoses.  5.  No apparent change since angiogram in January at [facility].  No  \"unstable plaque\".  Probable demand ischemia due to fixed coronary disease and severe hypertension.  Patient suboptimal candidate for stenting due to history of CNS bleed and anticipated requirement of dual antiplatelet therapy post stent.  Could consider stand-alone PTCA of apical LAD segment if symptoms persist or recur.     Cardiac Cath 5/9/2023 (results reviewed):   DIAGNOSTIC - CORONARY   * Right dominant coronary artery system   * The left main artery has minimal disease.   * 50% stenosis in the mid LAD. Diffuse narrowing noted.   * 75% stenosis in the distal LAD   * 50% stenosis in the distal Circumflex   * The RCA is large in size.   * The RCA has mild disease.   * 50% stenosis in the proximal RAMIRO   * 30% stenosis in the proximal PDA   HEMODYNAMICS   * The LVEDP is moderately elevated at 21 mmHg      Cardiac Stress Testing 5/31/2017 (results reviewed):    No previous study available for comparison.    Lexiscan stress ECG is negative for " "inducible myocardial ischemia.    Lexiscan stress nuclear study is negative for inducible myocardial ischemia or infarction.    Normal left ventricular size, wall motion and function. The calculated left ventricular ejection fraction is 66%.    Physical Examination       /58 (BP Location: Left arm)   Pulse 80   Temp 97.5  F (36.4  C) (Oral)   Resp 20   Ht 1.6 m (5' 3\")   Wt 80.9 kg (178 lb 6.4 oz)   SpO2 90%   BMI 31.60 kg/m          Wt Readings from Last 3 Encounters:   04/14/24 80.9 kg (178 lb 6.4 oz)   09/02/22 75.6 kg (166 lb 10.7 oz)   03/31/17 89.4 kg (197 lb)             Intake/Output Summary (Last 24 hours) at 4/14/2024 0926  Last data filed at 4/14/2024 0801  Gross per 24 hour   Intake 890 ml   Output 1825 ml   Net -935 ml       General: pleasant male. Appears uncomfortable.  HENT: external ears normal. Nares patent. Mucous membranes moist.  Eyes: perrla, extraocular muscles intact. No scleral icterus.   Neck: No JVD  Lungs: clear to auscultation  COR: regular rate and rhythm, No murmurs, rubs, or gallops  Abd: nondistended, BS present  Extrem: No edema         Imaging      CTA chest 4/11/2024 (report reviewed):  1.  No abnormalities are seen to explain chest pain.  2.  Specifically, no evidence of pulmonary emboli, aortic aneurysm or dissection.  3.  Patient has developed an ulceration of the proximal SMA plaque which can be seen on the study done in 2020. The SMA proximally is ectatic and measures 1.2 cm, unchanged.  4.  Incidental 2 right renal arteries.     CT abdomen/pelvis 4/11/2024 (report reviewed):  1.  The pancreas is unremarkable. No findings for acute pancreatitis.  2.  Mild fatty infiltration of the liver.  3.  Cholecystectomy.  4.  Advanced atherosclerotic disease with 3.9 x 3.1 cm infrarenal abdominal aortic aneurysm which has slightly increased in size.  5.  Small cysts both kidneys.  6.  Mild prostatic hypertrophy.    Lab Results   Lab Results   Component Value Date     " 04/14/2024    CO2 32 04/14/2024    CO2 25 05/06/2020    BUN 43.2 04/14/2024    BUN 15 05/06/2020     Lab Results   Component Value Date    WBC 9.1 04/12/2024    HGB 16.0 04/12/2024    HCT 50.1 04/12/2024    MCV 94 04/12/2024     04/12/2024     Lab Results   Component Value Date    CHOL 210 04/11/2024    TRIG 343 04/11/2024    HDL 80 04/11/2024     Lab Results   Component Value Date    INR 0.99 04/11/2024     Lab Results   Component Value Date    TROPONINI 0.03 05/06/2020    TROPONINI 0.03 05/05/2020     Lab Results   Component Value Date    TSH 1.06 05/05/2020           Current Inpatient Scheduled Medications   Scheduled Meds:  Current Facility-Administered Medications   Medication Dose Route Frequency Provider Last Rate Last Admin    allopurinol (ZYLOPRIM) tablet 300 mg  300 mg Oral BID Krish Linn DO   300 mg at 04/14/24 0911    amLODIPine (NORVASC) tablet 10 mg  10 mg Oral Daily Krish Linn DO   10 mg at 04/14/24 0911    aspirin EC tablet 81 mg  81 mg Oral Daily Krish Linn DO   81 mg at 04/14/24 0912    bumetanide (BUMEX) tablet 1 mg  1 mg Oral Daily Carlos Trujillo MD   1 mg at 04/14/24 0915    isosorbide mononitrate (IMDUR) 24 hr tablet 30 mg  30 mg Oral Daily Carlos Trujillo MD        Lidocaine (LIDOCARE) 4 % Patch 1 patch  1 patch Transdermal Q24h Lydia Dorado DO   1 patch at 04/13/24 2015    losartan (COZAAR) tablet 100 mg  100 mg Oral Daily Carlos Trujillo MD   100 mg at 04/14/24 0911    rosuvastatin (CRESTOR) tablet 20 mg  20 mg Oral At Bedtime Krish Linn DO   20 mg at 04/13/24 2015    spironolactone (ALDACTONE) tablet 25 mg  25 mg Oral Daily Carlos Trujillo MD   25 mg at 04/14/24 0911    sucralfate (CARAFATE) tablet 1 g  1 g Oral TID AC Krish Linn DO   1 g at 04/14/24 0759            Medications Prior to Admission   Prior to Admission medications    Medication Sig Start Date End Date Taking? Authorizing Provider   acetaminophen (TYLENOL) 650 MG CR tablet Take  650 mg by mouth every 8 hours as needed   Yes Reported, Patient   allopurinol (ZYLOPRIM) 300 MG tablet Take 300 mg by mouth 2 times daily   Yes Reported, Patient   amLODIPine (NORVASC) 10 MG tablet Take 10 mg by mouth daily 1/8/24  Yes Reported, Patient   aspirin 81 MG EC tablet Take 81 mg by mouth daily 1/8/24  Yes Reported, Patient   CHOLECALCIFEROL 25 MCG (1000 UT) tablet Take 25 mcg by mouth daily 2/1/24  Yes Reported, Patient   diclofenac (VOLTAREN) 1 % topical gel Apply 4 g topically 4 times daily as needed   Yes Reported, Patient   losartan (COZAAR) 100 MG tablet Take 100 mg by mouth daily 2/1/24  Yes Reported, Patient   rosuvastatin (CRESTOR) 20 MG tablet Take 20 mg by mouth at bedtime 5/9/23  Yes Reported, Patient   traZODone (DESYREL) 150 MG tablet Take 150 mg by mouth nightly as needed   Yes Reported, Patient   hydrochlorothiazide (HYDRODIURIL) 25 MG tablet Take 50 mg by mouth daily    Reported, Patient          Carlos Trujillo MD [facility]  Non-Invasive Cardiologist  [facility]  Pager [phone]

## 2024-04-14 NOTE — SIGNIFICANT EVENT
Significant Event Note    Time of event: 8:41 PM April 13, 2024    Description of event:  Called to pt bedside by RN to evaluate pt c/o chest pain, rates 5/10. States not sob when I examined him. Denies radiation of chest pain, states feels similar to indigestion however concerned of poss. ACS given patient is admit for NSTEMI from uncontrolled HTN and decompensated congestive heart failure, however angiogram 4/12 demonstrated no progression of CAD. States pain significantly improved s/p NTG    Exam:  Gen: pt comfortable, in no acute distress  CV: RRR, S1 and S2, CP reproducible on palpation  Resp: Lungs CTA BL  Ext: 1+ Periph edema    Plan:  Suspect poss GI etiology - trial GI cocktail  Continue NTG as needed for pain control  EKG  Troponins - trend    Discussed with: bedside nurse    Benjamin Yee MD

## 2024-04-14 NOTE — PROGRESS NOTES
[facility]    Medicine Progress Note - Hospitalist Service    Date of Admission:  4/11/2024    Assessment & Plan   73 year old male with history of prior CVA, CAD, diastolic CHF, aortic valve disease, gout, admitted on 4/11/2024 with NSTEMI     NSTEMI:  Concern for acute on chronic diastolic CHF exacerbation  History of CAD, aortic regurgitation, mitral valve disease, HFpEF  Presents with chest and lower abdominal pain and found to have elevated troponin with T wave inversions on EKG. BNP elevated.   CT chest negative for PE or dissection  There was incidental finding of ulceration of the proximal SMA plaque at was seen on prior study done in 2020.  There is a incidental finding of advanced atherosclerotic disease with 3.9 x 3.1 cm infrarenal abdominal aortic aneurysm which has slightly increased in size.   Review of prior coronary angiogram notable for LAD and distal circ disease  TTE shows preserved EF with no concerning WMA however, mitral leaflets appear thickened, hooded, and/or redundant, consistent  with myxomatous degeneration, mild to moderate mitral regurgitation, and mod-severe to severe (3-4+) aortic regurgitation.   Coronary angiogram 4/12 showed stable CAD and intervention not performed. Elevated LVEDP indicative of CHF   -- continue diuresis per cardiology  -- continue home ASA and statin  -- continue home amlodipine, spironolactone as BP's allow  -- holding home losartan  -- plan referral to valve clinic given TTE findings of aortic and mitral valve disease  -- started on imdur 4/14 for chest pain overnight        Lightheadedness, BRAVO:  Patient with acute onset lightheadedness, blurry vision, dyspnea after ambulating 4/14  Found to have relative hypotension, question if having side effects from imdur  -- CXR  -- IV albumin bolus  -- hold losartan and place hold parameters on imdur  -- PT/OT      PANKAJ:   Suspect secondary to diuresis  -- hold losartan  -- stop IV loop  "diuresis  -- give albumin bolus  -- recheck in AM      Overnight hypoxia: likely related to untreated VICTORINA  -- check CXR  -- Push IS  -- outpatient sleep study referral after discharge          Upper abdominal pain  Lipase mildly elevated but CT abdomen negative for pancreatitis.  CT does show ulceration of the proximal SMA plaque that was seen on prior study done in 2020.  There is a incidental finding of advanced atherosclerotic disease with 3.9 x 3.1 cm infrarenal abdominal aortic aneurysm which has slightly increased in size.   -- lactic acid normal  -- supportive cares  -- was then complaining of more chest pain that is worse with palpation so started on lidocaine patch 4/13  -- complaining of ongoing chest pain overnight 4/13-4/14 better with NTG and GI cocktail, started on carafate 4/14, also started on imdur 4/14          Prior hemorrhagic stroke:   -- continue home asa and statin        H/O gout: continue home allopurinol           Diet: Low Saturated Fat Na <2400 mg    DVT Prophylaxis: SCD  Eugene Catheter: Not present  Lines: None     Cardiac Monitoring: None  Code Status: Full Code      Clinically Significant Risk Factors                  # Hypertension: Noted on problem list        # Obesity: Estimated body mass index is 31.6 kg/m  as calculated from the following:    Height as of this encounter: 1.6 m (5' 3\").    Weight as of this encounter: 80.9 kg (178 lb 6.4 oz)., PRESENT ON ADMISSION            Disposition Plan   Medically Ready for Discharge: Anticipated Tomorrow      Krish Linn DO  Hospitalist Service  [facility]  Securely message with [employer] (more info)  Text page via [employer] Paging/Directory   ______________________________________________________________________    Interval History   NAD. Complaining of lightheadedness, blurry vision, dyspnea after ambulating     Physical Exam   Vital Signs: Temp: 97.5  F (36.4  C) Temp src: Oral BP: 107/55 Pulse: 91   Resp: 20 " SpO2: 93 % O2 Device: None (Room air) Oxygen Delivery: 2 LPM  Weight: 178 lbs 6.4 oz  General: NAD  RESPIRATORY: Breathing slighlty labored  CARDIOVASCULAR: No le edema bilat.    NEUROLOGIC: Motor and sensory intact, speech clear, alert         >50 MINUTES SPENT BY ME on the date of service doing chart review, history, exam, documentation & further activities per the note.  Data

## 2024-04-15 ENCOUNTER — TELEPHONE (OUTPATIENT)
Dept: CARDIOLOGY | Facility: CLINIC | Age: 74
End: 2024-04-15

## 2024-04-15 ENCOUNTER — APPOINTMENT (OUTPATIENT)
Dept: OCCUPATIONAL THERAPY | Facility: HOSPITAL | Age: 74
DRG: 280 | End: 2024-04-15
Attending: INTERNAL MEDICINE
Payer: COMMERCIAL

## 2024-04-15 VITALS
HEART RATE: 82 BPM | DIASTOLIC BLOOD PRESSURE: 56 MMHG | SYSTOLIC BLOOD PRESSURE: 118 MMHG | RESPIRATION RATE: 20 BRPM | TEMPERATURE: 98 F | HEIGHT: 63 IN | WEIGHT: 175.6 LBS | OXYGEN SATURATION: 93 % | BODY MASS INDEX: 31.11 KG/M2

## 2024-04-15 DIAGNOSIS — I50.9 ACUTE DECOMPENSATED HEART FAILURE (H): Primary | ICD-10-CM

## 2024-04-15 LAB
ANION GAP SERPL CALCULATED.3IONS-SCNC: 10 MMOL/L (ref 7–15)
BUN SERPL-MCNC: 41 MG/DL (ref 8–23)
CALCIUM SERPL-MCNC: 9.2 MG/DL (ref 8.8–10.2)
CHLORIDE SERPL-SCNC: 99 MMOL/L (ref 98–107)
CREAT SERPL-MCNC: 1.24 MG/DL (ref 0.67–1.17)
DEPRECATED HCO3 PLAS-SCNC: 30 MMOL/L (ref 22–29)
EGFRCR SERPLBLD CKD-EPI 2021: 61 ML/MIN/1.73M2
GLUCOSE SERPL-MCNC: 142 MG/DL (ref 70–99)
POTASSIUM SERPL-SCNC: 4.2 MMOL/L (ref 3.4–5.3)
SODIUM SERPL-SCNC: 139 MMOL/L (ref 135–145)

## 2024-04-15 PROCEDURE — 97165 OT EVAL LOW COMPLEX 30 MIN: CPT | Mod: GO

## 2024-04-15 PROCEDURE — 97110 THERAPEUTIC EXERCISES: CPT | Mod: GO

## 2024-04-15 PROCEDURE — 97535 SELF CARE MNGMENT TRAINING: CPT | Mod: GO

## 2024-04-15 PROCEDURE — 80048 BASIC METABOLIC PNL TOTAL CA: CPT | Performed by: INTERNAL MEDICINE

## 2024-04-15 PROCEDURE — 99207 PR NO CHARGE LOS: CPT | Performed by: INTERNAL MEDICINE

## 2024-04-15 PROCEDURE — 250N000013 HC RX MED GY IP 250 OP 250 PS 637: Performed by: INTERNAL MEDICINE

## 2024-04-15 PROCEDURE — 99239 HOSP IP/OBS DSCHRG MGMT >30: CPT | Performed by: INTERNAL MEDICINE

## 2024-04-15 PROCEDURE — 36415 COLL VENOUS BLD VENIPUNCTURE: CPT | Performed by: INTERNAL MEDICINE

## 2024-04-15 PROCEDURE — 250N000013 HC RX MED GY IP 250 OP 250 PS 637: Performed by: GENERAL ACUTE CARE HOSPITAL

## 2024-04-15 RX ORDER — BUMETANIDE 1 MG/1
1 TABLET ORAL DAILY
Qty: 30 TABLET | Refills: 1 | Status: SHIPPED | OUTPATIENT
Start: 2024-04-16

## 2024-04-15 RX ORDER — SPIRONOLACTONE 25 MG/1
25 TABLET ORAL DAILY
Qty: 30 TABLET | Refills: 1 | Status: SHIPPED | OUTPATIENT
Start: 2024-04-16

## 2024-04-15 RX ORDER — PANTOPRAZOLE SODIUM 40 MG/1
40 TABLET, DELAYED RELEASE ORAL
Status: DISCONTINUED | OUTPATIENT
Start: 2024-04-15 | End: 2024-04-15 | Stop reason: HOSPADM

## 2024-04-15 RX ORDER — PANTOPRAZOLE SODIUM 40 MG/1
40 TABLET, DELAYED RELEASE ORAL
Qty: 30 TABLET | Refills: 1 | Status: ON HOLD | OUTPATIENT
Start: 2024-04-16 | End: 2024-04-29

## 2024-04-15 RX ORDER — PANTOPRAZOLE SODIUM 40 MG/1
40 TABLET, DELAYED RELEASE ORAL
Qty: 30 TABLET | Refills: 1 | Status: CANCELLED | OUTPATIENT
Start: 2024-04-15

## 2024-04-15 RX ORDER — IODIXANOL 320 MG/ML
INJECTION, SOLUTION INTRAVASCULAR
Status: SHIPPED | OUTPATIENT
Start: 2024-04-12

## 2024-04-15 RX ADMIN — ASPIRIN 81 MG: 81 TABLET, COATED ORAL at 08:13

## 2024-04-15 RX ADMIN — PANTOPRAZOLE SODIUM 40 MG: 40 TABLET, DELAYED RELEASE ORAL at 09:37

## 2024-04-15 RX ADMIN — ALLOPURINOL 300 MG: 300 TABLET ORAL at 08:13

## 2024-04-15 RX ADMIN — SUCRALFATE 1 G: 1 TABLET ORAL at 06:33

## 2024-04-15 RX ADMIN — SPIRONOLACTONE 25 MG: 25 TABLET, FILM COATED ORAL at 08:13

## 2024-04-15 RX ADMIN — BUMETANIDE 1 MG: 1 TABLET ORAL at 08:13

## 2024-04-15 ASSESSMENT — ACTIVITIES OF DAILY LIVING (ADL)
ADLS_ACUITY_SCORE: 36
ADLS_ACUITY_SCORE: 35
ADLS_ACUITY_SCORE: 36

## 2024-04-15 NOTE — PLAN OF CARE
"  Problem: Adult Inpatient Plan of Care  Goal: Plan of Care Review  Description: The Plan of Care Review/Shift note should be completed every shift.  The Outcome Evaluation is a brief statement about your assessment that the patient is improving, declining, or no change.  This information will be displayed automatically on your shift  note.  Outcome: Adequate for Care Transition  Goal: Patient-Specific Goal (Individualized)  Description: You can add care plan individualizations to a care plan. Examples of Individualization might be:  \"Parent requests to be called daily at 9am for status\", \"I have a hard time hearing out of my right ear\", or \"Do not touch me to wake me up as it startles  me\".  Outcome: Adequate for Care Transition  Goal: Absence of Hospital-Acquired Illness or Injury  Intervention: Identify and Manage Fall Risk  Recent Flowsheet Documentation  Taken 4/15/2024 0900 by Lisa Chase RN  Safety Promotion/Fall Prevention: activity supervised  Intervention: Prevent Skin Injury  Recent Flowsheet Documentation  Taken 4/15/2024 0900 by Lsia Chase RN  Body Position: position changed independently  Intervention: Prevent Infection  Recent Flowsheet Documentation  Taken 4/15/2024 0900 by Lisa Chase RN  Infection Prevention: single patient room provided  Goal: Readiness for Transition of Care  Outcome: Adequate for Care Transition     Problem: Risk for Delirium  Goal: Improved Behavioral Control  Intervention: Minimize Safety Risk  Recent Flowsheet Documentation  Taken 4/15/2024 0900 by Lisa Chase RN  Enhanced Safety Measures: pain management     Problem: Skin Injury Risk Increased  Goal: Skin Health and Integrity  Intervention: Plan: Nurse Driven Intervention: Moisture Management  Recent Flowsheet Documentation  Taken 4/15/2024 0900 by Lisa Chase RN  Plan: Moisture Management: encourage regular toileting  Intervention: Optimize Skin Protection  Recent Flowsheet Documentation  Taken " 4/15/2024 0900 by Lisa Chase RN  Activity Management: activity adjusted per tolerance     Problem: Chest Pain  Goal: Resolution of Chest Pain Symptoms  Outcome: Adequate for Care Transition     Problem: Heart Failure  Goal: Optimal Coping  Outcome: Adequate for Care Transition  Goal: Optimal Cardiac Output  Outcome: Adequate for Care Transition  Goal: Stable Heart Rate and Rhythm  Outcome: Adequate for Care Transition  Goal: Optimal Functional Ability  Outcome: Adequate for Care Transition  Intervention: Optimize Functional Ability  Recent Flowsheet Documentation  Taken 4/15/2024 0900 by Lisa Chase RN  Activity Management: activity adjusted per tolerance  Goal: Fluid and Electrolyte Balance  Outcome: Adequate for Care Transition  Goal: Improved Oral Intake  Outcome: Adequate for Care Transition  Goal: Effective Oxygenation and Ventilation  Outcome: Adequate for Care Transition  Intervention: Promote Airway Secretion Clearance  Recent Flowsheet Documentation  Taken 4/15/2024 0900 by Lisa Chase RN  Activity Management: activity adjusted per tolerance  Goal: Effective Breathing Pattern During Sleep  Outcome: Adequate for Care Transition  Intervention: Monitor and Manage Obstructive Sleep Apnea  Recent Flowsheet Documentation  Taken 4/15/2024 0900 by Lisa Chase RN  Medication Review/Management: medications reviewed   Goal Outcome Evaluation:    Therapy walked pt in hallway on RA oxygen sats 95%.  Doctor said to not do the oxygen assessment and pt is ok to discharge on RA.  Went over discharge instructions with pt and family.  Answered all questions.

## 2024-04-15 NOTE — PROGRESS NOTES
Heart Failure Care Map  GOALS TO BE MET BEFORE DISCHARGE:    1. Decrease congestion and/or edema with diuretic therapy to achieve near optimal volume status.     Dyspnea improved: No, further care required to meet this goal. Please explain SOB with activity   Edema improved: Yes, satisfactory for discharge.        Last 24 hour I/O:   Intake/Output Summary (Last 24 hours) at 4/15/2024 0508  Last data filed at 4/15/2024 0507  Gross per 24 hour   Intake 750 ml   Output 1275 ml   Net -525 ml           Net I/O and Weights since admission:   03/16 0700 - 04/15 0659  In: 2046.83 [P.O.:2010; I.V.:36.83]  Out: 6415 [Urine:6415]  Net: -4368.17     Vitals:    04/11/24 1203 04/12/24 0425 04/13/24 0608 04/14/24 0257   Weight: 85.3 kg (188 lb) 98 kg (216 lb 0.8 oz) 81.4 kg (179 lb 8 oz) 80.9 kg (178 lb 6.4 oz)    04/15/24 0308   Weight: 79.7 kg (175 lb 9.6 oz)       2.  O2 sats > 90% on room air, or at prior home O2 therapy level.      Able to wean O2 this shift to keep sats above 90%?: No, further care required to meet this goal. Please explain desat's to 87% on room air.   Does patient use Home O2? No          Current oxygenation status:   SpO2: 91 %     O2 Device: Nasal cannula, Oxygen Delivery: 2 LPM    3.  Tolerates ambulation and mobility near baseline.     Ambulation: Yes, satisfactory for discharge.   Times patient ambulated with staff this shift: 1    Please review the Heart Failure Care Map for additional HF goal outcomes.  Patient has denied pain all shift, uses urinal at bedside and bed alarm on for safety.  Abigail Davis RN  4/15/2024

## 2024-04-15 NOTE — PLAN OF CARE
Occupational Therapy Discharge Summary    Reason for therapy discharge:    All goals and outcomes met, no further needs identified.    Progress towards therapy goal(s). See goals on Care Plan in [facility] electronic health record for goal details.  Goals met    Therapy recommendation(s):    Discharging home w/ family. Continue to monitor & provide CHF education.

## 2024-04-15 NOTE — DISCHARGE SUMMARY
[facility]  Hospitalist Discharge Summary      Date of Admission:  4/11/2024  Date of Discharge:  4/15/2024  Discharging Provider: Krish Linn,   Discharge Service: Hospitalist Service        Follow-ups Needed After Discharge   Follow-up Appointments     Follow-up and recommended labs and tests       Follow up with primary care provider, SHASHANK DUOGLAS, within 3-5 days, to   evaluate medication change and for hospital follow- up. The following   labs/tests are recommended: BMP.  Follow-up with your primary cardiologist at [facility] to determine if   further evaluation for his aortic regurgitation is needed            Unresulted Labs Ordered in the Past 30 Days of this Admission       No orders found from 3/12/2024 to 4/12/2024.        These results will be followed up by Hospitalist results pool    Discharge Disposition   Discharged to home  Condition at discharge: Stable      Hospital Course   73 year old male with history of prior CVA, CAD, diastolic CHF, aortic valve disease, gout, admitted on 4/11/2024 with NSTEMI     NSTEMI:  Concern for acute on chronic diastolic CHF exacerbation  History of CAD, aortic regurgitation, mitral valve disease, HFpEF  Presents with chest and lower abdominal pain and found to have elevated troponin with T wave inversions on EKG. BNP elevated.   CT chest negative for PE or dissection  There was incidental finding of ulceration of the proximal SMA plaque at was seen on prior study done in 2020.  There is a incidental finding of advanced atherosclerotic disease with 3.9 x 3.1 cm infrarenal abdominal aortic aneurysm which has slightly increased in size.   Review of prior coronary angiogram notable for LAD and distal circ disease  TTE shows preserved EF with no concerning WMA however, mitral leaflets appear thickened, hooded, and/or redundant, consistent  with myxomatous degeneration, mild to moderate mitral regurgitation, and mod-severe to severe  (3-4+) aortic regurgitation.   Coronary angiogram 4/12 showed stable CAD and intervention not performed. Elevated LVEDP indicative of CHF   -- continue bumex 1 mg daily and spironolactone 25mg dialy after discharge per cardiology. Stop home HCTZ.  -- continue home ASA and statin  -- continue home amlodipine  -- holding home losartan given recent hypotension and delayed renal recovery   -- follow-up with his primary cardiologist at [facility] to determine if further evaluation for his aortic regurgitation is needed         Lightheadedness, BRAVO:  Patient with acute onset lightheadedness, blurry vision, dyspnea after ambulating 4/14  Found to have relative hypotension, likely having side effects from imdur  -- holding further imdur   -- hold losartan as above        PANKAJ:   Suspect secondary to diuresis  PANKAJ improving but not resolved, Creatinine improved from 1.4 to 1.24 on day of discharge  -- hold losartan after discharge  -- close outpatient follow up of repeat GFR and BP and can resume losartan when appropriate         Overnight hypoxia: likely related to untreated VICTORINA  -- does not qualify for home oxygen  -- outpatient sleep study referral after discharge           Upper abdominal pain  Lipase mildly elevated but CT abdomen negative for pancreatitis.  CT does show ulceration of the proximal SMA plaque that was seen on prior study done in 2020.  There is a incidental finding of advanced atherosclerotic disease with 3.9 x 3.1 cm infrarenal abdominal aortic aneurysm which has slightly increased in size.   -- lactic acid normal  -- supportive cares  -- was then complaining of more chest pain that is worse with palpation so started on lidocaine patch 4/13  -- complaining of ongoing chest pain overnight 4/13-4/14 better with NTG and GI cocktail, started on imdur 4/14  -- imdur held due to hypotension  -- will start daily PPI after discharge          Prior hemorrhagic stroke:   -- continue home asa and statin         H/O gout: continue home allopurinol         Consultations This Hospital Stay   PHARMACY IP CONSULT  CARDIOLOGY IP CONSULT  CARE MANAGEMENT / SOCIAL WORK IP CONSULT  PHARMACY IP CONSULT  PHYSICAL THERAPY ADULT IP CONSULT  OCCUPATIONAL THERAPY ADULT IP CONSULT    Code Status   Full Code    Time Spent on this Encounter   I, Krish Linn DO, personally saw the patient today and spent greater than 30 minutes discharging this patient.       Krish Linn DO  [facility]  [address] [place] [id number]  Phone: [phone]  Fax: [phone]  ______________________________________________________________________    Physical Exam   Vital Signs: Temp: 98  F (36.7  C) Temp src: Oral BP: 118/56 Pulse: 82   Resp: 20 SpO2: 93 % O2 Device: Nasal cannula Oxygen Delivery: 2 LPM  Weight: 175 lbs 9.6 oz    General: NAD  RESPIRATORY: Respirations nonlabored  CARDIOVASCULAR: No le edema bilat.  ABDOMEN: soft, non-tender   NEUROLOGIC: No focal arm or leg weakness, speech is clear    Primary Care Physician   SHASHANK DOUGLAS    Discharge Orders      Adult Sleep Eval & Management  Referral      Reason for your hospital stay    Chest pain     Follow-up and recommended labs and tests     Follow up with primary care provider, SHASHANK DOUGLAS, within 3-5 days, to evaluate medication change and for hospital follow- up. The following labs/tests are recommended: BMP.  Follow-up with your primary cardiologist at [facility] to determine if further evaluation for his aortic regurgitation is needed     Activity    Your activity upon discharge: activity as tolerated     Diet    Follow this diet upon discharge: Orders Placed This Encounter      Low Saturated Fat Na <2400 mg     \    Discharge Medications   Current Discharge Medication List        START taking these medications    Details   bumetanide (BUMEX) 1 MG tablet Take 1 tablet (1 mg) by mouth daily  Qty: 30 tablet, Refills: 1     Associated Diagnoses: Acute diastolic heart failure (H)      pantoprazole (PROTONIX) 40 MG EC tablet Take 1 tablet (40 mg) by mouth every morning (before breakfast)  Qty: 30 tablet, Refills: 1    Associated Diagnoses: Acute chest pain      spironolactone (ALDACTONE) 25 MG tablet Take 1 tablet (25 mg) by mouth daily  Qty: 30 tablet, Refills: 1    Associated Diagnoses: Acute diastolic heart failure (H)           CONTINUE these medications which have NOT CHANGED    Details   acetaminophen (TYLENOL) 650 MG CR tablet Take 650 mg by mouth every 8 hours as needed      allopurinol (ZYLOPRIM) 300 MG tablet Take 300 mg by mouth 2 times daily      amLODIPine (NORVASC) 10 MG tablet Take 10 mg by mouth daily      aspirin 81 MG EC tablet Take 81 mg by mouth daily      CHOLECALCIFEROL 25 MCG (1000 UT) tablet Take 25 mcg by mouth daily      diclofenac (VOLTAREN) 1 % topical gel Apply 4 g topically 4 times daily as needed      rosuvastatin (CRESTOR) 20 MG tablet Take 20 mg by mouth at bedtime      traZODone (DESYREL) 150 MG tablet Take 150 mg by mouth nightly as needed           STOP taking these medications       hydrochlorothiazide (HYDRODIURIL) 25 MG tablet Comments:   Reason for Stopping:         losartan (COZAAR) 100 MG tablet Comments:   Reason for Stopping:             Allergies   Allergies   Allergen Reactions    Dilaudid [Hydromorphone] Unknown    Phenytoin Hives and Itching

## 2024-04-15 NOTE — PROGRESS NOTES
Cardiology team will sign-off for now. Please do not hesitate to consult us again if new questions or concerns arise. Follow-up appointment will be arranged by CORE/HF clinic.

## 2024-04-16 ENCOUNTER — APPOINTMENT (OUTPATIENT)
Dept: INTERPRETER SERVICES | Facility: CLINIC | Age: 74
End: 2024-04-16

## 2024-04-17 ENCOUNTER — PATIENT OUTREACH (OUTPATIENT)
Dept: CARE COORDINATION | Facility: CLINIC | Age: 74
End: 2024-04-17

## 2024-04-17 NOTE — PROGRESS NOTES
[facility]:   [facility] Contact  [facility]/Voicemail     Clinical Data: Post-Discharge Outreach     Outreach attempted x 2.  Left message on patient's voicemail, providing [facility]'s central phone number of [phone] ([phone]) for questions/concerns and/or to schedule an appt with an [facility] provider, if they do not have a PCP.      Plan:  [facility] will do no further outreaches at this time.       Naye English  Community Health Worker  [facility], [facility]  [phone]      *Connected Care Resource Team does NOT follow patient ongoing. Referrals are identified based on internal discharge reports and the outreach is to ensure patient has an understanding of their discharge instructions.

## 2024-04-25 ENCOUNTER — APPOINTMENT (OUTPATIENT)
Dept: CT IMAGING | Facility: HOSPITAL | Age: 74
DRG: 313 | End: 2024-04-25
Attending: FAMILY MEDICINE
Payer: COMMERCIAL

## 2024-04-25 ENCOUNTER — APPOINTMENT (OUTPATIENT)
Dept: RADIOLOGY | Facility: HOSPITAL | Age: 74
DRG: 313 | End: 2024-04-25
Attending: FAMILY MEDICINE
Payer: COMMERCIAL

## 2024-04-25 ENCOUNTER — HOSPITAL ENCOUNTER (INPATIENT)
Facility: HOSPITAL | Age: 74
LOS: 3 days | Discharge: HOME | DRG: 313 | End: 2024-04-29
Attending: FAMILY MEDICINE | Admitting: INTERNAL MEDICINE
Payer: COMMERCIAL

## 2024-04-25 DIAGNOSIS — I25.83 CORONARY ARTERY DISEASE DUE TO LIPID RICH PLAQUE: Chronic | ICD-10-CM

## 2024-04-25 DIAGNOSIS — I25.10 ARTERIOSCLEROTIC CARDIOVASCULAR DISEASE (ASCVD): Chronic | ICD-10-CM

## 2024-04-25 DIAGNOSIS — Z86.73 HISTORY OF CVA (CEREBROVASCULAR ACCIDENT): ICD-10-CM

## 2024-04-25 DIAGNOSIS — I25.10 CORONARY ARTERY DISEASE DUE TO LIPID RICH PLAQUE: Chronic | ICD-10-CM

## 2024-04-25 DIAGNOSIS — R07.9 ACUTE CHEST PAIN: ICD-10-CM

## 2024-04-25 DIAGNOSIS — K21.00 GASTROESOPHAGEAL REFLUX DISEASE WITH ESOPHAGITIS, UNSPECIFIED WHETHER HEMORRHAGE: ICD-10-CM

## 2024-04-25 DIAGNOSIS — R07.9 CHEST PAIN, UNSPECIFIED TYPE: ICD-10-CM

## 2024-04-25 DIAGNOSIS — I50.32 CHRONIC DIASTOLIC HEART FAILURE (H): ICD-10-CM

## 2024-04-25 DIAGNOSIS — R09.02 HYPOXIA: Primary | ICD-10-CM

## 2024-04-25 LAB
ANION GAP SERPL CALCULATED.3IONS-SCNC: 10 MMOL/L (ref 7–15)
BASE EXCESS BLDV CALC-SCNC: 9.9 MMOL/L (ref -3–3)
BASOPHILS # BLD AUTO: 0 10E3/UL (ref 0–0.2)
BASOPHILS NFR BLD AUTO: 0 %
BUN SERPL-MCNC: 22.1 MG/DL (ref 8–23)
CALCIUM SERPL-MCNC: 9.5 MG/DL (ref 8.8–10.2)
CHLORIDE SERPL-SCNC: 97 MMOL/L (ref 98–107)
CREAT SERPL-MCNC: 1.08 MG/DL (ref 0.67–1.17)
DEPRECATED HCO3 PLAS-SCNC: 31 MMOL/L (ref 22–29)
EGFRCR SERPLBLD CKD-EPI 2021: 72 ML/MIN/1.73M2
EOSINOPHIL # BLD AUTO: 0.2 10E3/UL (ref 0–0.7)
EOSINOPHIL NFR BLD AUTO: 2 %
ERYTHROCYTE [DISTWIDTH] IN BLOOD BY AUTOMATED COUNT: 12.7 % (ref 10–15)
FLUAV RNA SPEC QL NAA+PROBE: NEGATIVE
FLUBV RNA RESP QL NAA+PROBE: NEGATIVE
GLUCOSE SERPL-MCNC: 121 MG/DL (ref 70–99)
HCO3 BLDV-SCNC: 35 MMOL/L (ref 21–28)
HCT VFR BLD AUTO: 50.8 % (ref 40–53)
HGB BLD-MCNC: 16.4 G/DL (ref 13.3–17.7)
HOLD SPECIMEN: NORMAL
HOLD SPECIMEN: NORMAL
IMM GRANULOCYTES # BLD: 0.1 10E3/UL
IMM GRANULOCYTES NFR BLD: 1 %
LYMPHOCYTES # BLD AUTO: 1 10E3/UL (ref 0.8–5.3)
LYMPHOCYTES NFR BLD AUTO: 15 %
MAGNESIUM SERPL-MCNC: 1.8 MG/DL (ref 1.7–2.3)
MCH RBC QN AUTO: 30 PG (ref 26.5–33)
MCHC RBC AUTO-ENTMCNC: 32.3 G/DL (ref 31.5–36.5)
MCV RBC AUTO: 93 FL (ref 78–100)
MONOCYTES # BLD AUTO: 0.5 10E3/UL (ref 0–1.3)
MONOCYTES NFR BLD AUTO: 8 %
NEUTROPHILS # BLD AUTO: 4.9 10E3/UL (ref 1.6–8.3)
NEUTROPHILS NFR BLD AUTO: 74 %
NRBC # BLD AUTO: 0 10E3/UL
NRBC BLD AUTO-RTO: 0 /100
NT-PROBNP SERPL-MCNC: 308 PG/ML (ref 0–900)
O2/TOTAL GAS SETTING VFR VENT: 21 %
OXYHGB MFR BLDV: 28 % (ref 70–75)
PCO2 BLDV: 55 MM HG (ref 40–50)
PH BLDV: 7.41 [PH] (ref 7.32–7.43)
PLATELET # BLD AUTO: 179 10E3/UL (ref 150–450)
PO2 BLDV: 19 MM HG (ref 25–47)
POTASSIUM SERPL-SCNC: 3.5 MMOL/L (ref 3.4–5.3)
RBC # BLD AUTO: 5.46 10E6/UL (ref 4.4–5.9)
RSV RNA SPEC NAA+PROBE: NEGATIVE
SAO2 % BLDV: 28.3 % (ref 70–75)
SARS-COV-2 RNA RESP QL NAA+PROBE: NEGATIVE
SODIUM SERPL-SCNC: 138 MMOL/L (ref 135–145)
TROPONIN T SERPL HS-MCNC: 40 NG/L
TROPONIN T SERPL HS-MCNC: 42 NG/L
WBC # BLD AUTO: 6.7 10E3/UL (ref 4–11)

## 2024-04-25 PROCEDURE — 71250 CT THORAX DX C-: CPT

## 2024-04-25 PROCEDURE — 36415 COLL VENOUS BLD VENIPUNCTURE: CPT | Performed by: FAMILY MEDICINE

## 2024-04-25 PROCEDURE — 80048 BASIC METABOLIC PNL TOTAL CA: CPT | Performed by: FAMILY MEDICINE

## 2024-04-25 PROCEDURE — G0378 HOSPITAL OBSERVATION PER HR: HCPCS

## 2024-04-25 PROCEDURE — 99285 EMERGENCY DEPT VISIT HI MDM: CPT | Mod: 25

## 2024-04-25 PROCEDURE — 83036 HEMOGLOBIN GLYCOSYLATED A1C: CPT | Performed by: INTERNAL MEDICINE

## 2024-04-25 PROCEDURE — 83735 ASSAY OF MAGNESIUM: CPT | Performed by: FAMILY MEDICINE

## 2024-04-25 PROCEDURE — 87637 SARSCOV2&INF A&B&RSV AMP PRB: CPT | Performed by: FAMILY MEDICINE

## 2024-04-25 PROCEDURE — 250N000013 HC RX MED GY IP 250 OP 250 PS 637: Performed by: FAMILY MEDICINE

## 2024-04-25 PROCEDURE — 85025 COMPLETE CBC W/AUTO DIFF WBC: CPT | Performed by: FAMILY MEDICINE

## 2024-04-25 PROCEDURE — 83880 ASSAY OF NATRIURETIC PEPTIDE: CPT | Performed by: FAMILY MEDICINE

## 2024-04-25 PROCEDURE — 82805 BLOOD GASES W/O2 SATURATION: CPT | Performed by: FAMILY MEDICINE

## 2024-04-25 PROCEDURE — 84484 ASSAY OF TROPONIN QUANT: CPT | Performed by: FAMILY MEDICINE

## 2024-04-25 PROCEDURE — 93005 ELECTROCARDIOGRAM TRACING: CPT | Performed by: STUDENT IN AN ORGANIZED HEALTH CARE EDUCATION/TRAINING PROGRAM

## 2024-04-25 PROCEDURE — 71045 X-RAY EXAM CHEST 1 VIEW: CPT

## 2024-04-25 RX ORDER — NITROGLYCERIN 0.4 MG/1
0.4 TABLET SUBLINGUAL EVERY 5 MIN PRN
Status: COMPLETED | OUTPATIENT
Start: 2024-04-25 | End: 2024-04-25

## 2024-04-25 RX ADMIN — NITROGLYCERIN 0.4 MG: 0.4 TABLET, ORALLY DISINTEGRATING SUBLINGUAL at 22:58

## 2024-04-25 RX ADMIN — NITROGLYCERIN 0.4 MG: 0.4 TABLET, ORALLY DISINTEGRATING SUBLINGUAL at 23:03

## 2024-04-25 RX ADMIN — NITROGLYCERIN 0.4 MG: 0.4 TABLET, ORALLY DISINTEGRATING SUBLINGUAL at 22:49

## 2024-04-25 ASSESSMENT — ACTIVITIES OF DAILY LIVING (ADL)
ADLS_ACUITY_SCORE: 38

## 2024-04-25 ASSESSMENT — ENCOUNTER SYMPTOMS
VOMITING: 0
FEVER: 1
CHEST TIGHTNESS: 1
DIARRHEA: 0
COUGH: 0
CHILLS: 0
PALPITATIONS: 1
SHORTNESS OF BREATH: 1
ABDOMINAL PAIN: 0

## 2024-04-26 LAB
GLUCOSE BLDC GLUCOMTR-MCNC: 178 MG/DL (ref 70–99)
GLUCOSE BLDC GLUCOMTR-MCNC: 183 MG/DL (ref 70–99)
GLUCOSE BLDC GLUCOMTR-MCNC: 222 MG/DL (ref 70–99)
HBA1C MFR BLD: 7.4 %
HOLD SPECIMEN: NORMAL
TROPONIN T SERPL HS-MCNC: 39 NG/L
TROPONIN T SERPL HS-MCNC: 39 NG/L

## 2024-04-26 PROCEDURE — 99254 IP/OBS CNSLTJ NEW/EST MOD 60: CPT | Performed by: STUDENT IN AN ORGANIZED HEALTH CARE EDUCATION/TRAINING PROGRAM

## 2024-04-26 PROCEDURE — 82962 GLUCOSE BLOOD TEST: CPT

## 2024-04-26 PROCEDURE — 250N000011 HC RX IP 250 OP 636: Performed by: INTERNAL MEDICINE

## 2024-04-26 PROCEDURE — 250N000012 HC RX MED GY IP 250 OP 636 PS 637: Performed by: HOSPITALIST

## 2024-04-26 PROCEDURE — 120N000004 HC R&B MS OVERFLOW

## 2024-04-26 PROCEDURE — G0378 HOSPITAL OBSERVATION PER HR: HCPCS

## 2024-04-26 PROCEDURE — 250N000011 HC RX IP 250 OP 636

## 2024-04-26 PROCEDURE — 96372 THER/PROPH/DIAG INJ SC/IM: CPT | Performed by: INTERNAL MEDICINE

## 2024-04-26 PROCEDURE — 250N000013 HC RX MED GY IP 250 OP 250 PS 637: Performed by: INTERNAL MEDICINE

## 2024-04-26 PROCEDURE — 96374 THER/PROPH/DIAG INJ IV PUSH: CPT

## 2024-04-26 PROCEDURE — 250N000013 HC RX MED GY IP 250 OP 250 PS 637: Performed by: STUDENT IN AN ORGANIZED HEALTH CARE EDUCATION/TRAINING PROGRAM

## 2024-04-26 PROCEDURE — 36415 COLL VENOUS BLD VENIPUNCTURE: CPT | Performed by: INTERNAL MEDICINE

## 2024-04-26 PROCEDURE — 99222 1ST HOSP IP/OBS MODERATE 55: CPT | Performed by: INTERNAL MEDICINE

## 2024-04-26 PROCEDURE — 96376 TX/PRO/DX INJ SAME DRUG ADON: CPT

## 2024-04-26 PROCEDURE — 250N000013 HC RX MED GY IP 250 OP 250 PS 637

## 2024-04-26 PROCEDURE — 250N000013 HC RX MED GY IP 250 OP 250 PS 637: Performed by: FAMILY MEDICINE

## 2024-04-26 PROCEDURE — 84484 ASSAY OF TROPONIN QUANT: CPT | Performed by: INTERNAL MEDICINE

## 2024-04-26 RX ORDER — NITROGLYCERIN 0.4 MG/1
0.4 TABLET SUBLINGUAL EVERY 5 MIN PRN
Status: DISCONTINUED | OUTPATIENT
Start: 2024-04-26 | End: 2024-04-29 | Stop reason: HOSPADM

## 2024-04-26 RX ORDER — BUMETANIDE 1 MG/1
1 TABLET ORAL DAILY
Status: DISCONTINUED | OUTPATIENT
Start: 2024-04-26 | End: 2024-04-29 | Stop reason: HOSPADM

## 2024-04-26 RX ORDER — MAGNESIUM HYDROXIDE/ALUMINUM HYDROXICE/SIMETHICONE 120; 1200; 1200 MG/30ML; MG/30ML; MG/30ML
30 SUSPENSION ORAL EVERY 4 HOURS PRN
Status: DISCONTINUED | OUTPATIENT
Start: 2024-04-26 | End: 2024-04-29 | Stop reason: HOSPADM

## 2024-04-26 RX ORDER — NICOTINE POLACRILEX 4 MG
15-30 LOZENGE BUCCAL
Status: DISCONTINUED | OUTPATIENT
Start: 2024-04-26 | End: 2024-04-29 | Stop reason: HOSPADM

## 2024-04-26 RX ORDER — NALOXONE HYDROCHLORIDE 0.4 MG/ML
0.2 INJECTION, SOLUTION INTRAMUSCULAR; INTRAVENOUS; SUBCUTANEOUS
Status: DISCONTINUED | OUTPATIENT
Start: 2024-04-26 | End: 2024-04-29 | Stop reason: HOSPADM

## 2024-04-26 RX ORDER — MORPHINE SULFATE 2 MG/ML
2 INJECTION, SOLUTION INTRAMUSCULAR; INTRAVENOUS
Status: DISCONTINUED | OUTPATIENT
Start: 2024-04-26 | End: 2024-04-29

## 2024-04-26 RX ORDER — ASPIRIN 81 MG/1
81 TABLET ORAL DAILY
Status: DISCONTINUED | OUTPATIENT
Start: 2024-04-27 | End: 2024-04-29 | Stop reason: HOSPADM

## 2024-04-26 RX ORDER — ALBUTEROL SULFATE 90 UG/1
2 AEROSOL, METERED RESPIRATORY (INHALATION) EVERY 6 HOURS PRN
Status: DISCONTINUED | OUTPATIENT
Start: 2024-04-26 | End: 2024-04-29 | Stop reason: HOSPADM

## 2024-04-26 RX ORDER — AMLODIPINE BESYLATE 5 MG/1
10 TABLET ORAL DAILY
Status: DISCONTINUED | OUTPATIENT
Start: 2024-04-26 | End: 2024-04-29 | Stop reason: HOSPADM

## 2024-04-26 RX ORDER — ONDANSETRON 2 MG/ML
4 INJECTION INTRAMUSCULAR; INTRAVENOUS EVERY 6 HOURS PRN
Status: DISCONTINUED | OUTPATIENT
Start: 2024-04-26 | End: 2024-04-29 | Stop reason: HOSPADM

## 2024-04-26 RX ORDER — PROCHLORPERAZINE 25 MG
12.5 SUPPOSITORY, RECTAL RECTAL EVERY 12 HOURS PRN
Status: DISCONTINUED | OUTPATIENT
Start: 2024-04-26 | End: 2024-04-29 | Stop reason: HOSPADM

## 2024-04-26 RX ORDER — ROSUVASTATIN CALCIUM 40 MG/1
40 TABLET, COATED ORAL DAILY
Status: DISCONTINUED | OUTPATIENT
Start: 2024-04-26 | End: 2024-04-29 | Stop reason: HOSPADM

## 2024-04-26 RX ORDER — SPIRONOLACTONE 25 MG/1
25 TABLET ORAL DAILY
Status: DISCONTINUED | OUTPATIENT
Start: 2024-04-26 | End: 2024-04-29 | Stop reason: HOSPADM

## 2024-04-26 RX ORDER — ONDANSETRON 4 MG/1
4 TABLET, ORALLY DISINTEGRATING ORAL EVERY 6 HOURS PRN
Status: DISCONTINUED | OUTPATIENT
Start: 2024-04-26 | End: 2024-04-29 | Stop reason: HOSPADM

## 2024-04-26 RX ORDER — ROSUVASTATIN CALCIUM 40 MG/1
40 TABLET, COATED ORAL DAILY
COMMUNITY
Start: 2024-04-02

## 2024-04-26 RX ORDER — METOPROLOL SUCCINATE 25 MG/1
25 TABLET, EXTENDED RELEASE ORAL DAILY
Status: DISCONTINUED | OUTPATIENT
Start: 2024-04-26 | End: 2024-04-29 | Stop reason: HOSPADM

## 2024-04-26 RX ORDER — MORPHINE SULFATE 4 MG/ML
4 INJECTION, SOLUTION INTRAMUSCULAR; INTRAVENOUS
Status: DISCONTINUED | OUTPATIENT
Start: 2024-04-26 | End: 2024-04-26

## 2024-04-26 RX ORDER — ACETAMINOPHEN 650 MG/1
650 SUPPOSITORY RECTAL EVERY 4 HOURS PRN
Status: DISCONTINUED | OUTPATIENT
Start: 2024-04-26 | End: 2024-04-29 | Stop reason: HOSPADM

## 2024-04-26 RX ORDER — ALLOPURINOL 300 MG/1
300 TABLET ORAL 2 TIMES DAILY
Status: DISCONTINUED | OUTPATIENT
Start: 2024-04-26 | End: 2024-04-29 | Stop reason: HOSPADM

## 2024-04-26 RX ORDER — NALOXONE HYDROCHLORIDE 0.4 MG/ML
0.4 INJECTION, SOLUTION INTRAMUSCULAR; INTRAVENOUS; SUBCUTANEOUS
Status: DISCONTINUED | OUTPATIENT
Start: 2024-04-26 | End: 2024-04-29 | Stop reason: HOSPADM

## 2024-04-26 RX ORDER — PANTOPRAZOLE SODIUM 40 MG/1
40 TABLET, DELAYED RELEASE ORAL
Status: DISCONTINUED | OUTPATIENT
Start: 2024-04-26 | End: 2024-04-29 | Stop reason: HOSPADM

## 2024-04-26 RX ORDER — TRAZODONE HYDROCHLORIDE 50 MG/1
150 TABLET, FILM COATED ORAL
Status: DISCONTINUED | OUTPATIENT
Start: 2024-04-26 | End: 2024-04-29 | Stop reason: HOSPADM

## 2024-04-26 RX ORDER — ACETAMINOPHEN 325 MG/1
650 TABLET ORAL EVERY 4 HOURS PRN
Status: DISCONTINUED | OUTPATIENT
Start: 2024-04-26 | End: 2024-04-29 | Stop reason: HOSPADM

## 2024-04-26 RX ORDER — ENOXAPARIN SODIUM 100 MG/ML
40 INJECTION SUBCUTANEOUS EVERY 24 HOURS
Status: DISCONTINUED | OUTPATIENT
Start: 2024-04-26 | End: 2024-04-29

## 2024-04-26 RX ORDER — MORPHINE SULFATE 4 MG/ML
4 INJECTION, SOLUTION INTRAMUSCULAR; INTRAVENOUS
Status: DISCONTINUED | OUTPATIENT
Start: 2024-04-26 | End: 2024-04-29

## 2024-04-26 RX ORDER — DEXTROSE MONOHYDRATE 25 G/50ML
25-50 INJECTION, SOLUTION INTRAVENOUS
Status: DISCONTINUED | OUTPATIENT
Start: 2024-04-26 | End: 2024-04-29 | Stop reason: HOSPADM

## 2024-04-26 RX ORDER — PROCHLORPERAZINE MALEATE 5 MG
5 TABLET ORAL EVERY 6 HOURS PRN
Status: DISCONTINUED | OUTPATIENT
Start: 2024-04-26 | End: 2024-04-29 | Stop reason: HOSPADM

## 2024-04-26 RX ORDER — ASPIRIN 81 MG/1
162 TABLET, CHEWABLE ORAL ONCE
Status: COMPLETED | OUTPATIENT
Start: 2024-04-26 | End: 2024-04-26

## 2024-04-26 RX ADMIN — ASPIRIN 81 MG CHEWABLE TABLET 162 MG: 81 TABLET CHEWABLE at 00:46

## 2024-04-26 RX ADMIN — MORPHINE SULFATE 2 MG: 2 INJECTION, SOLUTION INTRAMUSCULAR; INTRAVENOUS at 08:19

## 2024-04-26 RX ADMIN — MORPHINE SULFATE 2 MG: 2 INJECTION, SOLUTION INTRAMUSCULAR; INTRAVENOUS at 17:23

## 2024-04-26 RX ADMIN — SPIRONOLACTONE 25 MG: 25 TABLET, FILM COATED ORAL at 08:19

## 2024-04-26 RX ADMIN — MORPHINE SULFATE 2 MG: 2 INJECTION, SOLUTION INTRAMUSCULAR; INTRAVENOUS at 04:21

## 2024-04-26 RX ADMIN — AMLODIPINE BESYLATE 10 MG: 5 TABLET ORAL at 08:19

## 2024-04-26 RX ADMIN — PANTOPRAZOLE SODIUM 40 MG: 40 TABLET, DELAYED RELEASE ORAL at 06:08

## 2024-04-26 RX ADMIN — ALBUTEROL SULFATE 2 PUFF: 90 AEROSOL, METERED RESPIRATORY (INHALATION) at 20:00

## 2024-04-26 RX ADMIN — INSULIN ASPART 1 UNITS: 100 INJECTION, SOLUTION INTRAVENOUS; SUBCUTANEOUS at 17:23

## 2024-04-26 RX ADMIN — ALLOPURINOL 300 MG: 300 TABLET ORAL at 20:41

## 2024-04-26 RX ADMIN — ACETAMINOPHEN 650 MG: 325 TABLET ORAL at 08:18

## 2024-04-26 RX ADMIN — ROSUVASTATIN CALCIUM 40 MG: 40 TABLET, COATED ORAL at 08:19

## 2024-04-26 RX ADMIN — ALLOPURINOL 300 MG: 300 TABLET ORAL at 08:19

## 2024-04-26 RX ADMIN — ISOSORBIDE MONONITRATE 15 MG: 30 TABLET, EXTENDED RELEASE ORAL at 12:39

## 2024-04-26 RX ADMIN — NITROGLYCERIN 7.5 MG: 20 OINTMENT TOPICAL at 00:16

## 2024-04-26 RX ADMIN — NITROGLYCERIN 0.4 MG: 0.4 TABLET, ORALLY DISINTEGRATING SUBLINGUAL at 19:53

## 2024-04-26 RX ADMIN — ONDANSETRON 4 MG: 2 INJECTION INTRAMUSCULAR; INTRAVENOUS at 19:49

## 2024-04-26 RX ADMIN — INSULIN ASPART 1 UNITS: 100 INJECTION, SOLUTION INTRAVENOUS; SUBCUTANEOUS at 21:25

## 2024-04-26 RX ADMIN — ACETAMINOPHEN 650 MG: 325 TABLET ORAL at 23:38

## 2024-04-26 RX ADMIN — ENOXAPARIN SODIUM 40 MG: 40 INJECTION SUBCUTANEOUS at 08:18

## 2024-04-26 RX ADMIN — MORPHINE SULFATE 2 MG: 2 INJECTION, SOLUTION INTRAMUSCULAR; INTRAVENOUS at 14:07

## 2024-04-26 RX ADMIN — BUMETANIDE 1 MG: 1 TABLET ORAL at 08:19

## 2024-04-26 RX ADMIN — METOPROLOL SUCCINATE 25 MG: 25 TABLET, EXTENDED RELEASE ORAL at 11:11

## 2024-04-26 RX ADMIN — INSULIN ASPART 1 UNITS: 100 INJECTION, SOLUTION INTRAVENOUS; SUBCUTANEOUS at 14:15

## 2024-04-26 RX ADMIN — MORPHINE SULFATE 2 MG: 2 INJECTION, SOLUTION INTRAMUSCULAR; INTRAVENOUS at 11:11

## 2024-04-26 ASSESSMENT — ACTIVITIES OF DAILY LIVING (ADL)
WALKING_OR_CLIMBING_STAIRS_DIFFICULTY: YES
ADLS_ACUITY_SCORE: 35
CONCENTRATING,_REMEMBERING_OR_MAKING_DECISIONS_DIFFICULTY: NO
ADLS_ACUITY_SCORE: 35
ADLS_ACUITY_SCORE: 35
ADLS_ACUITY_SCORE: 38
ADLS_ACUITY_SCORE: 35
WALKING_OR_CLIMBING_STAIRS: AMBULATION DIFFICULTY, ASSISTANCE 1 PERSON
DRESSING/BATHING_DIFFICULTY: YES
ADLS_ACUITY_SCORE: 35
FALL_HISTORY_WITHIN_LAST_SIX_MONTHS: YES
TOILETING_ISSUES: YES
ADLS_ACUITY_SCORE: 38
TOILETING_ASSISTANCE: TOILETING DIFFICULTY, ASSISTANCE 1 PERSON
TOILETING: 1-->ASSISTANCE (EQUIPMENT/PERSON) NEEDED
ADLS_ACUITY_SCORE: 33
ADLS_ACUITY_SCORE: 35
DOING_ERRANDS_INDEPENDENTLY_DIFFICULTY: YES
DIFFICULTY_EATING/SWALLOWING: NO
ADLS_ACUITY_SCORE: 35
DEPENDENT_IADLS:: CLEANING;COOKING;LAUNDRY;SHOPPING;MEAL PREPARATION;MEDICATION MANAGEMENT;TRANSPORTATION
DRESSING/BATHING: BATHING DIFFICULTY, ASSISTANCE 1 PERSON
ADLS_ACUITY_SCORE: 35
EQUIPMENT_CURRENTLY_USED_AT_HOME: SHOWER CHAIR;WALKER, STANDARD
TOILETING: 1-->ASSISTANCE (EQUIPMENT/PERSON) NEEDED (NOT DEVELOPMENTALLY APPROPRIATE)
ADLS_ACUITY_SCORE: 35
HEARING_DIFFICULTY_OR_DEAF: NO
NUMBER_OF_TIMES_PATIENT_HAS_FALLEN_WITHIN_LAST_SIX_MONTHS: 8
ADLS_ACUITY_SCORE: 35
DIFFICULTY_COMMUNICATING: NO
WEAR_GLASSES_OR_BLIND: NO
CHANGE_IN_FUNCTIONAL_STATUS_SINCE_ONSET_OF_CURRENT_ILLNESS/INJURY: YES

## 2024-04-26 NOTE — ED NOTES
"[facility] ED Handoff Report    ED Chief Complaint: Chest Pain, SOB    Patient arrives from home. Reports chest pain and shortness of breath that started 1 week ago. Patient reports that pain is in the upper part of chest and does not radiate. Patient also reports he was called today by his clinic and told \"his blood is not good\".  services call was dropped during triage.    Per chart review: Patient was admitted to this ED from 4/11/24 to 4/15/24 for NSTEMI. Presents with chest and lower abdominal pain and found to have elevated troponin with T wave inversions on EKG. BNP elevated. CT chest negative for PE or dissection. There was incidental finding of ulceration of the proximal SMA plaque at was seen on prior study done in 2020.  There is a incidental finding of advanced atherosclerotic disease with 3.9 x 3.1 cm infrarenal abdominal aortic aneurysm which has slightly increased in size. mitral leaflets appear thickened, hooded, and/or redundant, consistent with myxomatous degeneration, mild to moderate mitral regurgitation, and mod-severe to severe (3-4+) aortic regurgitation. Coronary angiogram 4/12 showed stable CAD and intervention not performed. Elevated LVEDP indicative of CHF. Patient was discharged with bumex 1 mg daily and 25mg spironolactone.     ED Diagnosis:  (R07.9) Chest pain, unspecified type  Comment:   Plan:     (I25.10) Arteriosclerotic cardiovascular disease (ASCVD)  Comment:   Plan:     (I50.32) Chronic diastolic heart failure (H)  Comment:   Plan:     (Z86.73) History of CVA (cerebrovascular accident)  Comment:   Plan:        PMH:    Past Medical History:   Diagnosis Date    Anxiety     Back pain     Cerebral vascular accident (H)     Chest pain     Chronic pain     Diarrhea     Dyslipidemia 9/1/2014    Gout     Hemorrhagic stroke (H)     Hypertension         Code Status:  Prior     Falls Risk: No Band: Not applicable    Current Living Situation/Residence: lives with a " "significant other and lives in a house     Elimination Status: Continent: Yes     Activity Level: Independent    Patients Preferred Language:  English     Needed: Yes    Vital Signs:  BP (!) 140/65   Pulse 87   Temp 99.2  F (37.3  C) (Temporal)   Resp 25   Ht 1.626 m (5' 4\")   Wt 79.4 kg (175 lb)   SpO2 94%   BMI 30.04 kg/m       Cardiac Rhythm: Sinus    Pain Score: 8/10    Is the Patient Confused:  No    Last Food or Drink: 04/25/24 at 2000    Focused Assessment:  Aox4; increased SOB over the past week; lung sounds clear; low grade fever; leg edema; unspecified, generalized chest pain 8/10; Trop 44 to 40; Hx of NSTEMI, HLD, HTN, CAD, mitral valve disorder, and DM2.    Tests Performed: Done: Labs and Imaging    Treatments Provided:  Nitroglycerin admin    Family Dynamics/Concerns: No    Family Updated On Visitor Policy: Yes    Plan of Care Communicated to Family: Yes    Who Was Updated about Plan of Care: Wife Donovan Yeung Checklist Done and Signed by Patient: Yes    Medications sent with patient: N/A    Covid: symptomatic, negative    Additional Information:     RN: Horacio Acre RN 4/25/2024 11:39 PM   "

## 2024-04-26 NOTE — CONSULTS
Care Management Initial Consult    General Information  Assessment completed with: Patient,  utilizing [employer]  services        Primary Care Provider verified and updated as needed:   yes  Readmission within the last 30 days:        Reason for Consult: discharge planning  Advance Care Planning: Advance Care Planning Reviewed: no concerns identified          Communication Assessment  Patient's communication style: spoken language (English or Bilingual)    Hearing Difficulty or Deaf: no   Wear Glasses or Blind: no    Cognitive  Cognitive/Neuro/Behavioral: WDL                      Living Environment:   People in home: child(gregory), adult, spouse     Current living Arrangements: house      Able to return to prior arrangements: yes       Family/Social Support:  Care provided by: self, spouse/significant other, child(gregory)  Provides care for: no one, unable/limited ability to care for self  Marital Status:   Significant Other, Children       Yia  Description of Support System: Supportive, Involved    Support Assessment: Adequate family and caregiver support    Current Resources:   Patient receiving home care services: No     Community Resources: PCA  Equipment currently used at home: shower chair, walker, standard  Supplies currently used at home:      Employment/Financial:  Employment Status: retired        Financial Concerns:   NA          Does the patient's insurance plan have a 3 day qualifying hospital stay waiver?  No    Lifestyle & Psychosocial Needs:  Social Determinants of Health     Food Insecurity: Not on file   Depression: Not on file   Housing Stability: Not on file   Tobacco Use: Unknown (4/25/2024)    Patient History     Smoking Tobacco Use: Never     Smokeless Tobacco Use: Unknown     Passive Exposure: Not on file   Financial Resource Strain: Not on file   Alcohol Use: Not on file   Transportation Needs: Not on file   Physical Activity: Not on file   Interpersonal Safety: Not on file    Stress: Not on file   Social Connections: Unknown (4/19/2023)    Received from [facility]    Social Connections     Frequency of Communication with Friends and Family: Not on file   Health Literacy: Not on file       Functional Status:  Prior to admission patient needed assistance:   Dependent ADLs:: Ambulation-cane, Ambulation-walker, Bathing, Dressing  Dependent IADLs:: Cleaning, Cooking, Laundry, Shopping, Meal Preparation, Medication Management, Transportation       Mental Health Status:NA          Chemical Dependency Status:NA                Values/Beliefs:  Spiritual, Cultural Beliefs, Anabaptism Practices, Values that affect care:       Shamanism           Additional Information:  SW and SYED[employer] intern met with pt and pt significant other Donovan in room and utilized [employer]  services for pts primary language of [facility]. Pt lives in house and has 2 sons living with him that assist him with 10.5 hours of PCA services provided by son. STORM notice explained to pt. Pt stated understanding. Pt family to transport home at discharge. Pt does not have home care services at this time and is not on oxygen at home.  Currently on 2L. CM to follow for recommendations.SW received a voicemail from  Carol Ann Hightower care coordinator [phone].voicemail left in return.  Family to transport at discharge.     KEVON Ramos

## 2024-04-26 NOTE — MEDICATION SCRIBE - ADMISSION MEDICATION HISTORY
Medication Scribe Admission Medication History    Admission medication history is complete. The information provided in this note is only as accurate as the sources available at the time of the update.    Information Source(s): Patient via in-person    Pertinent Information:     Changes made to PTA medication list:  Added: None  Deleted: Cholecalciferol (per patient)  Changed: Rosuvastatin from 20 mg daily to 40 mg daily (per fill history)    Allergies reviewed with patient and updates made in EHR: yes    Medication History Completed By: Dave Yang 4/26/2024 12:28 AM    PTA Med List   Medication Sig Last Dose    acetaminophen (TYLENOL) 650 MG CR tablet Take 650 mg by mouth every 8 hours as needed Unknown at prn    allopurinol (ZYLOPRIM) 300 MG tablet Take 300 mg by mouth 2 times daily 4/25/2024 at pm    amLODIPine (NORVASC) 10 MG tablet Take 10 mg by mouth daily 4/25/2024 at am    aspirin 81 MG EC tablet Take 81 mg by mouth daily 4/25/2024 at am    bumetanide (BUMEX) 1 MG tablet Take 1 tablet (1 mg) by mouth daily 4/25/2024 at am    diclofenac (VOLTAREN) 1 % topical gel Apply 4 g topically 4 times daily as needed Unknown at prn    pantoprazole (PROTONIX) 40 MG EC tablet Take 1 tablet (40 mg) by mouth every morning (before breakfast) 4/25/2024 at am    rosuvastatin (CRESTOR) 40 MG tablet Take 40 mg by mouth daily 4/25/2024 at am    spironolactone (ALDACTONE) 25 MG tablet Take 1 tablet (25 mg) by mouth daily 4/25/2024 at am    traZODone (DESYREL) 150 MG tablet Take 150 mg by mouth nightly as needed 4/24/2024 at hs

## 2024-04-26 NOTE — PROGRESS NOTES
[facility]    Medicine Progress Note - Hospitalist Service    Date of Admission:  4/25/2024    Assessment & Plan                Sedrick Murray is a 74 year old male with history of hemorrhagic stroke, coronary artery disease, diastolic heart failure, gout, recent hospitalization 4/11-4/15 for CHF and chest pain, underwent coronary angiogram without a clear intervenable culprit lesion.  Patient symptoms had improved and he discharged home.  Now he is back for worsening chest pain once again. Hospital Day: 2    # Chest pain  -presented with chest pain previously, had angiogram showed unchanged findings of 80 % stenosis of distal LAD lesion; massively aneurysmal RCA. Felt to be unlikely to be cause of chest pain and is a high risk candidate for DAPT due to history of hemorrhagic stroke, therefore stent risk/benefit profile felt unfavorable. Medical mgmt recommended. There was mention that angioplaty of LAD lesion could be considered if patient's symptoms did not resolve. Last time symptoms had resolved and patient discharged.  -now back with chest pain. Trop 42-->39. No EKG changes.  -cardiology consultation  -did eat breakfast but will now make him NPO  -Had CT PE run and abd CT last time to work up any non cardiac causes of chest pain. Nothing obvious seen. Could need further eval if pain persists despite cardiac optimization.  -Continue home rosuvastatin, baby aspirin    #Aortic regurgitation  -Moderate to severe on recent echo  -No need for acute intervention per cardiology  -Follow-up as outpatient with his regular cardiologist    # Chronic diastolic heart failure  -Continue home Bumex, spironolactone  -Recently taken off of his ARB    # History of hemorrhagic stroke  -Tolerates baby aspirin  -Continue home statin    # Suspected VICTORINA  -Noted with nocturnal hypoxia during last hospital stay and chronically with metabolic alkalosis on his BMP.  -Recommend outpatient sleep  "evaluation    #DM  -A1c 7.1  -Not on medications  -hypoglycemia protocol, sliding scale    # Insomnia, home trazodone  # GERD, home PPI  # Hypertension, home amlodipine, spironolactone  # Gout, home allopurinol       Observation Goals: List all goals to be met before discharge home: , - Serial troponins and stress test complete., - Seen and cleared by consultant if applicable, - Adequate pain control on oral analgesia, - Vital signs normal or at patient baseline, - Safe disposition plan has been identified, - Nurse to notify provider when observation goals have been met and patient is ready for discharge.  Diet: NPO per Anesthesia Guidelines for Procedure/Surgery Except for: Meds    DVT Prophylaxis: Moderate risk.   Enoxaparin (Lovenox) SQ  Eugene Catheter: Not present  Lines: None     Cardiac Monitoring: ACTIVE order. Indication: Chest pain/ ACS rule out (24 hours)  Code Status: Full Code      Clinically Significant Risk Factors Present on Admission                # Drug Induced Platelet Defect: home medication list includes an antiplatelet medication   # Hypertension: Noted on problem list     # DMII: A1C = 7.4 % (Ref range: <5.7 %) within past 6 months    # Overweight: Estimated body mass index is 29.33 kg/m  as calculated from the following:    Height as of this encounter: 1.626 m (5' 4\").    Weight as of this encounter: 77.5 kg (170 lb 14.4 oz).              Disposition Plan     Medically Ready for Discharge: Anticipated Tomorrow         Discharge barrier(s): pain control, possible angio      Daysi Alcala MD  Hospitalist Service  [facility]  Securely message with [employer] (more info)  Text page via [employer] Paging/Directory   ______________________________________________________________________      Physical Exam   Vital Signs: Temp: 97.6  F (36.4  C) Temp src: Oral BP: (!) 147/69 Pulse: 74   Resp: 24 SpO2: 97 % O2 Device: Nasal cannula Oxygen Delivery: 2 LPM  Weight: 170 lbs 14.4 " oz    General: in no apparent distress, non-toxic, and alert male lying in hospital bed oriented x3  HEENT: Head normocephalic atraumatic, oral mucosa moist. Sclerae anicteric  Skin: No rashes or lesions  Extremities: No peripheral edema  Psych: Normal affect, mood euthymic  Neuro: Grossly normal      Medical Decision Making               Data   Recent Results (from the past 16 hour(s))   Basic metabolic panel    Collection Time: 04/25/24  8:26 PM   Result Value Ref Range    Sodium 138 135 - 145 mmol/L    Potassium 3.5 3.4 - 5.3 mmol/L    Chloride 97 (L) 98 - 107 mmol/L    Carbon Dioxide (CO2) 31 (H) 22 - 29 mmol/L    Anion Gap 10 7 - 15 mmol/L    Urea Nitrogen 22.1 8.0 - 23.0 mg/dL    Creatinine 1.08 0.67 - 1.17 mg/dL    GFR Estimate 72 >60 mL/min/1.73m2    Calcium 9.5 8.8 - 10.2 mg/dL    Glucose 121 (H) 70 - 99 mg/dL   Magnesium    Collection Time: 04/25/24  8:26 PM   Result Value Ref Range    Magnesium 1.8 1.7 - 2.3 mg/dL   Blood gas venous    Collection Time: 04/25/24  8:26 PM   Result Value Ref Range    pH Venous 7.41 7.32 - 7.43    pCO2 Venous 55 (H) 40 - 50 mm Hg    pO2 Venous 19 (L) 25 - 47 mm Hg    Bicarbonate Venous 35 (H) 21 - 28 mmol/L    Base Excess/Deficit Venous 9.9 (H) -3.0 - 3.0 mmol/L    FIO2 21     Oxyhemoglobin Venous 28 (L) 70 - 75 %    O2 Sat, Venous 28.3 (L) 70.0 - 75.0 %   Nt probnp inpatient (BNP)    Collection Time: 04/25/24  8:26 PM   Result Value Ref Range    N terminal Pro BNP Inpatient 308 0 - 900 pg/mL   CBC with platelets and differential    Collection Time: 04/25/24  8:26 PM   Result Value Ref Range    WBC Count 6.7 4.0 - 11.0 10e3/uL    RBC Count 5.46 4.40 - 5.90 10e6/uL    Hemoglobin 16.4 13.3 - 17.7 g/dL    Hematocrit 50.8 40.0 - 53.0 %    MCV 93 78 - 100 fL    MCH 30.0 26.5 - 33.0 pg    MCHC 32.3 31.5 - 36.5 g/dL    RDW 12.7 10.0 - 15.0 %    Platelet Count 179 150 - 450 10e3/uL    % Neutrophils 74 %    % Lymphocytes 15 %    % Monocytes 8 %    % Eosinophils 2 %    % Basophils 0 %     % Immature Granulocytes 1 %    NRBCs per 100 WBC 0 <1 /100    Absolute Neutrophils 4.9 1.6 - 8.3 10e3/uL    Absolute Lymphocytes 1.0 0.8 - 5.3 10e3/uL    Absolute Monocytes 0.5 0.0 - 1.3 10e3/uL    Absolute Eosinophils 0.2 0.0 - 0.7 10e3/uL    Absolute Basophils 0.0 0.0 - 0.2 10e3/uL    Absolute Immature Granulocytes 0.1 <=0.4 10e3/uL    Absolute NRBCs 0.0 10e3/uL   Troponin T, High Sensitivity    Collection Time: 04/25/24  8:26 PM   Result Value Ref Range    Troponin T, High Sensitivity 42 (H) <=22 ng/L   Hemoglobin A1c    Collection Time: 04/25/24  8:26 PM   Result Value Ref Range    Hemoglobin A1C 7.4 (H) <5.7 %   Extra Blue Top Tube    Collection Time: 04/25/24  8:34 PM   Result Value Ref Range    Hold Specimen JIC    Extra Red Top Tube    Collection Time: 04/25/24  8:34 PM   Result Value Ref Range    Hold Specimen JIC    Symptomatic Influenza A/B, RSV, & SARS-CoV2 PCR (COVID-19) Nasopharyngeal    Collection Time: 04/25/24  9:43 PM    Specimen: Nasopharyngeal; Swab   Result Value Ref Range    Influenza A PCR Negative Negative    Influenza B PCR Negative Negative    RSV PCR Negative Negative    SARS CoV2 PCR Negative Negative   Troponin T, High Sensitivity    Collection Time: 04/25/24 10:33 PM   Result Value Ref Range    Troponin T, High Sensitivity 40 (H) <=22 ng/L   Troponin T, High Sensitivity - now then in 2 hours x 2    Collection Time: 04/26/24 12:54 AM   Result Value Ref Range    Troponin T, High Sensitivity 39 (H) <=22 ng/L   Troponin T, High Sensitivity - now then in 2 hours x 2    Collection Time: 04/26/24  3:59 AM   Result Value Ref Range    Troponin T, High Sensitivity 39 (H) <=22 ng/L   Extra Purple Top EDTA (LAB USE ONLY)    Collection Time: 04/26/24  4:00 AM   Result Value Ref Range    Hold Specimen JIC        Interval History     Nurse reports patient has had persistent pain requiring IV Dilaudid.  Patient did not want to visit with me right now, states he needs to use the bathroom.  Management  per cardiology.  Patient did eat breakfast, currently n.p.o. in case interventional procedure recommended.  Not ready for discharge.

## 2024-04-26 NOTE — PLAN OF CARE
Problem: Chest Pain  Goal: Resolution of Chest Pain Symptoms  Outcome: Not Progressing  Intervention: Manage Acute Chest Pain  Recent Flowsheet Documentation  Taken 4/26/2024 1612 by Alexus Rivas RN  Chest Pain Intervention: activity minimized   Goal Outcome Evaluation:       Pt is AOX4. Pt c/o chest pain rated 4/10 and 3/10 during my shift, PRN Q3 Morphine administered w/ relief. Pt on 2L NC for comfort. VSS. Tele NSR. Pt c/o SOB. Pt kept NPO. Family at bedside.                -1845 Pt c/o worsening SOB. Lung sounds are WDL. Pt on 2L NC and sating high 90's. Pt denies CP. Tele NSR. MD notified.

## 2024-04-26 NOTE — CONSULTS
[facility] [address], [place] [address]   [email or web address]   OFFICE: [phone]     CARDIOLOGY INPATIENT CONSULT NOTE     Impression and Plan     Assessment/Plan:  Mr. Sedrick Murray is a 74 year old male with CAD managed medically, HFpEF, HTN, mod-severe AI,  and prior hemorrhagic CVA in 2017 who presented to the hospital with chest pain.      Chest pain    - Known distal LAD stenosis, appears similar to description of OS angiogram from 5/2023.     - Start imdur at 15mg.     - Start metoprolol succinate at 25mg qdaily   - Unclear to me that this distal stenosis is the culprit for new symptoms given stability of the lesion.     - If pain does not improve could consider PCI, though this was thought to be of low benefit and high risk (due to hx of hemorrhagic CVA) by his primary cardiologist at [facility].     Aortic regurgitation, mod to severe    - Some evidence of holodiastolic reversal on echo, however normal LV dimensions and LVEF   - Would recommend follow up with primary cardiologist.      Chronic HFpEF   - Euvolemic on exam   - BNP improved compared to 2 weeks ago   - Continue PO diuretic    Will continue to follow    Primary Cardiologist: Dr. Marie with [facility]    History of Present Illness      Mr. Sedrick Murray is a 74 year old male with CAD managed medically, HFpEF, HTN and prior hemorrhagic CVA in 2017 who presented to the hospital with chest pain.  The patient notes pain has been ongoing for the last month.  He reports tightness or squeezing in his chest worse when walking and laying down.  He had a similar prior admission on 4/12.  He underwent LHC which showed stable severe disease in the distal LAD.  He was started on imdur however this was held on discharge due to an episode of relative hypotension which was attributed to the imdur.       Review of Systems:  Further review of systems is otherwise negative/noncontributory (based on review of  "medical record (admission H&P) and 13 point review of systems reviewed. Pertinent positives noted).    Cardiac Diagnostics     ECG: Personally reviewed and interpreted: 4/25/2024  NSR, sinus arrhythmia  Nonspecific STT    Most recent:  Echocardiogram (results reviewed): 4/11/2024  Interpretation Summary     The left ventricle is normal in size.  There is mild concentric left ventricular hypertrophy.  The visual ejection fraction is 55-60%.  No regional wall motion abnormalities noted.  The right ventricle is normal size.  The right ventricular systolic function is normal.  The mitral valve leaflets are mildly thickened.  The mitral leaflets appear thickened, hooded, and/or redundant, consistent  with myxomatous degeneration.  Redundant elongated chordae are noted.  There is mild to moderate (1-2+) mitral regurgitation.  Cannot see the individual leaflets of the AV on this study.  Trivial pericardial effusion  Sinus rhythm was noted.  There is no comparison study available.    Cardiac Cath (results reviewed): 4/12/2024    Dist LAD lesion is 80% stenosed.    Prox LAD to Mid LAD lesion is 20% stenosed.    Mid LAD lesion is 50% stenosed.    1st Diag lesion is 30% stenosed.    Mid Cx to Dist Cx lesion is 30% stenosed.    RPDA lesion is 40% stenosed.    Prox RCA to Dist RCA lesion is 15% stenosed.     1.  Left main large, no stenoses.  2.  LAD has diffuse lumen irregularity proximal to mid segment; moderate stenoses mid segment and 70 to 80% segment apical LAD.    3.  Left circumflex system appears normal.  4.  Huge aneurysmal right coronary artery with diffuse luminal irregularity.  Mild plaquing proximal PDA and apical PDA segment.  No apparent flow-limiting stenoses.  5.  No apparent change since angiogram in January at [facility].  No  \"unstable plaque\".  Probable demand ischemia due to fixed coronary disease and severe hypertension.  Patient suboptimal candidate for stenting due to history of CNS bleed and " anticipated requirement of dual antiplatelet therapy post stent.  Could consider stand-alone PTCA of apical LAD segment if symptoms persist or recur.        Medical History  Surgical History Family History Social History   Past Medical History:   Diagnosis Date    Anxiety     Back pain     Cerebral vascular accident (H)     Chest pain     Chronic pain     Diarrhea     Dyslipidemia 9/1/2014    Gout     Hemorrhagic stroke (H)     Hypertension      Past Surgical History:   Procedure Laterality Date    CHOLECYSTECTOMY      CV CORONARY ANGIOGRAM N/A 4/12/2024    Procedure: Coronary Angiogram;  Surgeon: Phu Delgado MD;  Location: [facility] CATH LAB CV    CV LEFT HEART CATH N/A 4/12/2024    Procedure: Left Heart Catheterization;  Surgeon: Phu Delgado MD;  Location: [facility] CATH LAB CV    IR MISCELLANEOUS PROCEDURE  3/9/2013    IR MISCELLANEOUS PROCEDURE  3/9/2013     Family History   Problem Relation Age of Onset    Cerebrovascular Disease Mother            Social History     Socioeconomic History    Marital status:      Spouse name: Not on file    Number of children: Not on file    Years of education: Not on file    Highest education level: Not on file   Occupational History    Not on file   Tobacco Use    Smoking status: Never    Smokeless tobacco: Not on file   Substance and Sexual Activity    Alcohol use: Yes    Drug use: No    Sexual activity: Not on file   Other Topics Concern    Not on file   Social History Narrative    Patient presented to the ED with his wife 04/25/17       Social Determinants of Health     Financial Resource Strain: Not on file   Food Insecurity: Not on file   Transportation Needs: Not on file   Physical Activity: Not on file   Stress: Not on file   Social Connections: Unknown (4/19/2023)    Received [facility]    Social Connections     Frequency of Communication with Friends and Family: Not on  "file   Interpersonal Safety: Not on file   Housing Stability: Not on file             Physical Examination   VITALS: BP (!) 147/69 (BP Location: Right arm, Patient Position: Supine, Cuff Size: Adult Regular)   Pulse 74   Temp 97.6  F (36.4  C) (Oral)   Resp 24   Ht 1.626 m (5' 4\")   Wt 77.5 kg (170 lb 14.4 oz)   SpO2 97%   BMI 29.33 kg/m    BMI: Body mass index is 29.33 kg/m .  Wt Readings from Last 3 Encounters:   04/26/24 77.5 kg (170 lb 14.4 oz)   04/15/24 79.7 kg (175 lb 9.6 oz)   09/02/22 75.6 kg (166 lb 10.7 oz)       Intake/Output Summary (Last 24 hours) at 4/26/2024 1200  Last data filed at 4/26/2024 1141  Gross per 24 hour   Intake 470 ml   Output 450 ml   Net 20 ml       General: pleasant male. No acute distress.   HENT: external ears normal. Nares patent. Mucous membranes moist.  Neck: No JVD  Lungs: clear to auscultation  COR:  regular rate and rhythm, No murmurs, rubs, or gallops  Abd: nondistended, BS present  Extrem: No edema         Non-cardiac Imaging Studies Reviewed             Lab Results Reviewed    Chemistry/lipid CBC Cardiac Enzymes/BNP/TSH/INR   Recent Labs   Lab Test 04/11/24  1215   CHOL 210*   HDL 80   LDL 61   TRIG 343*     Recent Labs   Lab Test 04/11/24  1215 05/29/17  0623 03/17/17  0447   LDL 61 73 120     Recent Labs   Lab Test 04/25/24 2026      POTASSIUM 3.5   CHLORIDE 97*   CO2 31*   *   BUN 22.1   CR 1.08   GFRESTIMATED 72   SAHARA 9.5     Recent Labs   Lab Test 04/25/24  2026 04/15/24  0456 04/14/24  0421   CR 1.08 1.24* 1.38*     Recent Labs   Lab Test 04/25/24 2026 03/17/17  0447   A1C 7.4* 5.8          Recent Labs   Lab Test 04/25/24 2026   WBC 6.7   HGB 16.4   HCT 50.8   MCV 93        Recent Labs   Lab Test 04/25/24 2026 04/14/24  0421 04/12/24  0555   HGB 16.4 16.5 16.0    Recent Labs   Lab Test 05/06/20  0520 05/05/20  2148   TROPONINI 0.03 0.03     Recent Labs   Lab Test 04/25/24 2026 04/11/24  1215   NTBNPI 308 1,801*     Recent Labs   Lab " Test 05/05/20  2148   TSH 1.06     Recent Labs   Lab Test 04/11/24  1216 05/05/20 2148   INR 0.99 0.98           Current Inpatient Scheduled Medications   Scheduled Meds:  Current Facility-Administered Medications   Medication Dose Route Frequency Provider Last Rate Last Admin    allopurinol (ZYLOPRIM) tablet 300 mg  300 mg Oral BID Archie Dye MD   300 mg at 04/26/24 0819    amLODIPine (NORVASC) tablet 10 mg  10 mg Oral Daily Archie Dye MD   10 mg at 04/26/24 0819    [START ON 4/27/2024] aspirin EC tablet 81 mg  81 mg Oral Daily Archie Dye MD        bumetanide (BUMEX) tablet 1 mg  1 mg Oral Daily Archie Dye MD   1 mg at 04/26/24 0819    enoxaparin ANTICOAGULANT (LOVENOX) injection 40 mg  40 mg Subcutaneous Q24H Archie Dye MD   40 mg at 04/26/24 0818    isosorbide mononitrate (IMDUR) 24 hr half-tab 15 mg  15 mg Oral Daily Cain, Marcel, DO        metoprolol succinate ER (TOPROL XL) 24 hr tablet 25 mg  25 mg Oral Daily Cain, Marcel, DO   25 mg at 04/26/24 1111    nitroGLYcerin (NITRO-BID) 2 % ointment 7.5 mg  0.5 inch Transdermal Once Manohar Ramirez MD   7.5 mg at 04/26/24 0016    pantoprazole (PROTONIX) EC tablet 40 mg  40 mg Oral QAM AC Archie Dye MD   40 mg at 04/26/24 0608    rosuvastatin (CRESTOR) tablet 40 mg  40 mg Oral Daily Archie Dye MD   40 mg at 04/26/24 0819    spironolactone (ALDACTONE) tablet 25 mg  25 mg Oral Daily Archie Dye MD   25 mg at 04/26/24 0819     Continuous Infusions:  Current Facility-Administered Medications   Medication Dose Route Frequency Provider Last Rate Last Admin       No current outpatient medications on file.          Medications Prior to Admission   Prior to Admission medications    Medication Sig Start Date End Date Taking? Authorizing Provider   acetaminophen (TYLENOL) 650 MG CR tablet Take 650 mg by mouth every 8 hours as needed   Yes Reported, Patient   allopurinol (ZYLOPRIM) 300 MG tablet Take 300 mg by mouth 2 times daily    "Yes Reported, Patient   amLODIPine (NORVASC) 10 MG tablet Take 10 mg by mouth daily 1/8/24  Yes Reported, Patient   aspirin 81 MG EC tablet Take 81 mg by mouth daily 1/8/24  Yes Reported, Patient   bumetanide (BUMEX) 1 MG tablet Take 1 tablet (1 mg) by mouth daily 4/16/24  Yes Krish Linn DO   diclofenac (VOLTAREN) 1 % topical gel Apply 4 g topically 4 times daily as needed   Yes Reported, Patient   pantoprazole (PROTONIX) 40 MG EC tablet Take 1 tablet (40 mg) by mouth every morning (before breakfast) 4/16/24  Yes Krish Linn DO   rosuvastatin (CRESTOR) 40 MG tablet Take 40 mg by mouth daily 4/2/24  Yes Reported, Patient   spironolactone (ALDACTONE) 25 MG tablet Take 1 tablet (25 mg) by mouth daily 4/16/24  Yes Krish Linn DO   traZODone (DESYREL) 150 MG tablet Take 150 mg by mouth nightly as needed   Yes Reported, Patient          Marcel DO Cain [facility]  Non-invasive Cardiologist  [facility]      Clinically Significant Risk Factors Present on Admission                # Drug Induced Platelet Defect: home medication list includes an antiplatelet medication   # Hypertension: Noted on problem list     # DMII: A1C = 7.4 % (Ref range: <5.7 %) within past 6 months    # Overweight: Estimated body mass index is 29.33 kg/m  as calculated from the following:    Height as of this encounter: 1.626 m (5' 4\").    Weight as of this encounter: 77.5 kg (170 lb 14.4 oz).                 "

## 2024-04-26 NOTE — PROGRESS NOTES
PRIMARY DIAGNOSIS: CHEST PAIN  OUTPATIENT/OBSERVATION GOALS TO BE MET BEFORE DISCHARGE:  1. Ruled out acute coronary syndrome (negative or stable Troponin):  Yes  2. Pain Status: Improved but still requiring IV narcotics.  3. Appropriate provocative testing performed: N/A  - Stress Test Procedure: N/A  - Interpretation of cardiac rhythm per telemetry tech: Sinus rhythm with inverted T-wave    4. Cleared by Consultants (if applicable):No  5. Return to near baseline physical activity: No - still experiencing SOB, weakness, & chest pain  Discharge Planner Nurse   Safe discharge environment identified: Yes  Barriers to discharge: Yes - ongoing angina, SOB, deconditioning.       Entered by: Abdelrahman Hallman RN 04/26/2024 11:34 AM     Please review provider order for any additional goals.   Nurse to notify provider when observation goals have been met and patient is ready for discharge.

## 2024-04-26 NOTE — PROGRESS NOTES
PRIMARY DIAGNOSIS: CHEST PAIN  OUTPATIENT/OBSERVATION GOALS TO BE MET BEFORE DISCHARGE:  1. Ruled out acute coronary syndrome (negative or stable Troponin):  Yes  2. Pain Status: Improved but still requiring IV narcotics.  3. Appropriate provocative testing performed: N/A  - Stress Test Procedure: N/A  - Interpretation of cardiac rhythm per telemetry tech: Sinus rhythm, inverted T wave.    4. Cleared by Consultants (if applicable):No  5. Return to near baseline physical activity: No - still with chest pain, SOB, & weakness.  Discharge Planner Nurse   Safe discharge environment identified: Yes  Barriers to discharge: Yes - ongoing angina.       Entered by: Abdelrahman Hallman RN 04/26/2024 2:34 PM    Pt A/O x 4. Vitally stable & saturating well on 2LPM via the NC - pt sats well on RA but feels more comfortable with the NC in. Lungs diminished & shallow with fine crackles to right lower lobe - pt endorses SOB. Pt continues to endorse ongoing angina rated 4/10 - pain managed with PRN morphine with little improvement. Pt did endorse improvement after new med - imdur - following this he rated his chest pain was 2/10. Unclear if cards is planning intervention at this time - pt kept NPO for possible intervention. Will continue to monitor.    Abdelrahman Hallman RN on 4/26/2024 at 2:39 PM     Please review provider order for any additional goals.   Nurse to notify provider when observation goals have been met and patient is ready for discharge.

## 2024-04-26 NOTE — ED PROVIDER NOTES
EMERGENCY DEPARTMENT ENCOUNTER      NAME: Sedrick Murray  AGE: 74 year old male  YOB: 1950  MRN: [id number]  EVALUATION DATE & TIME: 4/25/2024  7:44 PM    PCP: Milvia Costa    ED PROVIDER: Manohar Ramirez M.D.    Chief Complaint   Patient presents with    Chest Pain    Shortness of Breath       FINAL IMPRESSION:  1. Chest pain, unspecified type    2. Arteriosclerotic cardiovascular disease (ASCVD)    3. Chronic diastolic heart failure (H)    4. History of CVA (cerebrovascular accident)        ED COURSE & MEDICAL DECISION MAKING:    Pertinent Labs & Imaging studies independently interpreted by me. (See chart for details)  Reviewed prior hospital admission from April 11 to April 15 when patient was admitted with NSTEMI thought to be related from strain from CHF, no new coronary intervention at that time.    ED Course as of 04/26/24 0028   u Apr 25, 2024 2015 Patient seen and examined, complains of chest pain and shortness of breath which she says he has had since he was discharged from the hospital from admission for heart failure and NSTEMI.  On initial exam here, tachypneic although oxygen saturations are normal, lungs are clear, no lower extremity swelling noted on exam.  Labs ordered along with chest x-ray.  Consider CT PE study based on clinical course and results of other testing although CT PE study done 2 weeks ago was negative and had similar symptoms at that time.   2044 Venous blood gas independently interpreted by me with normal pH but pCO2 of 55 consistent with chronic hypercarbia.   2046 Chest x-ray independently interpreted by me negative for acute findings   2115 Labs ordered and independently interpreted by me with normal BNP, normal BMP, normal CBC, normal magnesium   2222 CT scan of the chest independently interpreted by me with no acute findings   2239 Patient still complaining of chest pain, nitroglycerin as ordered.   2301 Troponin is unchanged.   2308 Reviewed most recent  coronary angiogram from last admission which showed some diffuse luminal irregularities, patient was felt to be high risk for dual antiplatelet therapy given history of intracranial bleed and stroke.  Patient's pain improved after nitroglycerin, plan for admission for further evaluation and treatment.   2325 Care discussed with Dr. Dye for admission.   2358 Updated patient with plan.  Patient was feeling better after nitroglycerin but pains come back little bit.  Nitropaste will be ordered and applied.     At the conclusion of the encounter I discussed the results of all of the tests and the disposition. The questions were answered. The patient or family acknowledged understanding and was agreeable with the care plan.     Medical Decision Making  Obtained supplemental history:Supplemental history obtained?: Family Member/Significant Other  Reviewed external records: External records reviewed?: Documented in chart  Care impacted by chronic illness:Diabetes, Heart Disease, Hyperlipidemia, Hypertension, and Other: mitral valve disorder, chronic gout  Care significantly affected by social determinants of health:N/A  Did you consider but not order tests?: Work up considered but not performed and documented in chart, if applicable  Did you interpret images independently?: Independent interpretation of ECG and images noted in documentation, when applicable.  Consultation discussion with other provider:Did you involve another provider (consultant, MH, pharmacy, etc.)?: I discussed the care with another health care provider, see documentation for details.  Admit.    EKG:    Performed at: 7:53 PM  Impression: Normal EKG but limited due to poor quality  Rate: 84  Rhythm: Sinus  Axis: Normal  WV Interval: 206  QRS Interval: 88  QTc Interval: 425  ST Changes: No acute ischemic changes, nonspecific ST change  Comparison: April 13, no acute change    I have independently reviewed and interpreted the EKG(s) documented  above.    PROCEDURES:       MEDICATIONS GIVEN IN THE EMERGENCY:  Medications   nitroGLYcerin (NITRO-BID) 2 % ointment 7.5 mg (7.5 mg Transdermal $Patch/Med Applied 4/26/24 0016)   aspirin (ASA) chewable tablet 162 mg (has no administration in time range)   aspirin EC tablet 81 mg (has no administration in time range)   nitroGLYcerin (NITROSTAT) sublingual tablet 0.4 mg (has no administration in time range)   alum & mag hydroxide-simethicone (MAALOX) suspension 30 mL (has no administration in time range)   acetaminophen (TYLENOL) tablet 650 mg (has no administration in time range)     Or   acetaminophen (TYLENOL) Suppository 650 mg (has no administration in time range)   nitroGLYcerin (NITROSTAT) sublingual tablet 0.4 mg (0.4 mg Sublingual $Given 4/25/24 5155)       NEW PRESCRIPTIONS STARTED AT TODAY'S ER VISIT  New Prescriptions    No medications on file       =================================================================    HPI    Patient information was obtained from: Patient    Use Of : Yes (On Phone) Language - Sahra      Sedrick Murray is a 74 year old male with a pertinent history of NSTEMI, HLD, HTN, CAD, mitral valve disorder, and DM2 who presents to this ED via walk-in for evaluation of chest pain and shortness of breath. Patient reports chest pain and tightness and palpitations that started right after discharge from last hospital visit on 4/15/24. He endorses shortness of breath, leg swelling, fever, and pain to feet as well. Otherwise, patient denies any cough, vomiting, diarrhea, abdominal pain, or chills. No other complaints at this time.     Per chart review: Nelly was admitted to this ED from 4/11/24 to 4/15/24 for NSTEMI. Presents with chest and lower abdominal pain and found to have elevated troponin with T wave inversions on EKG. BNP elevated. CT chest negative for PE or dissection. There was incidental finding of ulceration of the proximal SMA plaque at was seen on prior study done  in 2020.  There is a incidental finding of advanced atherosclerotic disease with 3.9 x 3.1 cm infrarenal abdominal aortic aneurysm which has slightly increased in size. mitral leaflets appear thickened, hooded, and/or redundant, consistent with myxomatous degeneration, mild to moderate mitral regurgitation, and mod-severe to severe (3-4+) aortic regurgitation. Coronary angiogram 4/12 showed stable CAD and intervention not performed. Elevated LVEDP indicative of CHF. Patient was discharged with bumex 1 mg daily and 25mg spironolactone.       REVIEW OF SYSTEMS   Review of Systems   Constitutional:  Positive for fever. Negative for chills.   Respiratory:  Positive for chest tightness and shortness of breath. Negative for cough.    Cardiovascular:  Positive for chest pain, palpitations and leg swelling.   Gastrointestinal:  Negative for abdominal pain, diarrhea and vomiting.      All other systems reviewed and negative    PAST MEDICAL HISTORY:  Past Medical History:   Diagnosis Date    Anxiety     Back pain     Cerebral vascular accident (H)     Chest pain     Chronic pain     Diarrhea     Dyslipidemia 9/1/2014    Gout     Hemorrhagic stroke (H)     Hypertension        PAST SURGICAL HISTORY:  Past Surgical History:   Procedure Laterality Date    CHOLECYSTECTOMY      CV CORONARY ANGIOGRAM N/A 4/12/2024    Procedure: Coronary Angiogram;  Surgeon: Phu Delgado MD;  Location: [facility] CATH [facility] CV    CV LEFT HEART CATH N/A 4/12/2024    Procedure: Left Heart Catheterization;  Surgeon: Phu Delgado MD;  Location: [facility] CATH LAB CV    IR MISCELLANEOUS PROCEDURE  3/9/2013    IR MISCELLANEOUS PROCEDURE  3/9/2013       CURRENT MEDICATIONS:    Current Facility-Administered Medications   Medication Dose Route Frequency Provider Last Rate Last Admin    acetaminophen (TYLENOL) tablet 650 mg  650 mg Oral Q4H PRN Archie Dye MD        Or    acetaminophen (TYLENOL) Suppository 650 mg  650 mg Rectal Q4H PRN Marnie  MD Archie        alum & mag hydroxide-simethicone (MAALOX) suspension 30 mL  30 mL Oral Q4H PRN Archie Dye MD        aspirin (ASA) chewable tablet 162 mg  162 mg Oral Once Archie Dye MD        [START ON 4/27/2024] aspirin EC tablet 81 mg  81 mg Oral Daily Archie Dye MD        nitroGLYcerin (NITRO-BID) 2 % ointment 7.5 mg  0.5 inch Transdermal Once Manohar Ramirez MD   7.5 mg at 04/26/24 0016    nitroGLYcerin (NITROSTAT) sublingual tablet 0.4 mg  0.4 mg Sublingual Q5 Min PRN Archie Dye MD         Current Outpatient Medications   Medication Sig Dispense Refill    acetaminophen (TYLENOL) 650 MG CR tablet Take 650 mg by mouth every 8 hours as needed      allopurinol (ZYLOPRIM) 300 MG tablet Take 300 mg by mouth 2 times daily      amLODIPine (NORVASC) 10 MG tablet Take 10 mg by mouth daily      aspirin 81 MG EC tablet Take 81 mg by mouth daily      bumetanide (BUMEX) 1 MG tablet Take 1 tablet (1 mg) by mouth daily 30 tablet 1    CHOLECALCIFEROL 25 MCG (1000 UT) tablet Take 25 mcg by mouth daily      diclofenac (VOLTAREN) 1 % topical gel Apply 4 g topically 4 times daily as needed      pantoprazole (PROTONIX) 40 MG EC tablet Take 1 tablet (40 mg) by mouth every morning (before breakfast) 30 tablet 1    rosuvastatin (CRESTOR) 40 MG tablet Take 40 mg by mouth daily      spironolactone (ALDACTONE) 25 MG tablet Take 1 tablet (25 mg) by mouth daily 30 tablet 1    traZODone (DESYREL) 150 MG tablet Take 150 mg by mouth nightly as needed       Facility-Administered Medications Ordered in Other Encounters   Medication Dose Route Frequency Provider Last Rate Last Admin    iodixanol (VISIPAQUE 320) injection    Once PRN Phu Delgado MD   65 mL at 04/12/24 1410       ALLERGIES:  Allergies   Allergen Reactions    Dilaudid [Hydromorphone] Unknown    Phenytoin Hives and Itching       FAMILY HISTORY:  Family History   Problem Relation Age of Onset    Cerebrovascular Disease Mother        SOCIAL HISTORY:  "  Social History     Socioeconomic History    Marital status:    Tobacco Use    Smoking status: Never   Substance and Sexual Activity    Alcohol use: Yes    Drug use: No   Social History Narrative    Patient presented to the ED with his wife 04/25/17       Social Determinants of Health      [facility], [facility]    Social Connections       VITALS:  BP (!) 156/73   Pulse 86   Temp 99.2  F (37.3  C) (Temporal)   Resp 30   Ht 1.626 m (5' 4\")   Wt 79.4 kg (175 lb)   SpO2 96%   BMI 30.04 kg/m      PHYSICAL EXAM:  Physical Exam  Vitals and nursing note reviewed.   Constitutional:       Appearance: Normal appearance.      Comments: Appears weak.    HENT:      Head: Normocephalic and atraumatic.      Right Ear: External ear normal.      Left Ear: External ear normal.      Nose: Nose normal.      Mouth/Throat:      Mouth: Mucous membranes are moist.   Eyes:      Extraocular Movements: Extraocular movements intact.      Conjunctiva/sclera: Conjunctivae normal.      Pupils: Pupils are equal, round, and reactive to light.   Cardiovascular:      Rate and Rhythm: Normal rate and regular rhythm.   Pulmonary:      Effort: Pulmonary effort is normal.      Breath sounds: Normal breath sounds. No wheezing or rales.   Abdominal:      General: Abdomen is flat. There is no distension.      Palpations: Abdomen is soft.      Tenderness: There is no abdominal tenderness. There is no guarding.   Musculoskeletal:         General: Normal range of motion.      Cervical back: Normal range of motion and neck supple.      Right lower leg: No edema.      Left lower leg: No edema.   Lymphadenopathy:      Cervical: No cervical adenopathy.   Skin:     General: Skin is warm and dry.   Neurological:      General: No focal deficit present.      Mental Status: He is alert and oriented to person, place, and time. Mental status is at baseline.      Comments: No " gross focal neurologic deficits   Psychiatric:         Mood and Affect: Mood normal.         Behavior: Behavior normal.         Thought Content: Thought content normal.     LAB:  All pertinent labs reviewed and interpreted.  Results for orders placed or performed during the hospital encounter of 04/25/24   XR Chest Port 1 View    Impression    IMPRESSION: Negative chest.   Chest CT w/o contrast    Impression    IMPRESSION:   1.  No acute findings in the chest.     Basic metabolic panel   Result Value Ref Range    Sodium 138 135 - 145 mmol/L    Potassium 3.5 3.4 - 5.3 mmol/L    Chloride 97 (L) 98 - 107 mmol/L    Carbon Dioxide (CO2) 31 (H) 22 - 29 mmol/L    Anion Gap 10 7 - 15 mmol/L    Urea Nitrogen 22.1 8.0 - 23.0 mg/dL    Creatinine 1.08 0.67 - 1.17 mg/dL    GFR Estimate 72 >60 mL/min/1.73m2    Calcium 9.5 8.8 - 10.2 mg/dL    Glucose 121 (H) 70 - 99 mg/dL   Result Value Ref Range    Magnesium 1.8 1.7 - 2.3 mg/dL   Blood gas venous   Result Value Ref Range    pH Venous 7.41 7.32 - 7.43    pCO2 Venous 55 (H) 40 - 50 mm Hg    pO2 Venous 19 (L) 25 - 47 mm Hg    Bicarbonate Venous 35 (H) 21 - 28 mmol/L    Base Excess/Deficit Venous 9.9 (H) -3.0 - 3.0 mmol/L    FIO2 21     Oxyhemoglobin Venous 28 (L) 70 - 75 %    O2 Sat, Venous 28.3 (L) 70.0 - 75.0 %   Nt probnp inpatient (BNP)   Result Value Ref Range    N terminal Pro BNP Inpatient 308 0 - 900 pg/mL   CBC with platelets and differential   Result Value Ref Range    WBC Count 6.7 4.0 - 11.0 10e3/uL    RBC Count 5.46 4.40 - 5.90 10e6/uL    Hemoglobin 16.4 13.3 - 17.7 g/dL    Hematocrit 50.8 40.0 - 53.0 %    MCV 93 78 - 100 fL    MCH 30.0 26.5 - 33.0 pg    MCHC 32.3 31.5 - 36.5 g/dL    RDW 12.7 10.0 - 15.0 %    Platelet Count 179 150 - 450 10e3/uL    % Neutrophils 74 %    % Lymphocytes 15 %    % Monocytes 8 %    % Eosinophils 2 %    % Basophils 0 %    % Immature Granulocytes 1 %    NRBCs per 100 WBC 0 <1 /100    Absolute Neutrophils 4.9 1.6 - 8.3 10e3/uL    Absolute  Lymphocytes 1.0 0.8 - 5.3 10e3/uL    Absolute Monocytes 0.5 0.0 - 1.3 10e3/uL    Absolute Eosinophils 0.2 0.0 - 0.7 10e3/uL    Absolute Basophils 0.0 0.0 - 0.2 10e3/uL    Absolute Immature Granulocytes 0.1 <=0.4 10e3/uL    Absolute NRBCs 0.0 10e3/uL   Extra Blue Top Tube   Result Value Ref Range    Hold Specimen JIC    Extra Red Top Tube   Result Value Ref Range    Hold Specimen JIC    Result Value Ref Range    Troponin T, High Sensitivity 42 (H) <=22 ng/L   Symptomatic Influenza A/B, RSV, & SARS-CoV2 PCR (COVID-19) Nasopharyngeal    Specimen: Nasopharyngeal; Swab   Result Value Ref Range    Influenza A PCR Negative Negative    Influenza B PCR Negative Negative    RSV PCR Negative Negative    SARS CoV2 PCR Negative Negative   Result Value Ref Range    Troponin T, High Sensitivity 40 (H) <=22 ng/L       RADIOLOGY:  Reviewed all pertinent imaging. Please see official radiology report.  Chest CT w/o contrast   Final Result   IMPRESSION:    1.  No acute findings in the chest.         XR Chest Port 1 View   Final Result   IMPRESSION: Negative chest.          I, Lydia Rae, am serving as a scribe to document services personally performed by Dr. Ramirez based on my observation and the provider's statements to me. I, Manohar Ramirez MD attest that Lydia Rae is acting in a scribe capacity, has observed my performance of the services and has documented them in accordance with my direction.    Manohar Ramirez M.D.  Emergency Medicine  [facility] EMERGENCY DEPARTMENT  [facility] [facility] [id number] [phone]  Dept: [phone]       Manohar Ramirez MD  04/25/24 [id number]       Manohar Ramirez MD  04/26/24 0025

## 2024-04-26 NOTE — ED NOTES
"[facility] ED Handoff Report    ED Chief Complaint: Chest Pain, SOB    Patient arrives from home. Reports chest pain and shortness of breath that started 1 week ago. Patient reports that pain is in the upper part of chest and does not radiate. Patient also reports he was called today by his clinic and told \"his blood is not good\".  services call was dropped during triage.    Per chart review: Patient was admitted to this ED from 4/11/24 to 4/15/24 for NSTEMI. Presents with chest and lower abdominal pain and found to have elevated troponin with T wave inversions on EKG. BNP elevated. CT chest negative for PE or dissection. There was incidental finding of ulceration of the proximal SMA plaque at was seen on prior study done in 2020.  There is a incidental finding of advanced atherosclerotic disease with 3.9 x 3.1 cm infrarenal abdominal aortic aneurysm which has slightly increased in size. mitral leaflets appear thickened, hooded, and/or redundant, consistent with myxomatous degeneration, mild to moderate mitral regurgitation, and mod-severe to severe (3-4+) aortic regurgitation. Coronary angiogram 4/12 showed stable CAD and intervention not performed. Elevated LVEDP indicative of CHF. Patient was discharged with bumex 1 mg daily and 25mg spironolactone.     ED Diagnosis:  (R07.9) Chest pain, unspecified type  Comment:   Plan:     (I25.10) Arteriosclerotic cardiovascular disease (ASCVD)  Comment:   Plan:     (I50.32) Chronic diastolic heart failure (H)  Comment:   Plan:     (Z86.73) History of CVA (cerebrovascular accident)  Comment:   Plan:        PMH:    Past Medical History:   Diagnosis Date    Anxiety     Back pain     Cerebral vascular accident (H)     Chest pain     Chronic pain     Diarrhea     Dyslipidemia 9/1/2014    Gout     Hemorrhagic stroke (H)     Hypertension         Code Status:  Prior     Falls Risk: No Band: Not applicable    Current Living Situation/Residence: lives with a " "significant other and lives in a house     Elimination Status: Continent: Yes     Activity Level: Independent    Patients Preferred Language:  English     Needed: Yes    Vital Signs:  BP (!) 140/65   Pulse 87   Temp 99.2  F (37.3  C) (Temporal)   Resp 25   Ht 1.626 m (5' 4\")   Wt 79.4 kg (175 lb)   SpO2 94%   BMI 30.04 kg/m       Cardiac Rhythm: Sinus    Pain Score: 8/10    Is the Patient Confused:  No    Last Food or Drink: 04/25/24 at 2000    Focused Assessment:  Aox4; increased SOB over the past week; lung sounds clear; low grade fever; leg edema; unspecified, generalized chest pain 8/10; Trop 44 to 40; Hx of NSTEMI, HLD, HTN, CAD, mitral valve disorder, and DM2.    Tests Performed: Done: Labs and Imaging    Treatments Provided:  Nitroglycerin admin    Family Dynamics/Concerns: No    Family Updated On Visitor Policy: Yes    Plan of Care Communicated to Family: Yes    Who Was Updated about Plan of Care: Wife Donovan Yeung Checklist Done and Signed by Patient: Yes    Medications sent with patient: N/A    Covid: symptomatic, negative    Additional Information:     RN: Horacio Arce RN 4/25/2024 11:39 PM       "

## 2024-04-26 NOTE — PLAN OF CARE
Problem: Chest Pain  Goal: Resolution of Chest Pain Symptoms  Outcome: Progressing  Intervention: Manage Acute Chest Pain  Recent Flowsheet Documentation  Taken 4/26/2024 0421 by Lina Banks RN  Chest Pain Intervention: morphine given  Taken 4/26/2024 0215 by Lina Banks RN  Chest Pain Intervention: (MD notified. nitro paste in place)   other (see comments)   oxygen applied     Problem: Heart Failure  Goal: Stable Heart Rate and Rhythm  Outcome: Progressing     Problem: Heart Failure  Goal: Effective Oxygenation and Ventilation  Outcome: Progressing  Intervention: Promote Airway Secretion Clearance  Recent Flowsheet Documentation  Taken 4/26/2024 0421 by Lina Banks RN  Cough And Deep Breathing: done independently per patient  Activity Management: activity adjusted per tolerance  Taken 4/26/2024 0215 by Lina Banks RN  Cough And Deep Breathing: done independently per patient  Activity Management: activity adjusted per tolerance  Intervention: Optimize Oxygenation and Ventilation  Recent Flowsheet Documentation  Taken 4/26/2024 0421 by Lina Banks RN  Head of Bed (HOB) Positioning: HOB at 20-30 degrees  Taken 4/26/2024 0215 by Lina Banks RN  Head of Bed (HOB) Positioning: HOB at 20-30 degrees   Goal Outcome Evaluation:    Pt arrived to the unit around 0140.  services were used for assessments. He is alert and oriented x4. His vital signs have been stable. He was sinus rhythm with a 1st degree AVB on telemetry. He c/o pain rating an 8/10 in his chest. Dr. Dye was updated. Oxygen was applied at this time. Pt already had a nitroglycerin patch in place. Dr. Dye gave orders for PRN morphine. Pt's pain was a 1/10 after receiving morphine. Pt is an A1 with transfers. He is able to make needs known. Bed alarm is on for safety precaution. Call light is within reach.

## 2024-04-26 NOTE — ED TRIAGE NOTES
"Patient arrives from home. Reports chest pain and shortness of breath that started 1 week ago. Patient reports that pain is in the upper part of chest and does not radiate. Patient also reports he was called today by his clinic and told \"his blood is not good\".  services call was dropped during triage.      Triage Assessment (Adult)       Row Name 04/25/24 1941          Triage Assessment    Airway WDL WDL        Respiratory WDL    Respiratory WDL X;rhythm/pattern     Rhythm/Pattern, Respiratory shortness of breath        Cardiac WDL    Cardiac WDL X;chest pain        Cognitive/Neuro/Behavioral WDL    Cognitive/Neuro/Behavioral WDL WDL                     "

## 2024-04-26 NOTE — UTILIZATION REVIEW
Admission Status; Secondary Review Determination   Under the authority of the Utilization Management Committee, the utilization review process indicated a secondary review on Sedrick Murray. The review outcome is based on review of the medical records, discussions with staff, and applying clinical experience noted on the date of the review.   (x) Inpatient Status Appropriate - This patient's medical care is consistent with medical management for inpatient care and reasonable inpatient medical practice.     RATIONALE FOR DETERMINATION   Sedrick Murray is a 74 yr old male with hx hemorrhagic CVA, CAD, Diastolic heart failure who had a recent hospitalization 4/11-4/15 with angiogram revealing unchanged findings of 80% stenosis of distal LAD lesions and massively aneurysmal RCA.  Treated medically and presents back.  Cardiology following.  Further medical adjustments in progress however patient still needing IV morphine every 3 hours due to ongoing pain.  May need further angio/PCI pending course.    At the time of admission with the information available to the attending physician more than 2 nights Hospital complex care was anticipated, based on patient risk of adverse outcome if treated as outpatient and complex care required. Inpatient admission is appropriate based on the Medicare guidelines.   The information on this document is developed by the utilization review team in order for the business office to ensure compliance. This only denotes the appropriateness of proper admission status and does not reflect the quality of care rendered.   The definitions of Inpatient Status and Observation Status used in making the determination above are those provided in the CMS Coverage Manual, Chapter 1 and Chapter 6, section 70.4.   Sincerely,   Verónica Anne MD  Utilization Review  Physician Advisor  [facility]

## 2024-04-26 NOTE — H&P
"[facility]    History and Physical - Hospitalist Service       Date of Admission:  4/25/2024    Assessment & Plan      Sedrick Murray is a 74 year old male admitted on 4/25/2024. He has history of NSTEMI, HLD, HTN, CAD, mitral valve disorder. Recent hospitalization for NSTEMI (4/11-15). Pt presents with persistent sob and cp since discharge.    CP and sob  CAD  -recent extensive cardiac workup  -trop mildly elevated, BNP wnl  -admitted to [facility] for cp r/o ami  -consult card    H/o hemorrhagic stroke   - continue home asa and statin     Gout  -PTA med        Diet:  cardiac  DVT Prophylaxis: Enoxaparin (Lovenox) SQ  Eugene Catheter: Not present  Lines: None     Cardiac Monitoring: None  Code Status:  full    Clinically Significant Risk Factors Present on Admission                # Drug Induced Platelet Defect: home medication list includes an antiplatelet medication   # Hypertension: Noted on problem list      # Obesity: Estimated body mass index is 30.04 kg/m  as calculated from the following:    Height as of this encounter: 1.626 m (5' 4\").    Weight as of this encounter: 79.4 kg (175 lb).              Disposition Plan     Medically Ready for Discharge: Anticipated Tomorrow           Archie Dye MD  Hospitalist Service  [facility]  Securely message with [employer] (more info)  Text page via [employer]rig[employer] Paging/Directory     ______________________________________________________________________    Chief Complaint   Cp, sob    History is obtained from the patient and emergency department physician    History of Present Illness   Sedrick Murray is a 74 year old male with a pertinent history of NSTEMI, HLD, HTN, CAD, mitral valve disorder, and DM2 who presents to this ED via walk-in for evaluation of chest pain and shortness of breath.     Patient reports chest pain and tightness and palpitations that started right after discharge from last hospital visit on 4/15/24. He endorses " shortness of breath, leg swelling, fever, and pain to feet as well. Otherwise, patient denies any cough, vomiting, diarrhea, abdominal pain, or chills. No other complaints at this time.       Past Medical History    Past Medical History:   Diagnosis Date    Anxiety     Back pain     Cerebral vascular accident (H)     Chest pain     Chronic pain     Diarrhea     Dyslipidemia 9/1/2014    Gout     Hemorrhagic stroke (H)     Hypertension        Past Surgical History   Past Surgical History:   Procedure Laterality Date    CHOLECYSTECTOMY      CV CORONARY ANGIOGRAM N/A 4/12/2024    Procedure: Coronary Angiogram;  Surgeon: Phu Delgado MD;  Location: [facility] CV    CV LEFT HEART CATH N/A 4/12/2024    Procedure: Left Heart Catheterization;  Surgeon: Phu Delgado MD;  Location: [facility] CV    IR MISCELLANEOUS PROCEDURE  3/9/2013    IR MISCELLANEOUS PROCEDURE  3/9/2013       Prior to Admission Medications   Prior to Admission Medications   Prescriptions Last Dose Informant Patient Reported? Taking?   CHOLECALCIFEROL 25 MCG (1000 UT) tablet   Yes No   Sig: Take 25 mcg by mouth daily   acetaminophen (TYLENOL) 650 MG CR tablet   Yes No   Sig: Take 650 mg by mouth every 8 hours as needed   allopurinol (ZYLOPRIM) 300 MG tablet   Yes No   Sig: Take 300 mg by mouth 2 times daily   amLODIPine (NORVASC) 10 MG tablet   Yes No   Sig: Take 10 mg by mouth daily   aspirin 81 MG EC tablet   Yes No   Sig: Take 81 mg by mouth daily   bumetanide (BUMEX) 1 MG tablet   No No   Sig: Take 1 tablet (1 mg) by mouth daily   diclofenac (VOLTAREN) 1 % topical gel   Yes No   Sig: Apply 4 g topically 4 times daily as needed   pantoprazole (PROTONIX) 40 MG EC tablet   No No   Sig: Take 1 tablet (40 mg) by mouth every morning (before breakfast)   rosuvastatin (CRESTOR) 20 MG tablet   Yes No   Sig: Take 20 mg by mouth at bedtime   spironolactone (ALDACTONE) 25 MG tablet   No No   Sig: Take 1 tablet (25 mg) by mouth daily    traZODone (DESYREL) 150 MG tablet   Yes No   Sig: Take 150 mg by mouth nightly as needed      Facility-Administered Medications: None        Allergies   Allergies   Allergen Reactions    Dilaudid [Hydromorphone] Unknown    Phenytoin Hives and Itching        Physical Exam   Vital Signs: Temp: 99.2  F (37.3  C) Temp src: Temporal BP: (!) 145/67 Pulse: 87   Resp: (!) 31 SpO2: 95 %      Weight: 175 lbs 0 oz    General.  Awake not in acute distress.  HEENT.  Pupils equal round react to light, anicteric, EOM intact.  Neck supple no JVD.  CVS regular rhythm no murmur gallops.  Lungs.  Clear to auscultation bilateral no wheezing or rales.  Abdomen.  Soft nontender bowel sounds present.  Extremities.  No edema no calf tenderness.  Neurological.  Awake and alert. No focal deficit.  Skin no rash. No pallor.  Psych. Normal mood.      Medical Decision Making       65 MINUTES SPENT BY ME on the date of service doing chart review, history, exam, documentation & further activities per the note.      Data     I have personally reviewed the following data over the past 24 hrs:    6.7  \   16.4   / 179     138 97 (L) 22.1 /  121 (H)   3.5 31 (H) 1.08 \     Trop: 39 (H) BNP: 308       Imaging results reviewed over the past 24 hrs:   Recent Results (from the past 24 hour(s))   XR Chest Port 1 View    Narrative    EXAM: XR CHEST PORT 1 VIEW  LOCATION: [facility]  DATE: 4/25/2024    INDICATION: Dyspnea  COMPARISON: 04/14/2024      Impression    IMPRESSION: Negative chest.   Chest CT w/o contrast    Narrative    EXAM: CT CHEST W/O CONTRAST  LOCATION: [facility]  DATE: 4/25/2024    INDICATION: Dyspnea.  COMPARISON: CTA chest 04/11/2024  TECHNIQUE: CT chest without IV contrast. Multiplanar reformats were obtained. Dose reduction techniques were used.  CONTRAST: None.    FINDINGS:   LUNGS AND PLEURA: No focal airspace disease. No pleural effusion or pneumothorax.    MEDIASTINUM/AXILLAE: No  lymphadenopathy. Mild cardiomegaly. No pericardial effusion. Moderate aortic calcifications.    CORONARY ARTERY CALCIFICATION: Severe.    UPPER ABDOMEN: Status post cholecystectomy. Right renal upper pole 1.0 cm cyst; no follow-up indicated.    MUSCULOSKELETAL: Multilevel degenerative changes of the spine.      Impression    IMPRESSION:   1.  No acute findings in the chest.

## 2024-04-27 ENCOUNTER — APPOINTMENT (OUTPATIENT)
Dept: CARDIOLOGY | Facility: HOSPITAL | Age: 74
DRG: 313 | End: 2024-04-27
Attending: STUDENT IN AN ORGANIZED HEALTH CARE EDUCATION/TRAINING PROGRAM
Payer: COMMERCIAL

## 2024-04-27 LAB
ANION GAP SERPL CALCULATED.3IONS-SCNC: 11 MMOL/L (ref 7–15)
ATRIAL RATE - MUSE: 70 BPM
BUN SERPL-MCNC: 30.6 MG/DL (ref 8–23)
CALCIUM SERPL-MCNC: 9.5 MG/DL (ref 8.8–10.2)
CHLORIDE SERPL-SCNC: 98 MMOL/L (ref 98–107)
CREAT SERPL-MCNC: 1.2 MG/DL (ref 0.67–1.17)
DEPRECATED HCO3 PLAS-SCNC: 28 MMOL/L (ref 22–29)
DIASTOLIC BLOOD PRESSURE - MUSE: NORMAL MMHG
EGFRCR SERPLBLD CKD-EPI 2021: 64 ML/MIN/1.73M2
GLUCOSE BLDC GLUCOMTR-MCNC: 120 MG/DL (ref 70–99)
GLUCOSE BLDC GLUCOMTR-MCNC: 134 MG/DL (ref 70–99)
GLUCOSE BLDC GLUCOMTR-MCNC: 145 MG/DL (ref 70–99)
GLUCOSE BLDC GLUCOMTR-MCNC: 162 MG/DL (ref 70–99)
GLUCOSE BLDC GLUCOMTR-MCNC: 167 MG/DL (ref 70–99)
GLUCOSE BLDC GLUCOMTR-MCNC: 235 MG/DL (ref 70–99)
GLUCOSE BLDC GLUCOMTR-MCNC: 265 MG/DL (ref 70–99)
GLUCOSE SERPL-MCNC: 184 MG/DL (ref 70–99)
INTERPRETATION ECG - MUSE: NORMAL
LVEF ECHO: NORMAL
P AXIS - MUSE: 9 DEGREES
POTASSIUM SERPL-SCNC: 4 MMOL/L (ref 3.4–5.3)
PR INTERVAL - MUSE: 192 MS
QRS DURATION - MUSE: 102 MS
QT - MUSE: 408 MS
QTC - MUSE: 440 MS
R AXIS - MUSE: -10 DEGREES
SODIUM SERPL-SCNC: 137 MMOL/L (ref 135–145)
SYSTOLIC BLOOD PRESSURE - MUSE: NORMAL MMHG
T AXIS - MUSE: 38 DEGREES
TROPONIN T SERPL HS-MCNC: 40 NG/L
VENTRICULAR RATE- MUSE: 70 BPM

## 2024-04-27 PROCEDURE — 250N000011 HC RX IP 250 OP 636: Performed by: INTERNAL MEDICINE

## 2024-04-27 PROCEDURE — 93306 TTE W/DOPPLER COMPLETE: CPT | Mod: 26 | Performed by: INTERNAL MEDICINE

## 2024-04-27 PROCEDURE — 36415 COLL VENOUS BLD VENIPUNCTURE: CPT | Performed by: STUDENT IN AN ORGANIZED HEALTH CARE EDUCATION/TRAINING PROGRAM

## 2024-04-27 PROCEDURE — 99232 SBSQ HOSP IP/OBS MODERATE 35: CPT | Performed by: STUDENT IN AN ORGANIZED HEALTH CARE EDUCATION/TRAINING PROGRAM

## 2024-04-27 PROCEDURE — 250N000013 HC RX MED GY IP 250 OP 250 PS 637: Performed by: INTERNAL MEDICINE

## 2024-04-27 PROCEDURE — 250N000013 HC RX MED GY IP 250 OP 250 PS 637: Performed by: STUDENT IN AN ORGANIZED HEALTH CARE EDUCATION/TRAINING PROGRAM

## 2024-04-27 PROCEDURE — 93306 TTE W/DOPPLER COMPLETE: CPT

## 2024-04-27 PROCEDURE — 84484 ASSAY OF TROPONIN QUANT: CPT | Performed by: STUDENT IN AN ORGANIZED HEALTH CARE EDUCATION/TRAINING PROGRAM

## 2024-04-27 PROCEDURE — 120N000004 HC R&B MS OVERFLOW

## 2024-04-27 PROCEDURE — 93010 ELECTROCARDIOGRAM REPORT: CPT | Performed by: STUDENT IN AN ORGANIZED HEALTH CARE EDUCATION/TRAINING PROGRAM

## 2024-04-27 PROCEDURE — 93005 ELECTROCARDIOGRAM TRACING: CPT | Performed by: STUDENT IN AN ORGANIZED HEALTH CARE EDUCATION/TRAINING PROGRAM

## 2024-04-27 PROCEDURE — 80048 BASIC METABOLIC PNL TOTAL CA: CPT | Performed by: STUDENT IN AN ORGANIZED HEALTH CARE EDUCATION/TRAINING PROGRAM

## 2024-04-27 PROCEDURE — 93005 ELECTROCARDIOGRAM TRACING: CPT

## 2024-04-27 RX ORDER — MAGNESIUM HYDROXIDE/ALUMINUM HYDROXICE/SIMETHICONE 120; 1200; 1200 MG/30ML; MG/30ML; MG/30ML
15 SUSPENSION ORAL
Status: DISCONTINUED | OUTPATIENT
Start: 2024-04-27 | End: 2024-04-29 | Stop reason: HOSPADM

## 2024-04-27 RX ORDER — ISOSORBIDE MONONITRATE 30 MG/1
30 TABLET, EXTENDED RELEASE ORAL DAILY
Status: DISCONTINUED | OUTPATIENT
Start: 2024-04-28 | End: 2024-04-28

## 2024-04-27 RX ORDER — CALCIUM CARBONATE 500 MG/1
500 TABLET, CHEWABLE ORAL DAILY PRN
Status: DISCONTINUED | OUTPATIENT
Start: 2024-04-27 | End: 2024-04-29 | Stop reason: HOSPADM

## 2024-04-27 RX ADMIN — SPIRONOLACTONE 25 MG: 25 TABLET, FILM COATED ORAL at 08:31

## 2024-04-27 RX ADMIN — ALUMINUM HYDROXIDE, MAGNESIUM HYDROXIDE, AND DIMETHICONE 30 ML: 200; 20; 200 SUSPENSION ORAL at 18:47

## 2024-04-27 RX ADMIN — ISOSORBIDE MONONITRATE 15 MG: 30 TABLET, EXTENDED RELEASE ORAL at 08:30

## 2024-04-27 RX ADMIN — ALLOPURINOL 300 MG: 300 TABLET ORAL at 22:15

## 2024-04-27 RX ADMIN — ROSUVASTATIN CALCIUM 40 MG: 40 TABLET, COATED ORAL at 08:30

## 2024-04-27 RX ADMIN — ALLOPURINOL 300 MG: 300 TABLET ORAL at 08:31

## 2024-04-27 RX ADMIN — PANTOPRAZOLE SODIUM 40 MG: 40 TABLET, DELAYED RELEASE ORAL at 06:32

## 2024-04-27 RX ADMIN — ENOXAPARIN SODIUM 40 MG: 40 INJECTION SUBCUTANEOUS at 08:31

## 2024-04-27 RX ADMIN — INSULIN ASPART 2 UNITS: 100 INJECTION, SOLUTION INTRAVENOUS; SUBCUTANEOUS at 18:29

## 2024-04-27 RX ADMIN — NITROGLYCERIN 0.4 MG: 0.4 TABLET, ORALLY DISINTEGRATING SUBLINGUAL at 13:19

## 2024-04-27 RX ADMIN — ASPIRIN 81 MG: 81 TABLET, COATED ORAL at 08:30

## 2024-04-27 RX ADMIN — INSULIN ASPART 1 UNITS: 100 INJECTION, SOLUTION INTRAVENOUS; SUBCUTANEOUS at 22:15

## 2024-04-27 RX ADMIN — INSULIN ASPART 1 UNITS: 100 INJECTION, SOLUTION INTRAVENOUS; SUBCUTANEOUS at 02:15

## 2024-04-27 RX ADMIN — AMLODIPINE BESYLATE 10 MG: 5 TABLET ORAL at 08:31

## 2024-04-27 RX ADMIN — NITROGLYCERIN 0.4 MG: 0.4 TABLET, ORALLY DISINTEGRATING SUBLINGUAL at 13:11

## 2024-04-27 RX ADMIN — METOPROLOL SUCCINATE 25 MG: 25 TABLET, EXTENDED RELEASE ORAL at 08:31

## 2024-04-27 RX ADMIN — BUMETANIDE 1 MG: 1 TABLET ORAL at 08:31

## 2024-04-27 RX ADMIN — INSULIN ASPART 1 UNITS: 100 INJECTION, SOLUTION INTRAVENOUS; SUBCUTANEOUS at 14:16

## 2024-04-27 RX ADMIN — INSULIN ASPART 1 UNITS: 100 INJECTION, SOLUTION INTRAVENOUS; SUBCUTANEOUS at 09:48

## 2024-04-27 ASSESSMENT — ACTIVITIES OF DAILY LIVING (ADL)
ADLS_ACUITY_SCORE: 38
ADLS_ACUITY_SCORE: 35
ADLS_ACUITY_SCORE: 38
ADLS_ACUITY_SCORE: 38
ADLS_ACUITY_SCORE: 35
ADLS_ACUITY_SCORE: 38
ADLS_ACUITY_SCORE: 35
ADLS_ACUITY_SCORE: 38
ADLS_ACUITY_SCORE: 35
ADLS_ACUITY_SCORE: 35
ADLS_ACUITY_SCORE: 38

## 2024-04-27 NOTE — PROGRESS NOTES
[facility]   [address], [place] [address]   [email or web address]   OFFICE: [phone]     CARDIOLOGY INPATIENT PROGRESS NOTE     Impression and Plan     Assessment/Plan:  Mr. Sedrick Murray is a 74 year old male with CAD managed medically, HFpEF, HTN, mod-severe AI,  and prior hemorrhagic CVA in 2017 who presented to the hospital with chest pain.       Chest pain    - Known distal LAD stenosis, appears similar to description of OSH angiogram from 5/2023.     - Increase imdur to 30mg.     - Continue metoprolol succinate at 25mg qdaily   - Unclear to me that this distal stenosis is the culprit for new symptoms given stability of the lesion.     - If pain does not improve could consider PCI, though this was thought to be of low benefit and high risk (due to hx of hemorrhagic CVA) by his primary cardiologist at [facility].      Aortic regurgitation, mod to severe    - Some evidence of holodiastolic reversal on echo, however normal LV dimensions and LVEF   - Would recommend follow up with primary cardiologist.       Chronic HFpEF   - Euvolemic on exam   - BNP improved compared to 2 weeks ago   - Continue PO diuretic     Will sign off  He should follow up with his primary cardiologist in 4-6 weeks     Primary Cardiologist: Dr. Marie with [facility]    Subjective     Sedrick is feeling much better.  He is happy his pain has improved.    Cardiac Diagnostics     ECG: Personally reviewed and interpreted: 4/25/2024  NSR, sinus arrhythmia  Nonspecific STT     Most recent:  Echocardiogram (results reviewed): 4/11/2024  Interpretation Summary     The left ventricle is normal in size.  There is mild concentric left ventricular hypertrophy.  The visual ejection fraction is 55-60%.  No regional wall motion abnormalities noted.  The right ventricle is normal size.  The right ventricular systolic function is normal.  The mitral valve leaflets are mildly thickened.  The mitral leaflets  "appear thickened, hooded, and/or redundant, consistent  with myxomatous degeneration.  Redundant elongated chordae are noted.  There is mild to moderate (1-2+) mitral regurgitation.  Cannot see the individual leaflets of the AV on this study.  Trivial pericardial effusion  Sinus rhythm was noted.  There is no comparison study available.     Cardiac Cath (results reviewed): 4/12/2024    Dist LAD lesion is 80% stenosed.    Prox LAD to Mid LAD lesion is 20% stenosed.    Mid LAD lesion is 50% stenosed.    1st Diag lesion is 30% stenosed.    Mid Cx to Dist Cx lesion is 30% stenosed.    RPDA lesion is 40% stenosed.    Prox RCA to Dist RCA lesion is 15% stenosed.     1.  Left main large, no stenoses.  2.  LAD has diffuse lumen irregularity proximal to mid segment; moderate stenoses mid segment and 70 to 80% segment apical LAD.    3.  Left circumflex system appears normal.  4.  Huge aneurysmal right coronary artery with diffuse luminal irregularity.  Mild plaquing proximal PDA and apical PDA segment.  No apparent flow-limiting stenoses.  5.  No apparent change since angiogram in January at [facility].  No  \"unstable plaque\".  Probable demand ischemia due to fixed coronary disease and severe hypertension.  Patient suboptimal candidate for stenting due to history of CNS bleed and anticipated requirement of dual antiplatelet therapy post stent.  Could consider stand-alone PTCA of apical LAD segment if symptoms persist or recur.        Medical History  Surgical History Family History Social History   Past Medical History:   Diagnosis Date    Anxiety     Back pain     Cerebral vascular accident (H)     Chest pain     Chronic pain     Diarrhea     Dyslipidemia 9/1/2014    Gout     Hemorrhagic stroke (H)     Hypertension      Past Surgical History:   Procedure Laterality Date    CHOLECYSTECTOMY      CV CORONARY ANGIOGRAM N/A 4/12/2024    Procedure: Coronary Angiogram;  Surgeon: Phu Delgado MD;  Location: [facility] " "LAB CV    CV LEFT HEART CATH N/A 4/12/2024    Procedure: Left Heart Catheterization;  Surgeon: Phu Delgado MD;  Location: [facility] LAB CV    IR MISCELLANEOUS PROCEDURE  3/9/2013    IR MISCELLANEOUS PROCEDURE  3/9/2013     Family History   Problem Relation Age of Onset    Cerebrovascular Disease Mother            Social History     Socioeconomic History    Marital status:      Spouse name: Not on file    Number of children: Not on file    Years of education: Not on file    Highest education level: Not on file   Occupational History    Not on file   Tobacco Use    Smoking status: Never    Smokeless tobacco: Not on file   Substance and Sexual Activity    Alcohol use: Yes    Drug use: No    Sexual activity: Not on file   Other Topics Concern    Not on file   Social History Narrative    Patient presented to the ED with his wife 04/25/17       Social Determinants of Health     Financial Resource Strain: Not on file   Food Insecurity: Not on file   Transportation Needs: Not on file   Physical Activity: Not on file   Stress: Not on file   Social Connections: Unknown (4/19/2023)    [facility] [email or web address] [email or web address] [email or web address]    Social Connections     Frequency of Communication with Friends and Family: Not on file   Interpersonal Safety: Not on file   Housing Stability: Not on file             Physical Examination   VITALS: BP (!) 140/65 (BP Location: Right arm)   Pulse 72   Temp 97.7  F (36.5  C) (Oral)   Resp 18   Ht 1.626 m (5' 4\")   Wt 77.5 kg (170 lb 14.4 oz)   SpO2 96%   BMI 29.33 kg/m    BMI: Body mass index is 29.33 kg/m .  Wt Readings from Last 3 Encounters:   04/26/24 77.5 kg (170 lb 14.4 oz)   04/15/24 79.7 kg (175 lb 9.6 oz)   09/02/22 75.6 kg (166 lb 10.7 oz)       Intake/Output Summary (Last 24 hours) at 4/27/2024 1005  Last data filed at 4/27/2024 0644  Gross per 24 hour   Intake 380 ml   Output 500 ml   Net -120 ml "       General: pleasant male. No acute distress.   HENT: external ears normal. Nares patent. Mucous membranes moist.  Neck: No JVD  Lungs: clear to auscultation  COR:  regular rate and rhythm, No murmurs, rubs, or gallops  Abd: nondistended, BS present  Extrem: No edema         Non-cardiac Imaging Studies Reviewed             Lab Results Reviewed    Chemistry/lipid CBC Cardiac Enzymes/BNP/TSH/INR   Recent Labs   Lab Test 04/11/24  1215   CHOL 210*   HDL 80   LDL 61   TRIG 343*     Recent Labs   Lab Test 04/11/24  1215 05/29/17  0623 03/17/17  0447   LDL 61 73 120     Recent Labs   Lab Test 04/27/24  0712 04/26/24  1305 04/25/24 2026   NA  --   --  138   POTASSIUM  --   --  3.5   CHLORIDE  --   --  97*   CO2  --   --  31*   *   < > 121*   BUN  --   --  22.1   CR  --   --  1.08   GFRESTIMATED  --   --  72   SAHARA  --   --  9.5    < > = values in this interval not displayed.     Recent Labs   Lab Test 04/25/24  2026 04/15/24  0456 04/14/24  0421   CR 1.08 1.24* 1.38*     Recent Labs   Lab Test 04/25/24 2026 03/17/17 0447   A1C 7.4* 5.8          Recent Labs   Lab Test 04/25/24 2026   WBC 6.7   HGB 16.4   HCT 50.8   MCV 93        Recent Labs   Lab Test 04/25/24 2026 04/14/24  0421 04/12/24  0555   HGB 16.4 16.5 16.0    Recent Labs   Lab Test 05/06/20  0520 05/05/20 2148   TROPONINI 0.03 0.03     Recent Labs   Lab Test 04/25/24 2026 04/11/24  1215   NTBNPI 308 1,801*     Recent Labs   Lab Test 05/05/20 2148   TSH 1.06     Recent Labs   Lab Test 04/11/24 1216 05/05/20 2148   INR 0.99 0.98           Current Inpatient Scheduled Medications   Scheduled Meds:  Current Facility-Administered Medications   Medication Dose Route Frequency Provider Last Rate Last Admin    allopurinol (ZYLOPRIM) tablet 300 mg  300 mg Oral BID Archie Dye MD   300 mg at 04/27/24 0831    amLODIPine (NORVASC) tablet 10 mg  10 mg Oral Daily Archie Dye MD   10 mg at 04/27/24 0831    aspirin EC tablet 81 mg  81 mg Oral Daily  Archie Dye MD   81 mg at 04/27/24 0830    bumetanide (BUMEX) tablet 1 mg  1 mg Oral Daily Archie Dye MD   1 mg at 04/27/24 0831    enoxaparin ANTICOAGULANT (LOVENOX) injection 40 mg  40 mg Subcutaneous Q24H Archie Dye MD   40 mg at 04/27/24 0831    insulin aspart (NovoLOG) injection (RAPID ACTING)  1-4 Units Subcutaneous Q4H Daysi Alcala MD   1 Units at 04/27/24 0948    isosorbide mononitrate (IMDUR) 24 hr half-tab 15 mg  15 mg Oral Daily Cain, Marcel, DO   15 mg at 04/27/24 0830    metoprolol succinate ER (TOPROL XL) 24 hr tablet 25 mg  25 mg Oral Daily Cain, Marcel, DO   25 mg at 04/27/24 0831    pantoprazole (PROTONIX) EC tablet 40 mg  40 mg Oral QAM AC Archie Dye MD   40 mg at 04/27/24 0632    rosuvastatin (CRESTOR) tablet 40 mg  40 mg Oral Daily Archie Dye MD   40 mg at 04/27/24 0830    spironolactone (ALDACTONE) tablet 25 mg  25 mg Oral Daily Archie Dye MD   25 mg at 04/27/24 0831     Continuous Infusions:  Current Facility-Administered Medications   Medication Dose Route Frequency Provider Last Rate Last Admin       No current outpatient medications on file.          Medications Prior to Admission   Prior to Admission medications    Medication Sig Start Date End Date Taking? Authorizing Provider   acetaminophen (TYLENOL) 650 MG CR tablet Take 650 mg by mouth every 8 hours as needed   Yes Reported, Patient   allopurinol (ZYLOPRIM) 300 MG tablet Take 300 mg by mouth 2 times daily   Yes Reported, Patient   amLODIPine (NORVASC) 10 MG tablet Take 10 mg by mouth daily 1/8/24  Yes Reported, Patient   aspirin 81 MG EC tablet Take 81 mg by mouth daily 1/8/24  Yes Reported, Patient   bumetanide (BUMEX) 1 MG tablet Take 1 tablet (1 mg) by mouth daily 4/16/24  Yes Krish Linn DO   diclofenac (VOLTAREN) 1 % topical gel Apply 4 g topically 4 times daily as needed   Yes Reported, Patient   pantoprazole (PROTONIX) 40 MG EC tablet Take 1 tablet (40 mg) by mouth every morning (before  "breakfast) 4/16/24  Yes Krish Linn DO   rosuvastatin (CRESTOR) 40 MG tablet Take 40 mg by mouth daily 4/2/24  Yes Reported, Patient   spironolactone (ALDACTONE) 25 MG tablet Take 1 tablet (25 mg) by mouth daily 4/16/24  Yes Krish Linn DO   traZODone (DESYREL) 150 MG tablet Take 150 mg by mouth nightly as needed   Yes Reported, Patient          Marcel Barlow DO [facility]  Non-invasive Cardiologist  [facility]      Clinically Significant Risk Factors Present on Admission                # Drug Induced Platelet Defect: home medication list includes an antiplatelet medication   # Hypertension: Noted on problem list     # DMII: A1C = 7.4 % (Ref range: <5.7 %) within past 6 months    # Overweight: Estimated body mass index is 29.33 kg/m  as calculated from the following:    Height as of this encounter: 1.626 m (5' 4\").    Weight as of this encounter: 77.5 kg (170 lb 14.4 oz).               "

## 2024-04-27 NOTE — PROGRESS NOTES
Pt is alert ad oriented x 4, complained of pain at the epigastric region.that is non radiating.  Tared 5/10. House has been paged pending their response. Pt is Hmong speaking, family in the room to translate.

## 2024-04-27 NOTE — PROGRESS NOTES
"Time of event: 7:13 PM April 26, 2024    Description of event:  Paged by RN for patient complaining of shortness of breath. Briefly, patient is here for worsening chest pain. Was recently admitted for CHF and chest pain, underwent coronary angiogram without clear intervenable culprit lesion.     This hospitalization, for chest pin, medical management recommended. Cardiology consulted and following. Started on Imdur, metoprolol succinate, and to continue on PO diuretic.     I went to assess patient and found him resting in bed, in no acute distress. Complaining of shortness of breath, saying it's hard for him to get oxygen in. He states he has chest pain, which he reports has been nearly constant since admission. On exam, patient with some mild expiratory wheezing. Saturating well.     /63   Pulse 71   Temp 97.6  F (36.4  C)   Resp 20   Ht 1.626 m (5' 4\")   Wt 77.5 kg (170 lb 14.4 oz)   SpO2 98%   BMI 29.33 kg/m    GENERAL: healthy, alert and no distress  RESP: speaking in full sentences, mild expiratory wheezing more in left lung; no crackles appreciated  CV: extremities well perfused  ABDOMEN: soft, nontender  MS: no gross musculoskeletal defects noted, no edema  PSYCH: mentation appears normal      Plan:  Patient remains vitally stable. Was saturating in high 90s on 2L nasal cannula upon assessment. Some wheezing present. Receiving PRN Morphine for pain. Advised to also trial Nitroglycerin as long as sustaining adequate BPs. Will add a PRN inhaler if symptoms not improving.     Advised to monitor for any vital changes or changes in hemodynamics.     Discussed with: bedside nurse    Lydia Dorado, DO    "

## 2024-04-27 NOTE — PLAN OF CARE
"  Problem: Adult Inpatient Plan of Care  Goal: Plan of Care Review  Description: The Plan of Care Review/Shift note should be completed every shift.  The Outcome Evaluation is a brief statement about your assessment that the patient is improving, declining, or no change.  This information will be displayed automatically on your shift  note.  Outcome: Progressing  Flowsheets (Taken 4/27/2024 1428)  Plan of Care Reviewed With:   patient   family  Goal: Patient-Specific Goal (Individualized)  Description: You can add care plan individualizations to a care plan. Examples of Individualization might be:  \"Parent requests to be called daily at 9am for status\", \"I have a hard time hearing out of my right ear\", or \"Do not touch me to wake me up as it startles  me\".  Outcome: Progressing  Goal: Absence of Hospital-Acquired Illness or Injury  Outcome: Progressing  Intervention: Identify and Manage Fall Risk  Recent Flowsheet Documentation  Taken 4/27/2024 0800 by Abdelrahman Hallman RN  Safety Promotion/Fall Prevention:   activity supervised   treat underlying cause   toileting scheduled   safety round/check completed   patient and family education   nonskid shoes/slippers when out of bed   lighting adjusted   clutter free environment maintained  Intervention: Prevent and Manage VTE (Venous Thromboembolism) Risk  Recent Flowsheet Documentation  Taken 4/27/2024 0800 by Abdelrahman Hallman RN  VTE Prevention/Management: SCDs (sequential compression devices) on  Intervention: Prevent Infection  Recent Flowsheet Documentation  Taken 4/27/2024 0800 by Abdelrahman Hallman RN  Infection Prevention:   environmental surveillance performed   equipment surfaces disinfected   hand hygiene promoted   personal protective equipment utilized   rest/sleep promoted   single patient room provided  Goal: Optimal Comfort and Wellbeing  Outcome: Progressing  Intervention: Monitor Pain and Promote Comfort  Recent Flowsheet Documentation  Taken 4/27/2024 1400 by " Abdelrahman Hallman RN  Pain Management Interventions: medication (see MAR)  Taken 4/27/2024 1325 by Abdelrahman Hallman RN  Pain Management Interventions: medication (see MAR)  Taken 4/27/2024 1319 by Abdelrahman Hallman RN  Pain Management Interventions: medication (see MAR)  Taken 4/27/2024 1316 by Abdelrahman Hallman RN  Pain Management Interventions: medication (see MAR)  Taken 4/27/2024 1311 by Abdelrahman Hallman RN  Pain Management Interventions: medication (see MAR)  Taken 4/27/2024 1258 by Abdelrahman Hallman RN  Pain Management Interventions:   medication (see MAR)   MD notified (comment)  Goal: Readiness for Transition of Care  Outcome: Progressing     Problem: Chest Pain  Goal: Resolution of Chest Pain Symptoms  Outcome: Progressing  Intervention: Manage Acute Chest Pain  Recent Flowsheet Documentation  Taken 4/27/2024 1258 by Abdelrahman Hallman RN  Chest Pain Intervention:   nitroglycerin SL given   cardiac monitoring continued   cardiac biomarkers drawn   12-lead ECG obtained   activity minimized     Problem: Heart Failure  Goal: Optimal Coping  Outcome: Progressing  Goal: Optimal Cardiac Output  Outcome: Progressing  Goal: Stable Heart Rate and Rhythm  Outcome: Progressing  Goal: Optimal Functional Ability  Outcome: Progressing  Goal: Fluid and Electrolyte Balance  Outcome: Progressing  Goal: Improved Oral Intake  Outcome: Progressing  Goal: Effective Oxygenation and Ventilation  Outcome: Progressing  Intervention: Promote Airway Secretion Clearance  Recent Flowsheet Documentation  Taken 4/27/2024 1258 by Abdelrahman Hallman RN  Cough And Deep Breathing: done independently per patient  Taken 4/27/2024 1200 by Abdelrahman Hallman RN  Cough And Deep Breathing: done independently per patient  Taken 4/27/2024 0800 by Abdelrahman Hallman RN  Cough And Deep Breathing: done independently per patient  Goal: Effective Breathing Pattern During Sleep  Outcome: Progressing  Intervention: Monitor and Manage Obstructive Sleep Apnea  Recent Flowsheet  "Documentation  Taken 4/27/2024 0800 by Abdelrahman Hallman RN  Medication Review/Management: medications reviewed     Problem: Risk for Delirium  Goal: Optimal Coping  Outcome: Progressing  Goal: Improved Behavioral Control  Outcome: Progressing  Intervention: Minimize Safety Risk  Recent Flowsheet Documentation  Taken 4/27/2024 0800 by Abdelrahman Hallman RN  Communication Enhancement Strategies: call light answered in person  Enhanced Safety Measures:   pain management   room near unit station  Goal: Improved Attention and Thought Clarity  Outcome: Progressing  Intervention: Maximize Cognitive Function  Recent Flowsheet Documentation  Taken 4/27/2024 0800 by Abdelrahman Hallman RN  Sensory Stimulation Regulation: care clustered  Reorientation Measures:   clock in view   calendar in view  Goal: Improved Sleep  Outcome: Progressing     Goal Outcome Evaluation:      Plan of Care Reviewed With: patient, family    Pt A/O x 4. Vitally stable & successfully weaned to RA this shift. Tele shows NSR. Pt was comfortable and denying any ischemic S/S until 1300 hours when he began endorsing 4/10 mid-sternal angina, described as \"squeezing\" per pt. Pain was non-radiating and nothing precipitated its onset per pt report. MD notified & 2 doses of SL nitroglycerin provided without improvement - morphine held as pt stated he does not find it helpful. Angina began to improve around 1400 hours without further intervention - pt now rating it 1/10. 12 lead obtained & appears improved with resolution of ischemic changes. Trops currently pending. Will continue to monitor.     Abdelrahman Hallman RN on 4/27/2024 at 2:35 PM               "

## 2024-04-27 NOTE — CARE PLAN
Heart Failure Care Map  GOALS TO BE MET BEFORE DISCHARGE:    1. Decrease congestion and/or edema with diuretic therapy to achieve near optimal volume status.     Dyspnea improved: No, further care required to meet this goal. Please explain still has SOB at rest, felt better after albuterol inhaler given   Edema improved: Yes, satisfactory for discharge.        Last 24 hour I/O:   Intake/Output Summary (Last 24 hours) at 4/26/2024 2234  Last data filed at 4/26/2024 2130  Gross per 24 hour   Intake 500 ml   Output 550 ml   Net -50 ml           Net I/O and Weights since admission:   03/27 2300 - 04/26 2259  In: 500 [P.O.:500]  Out: 550 [Urine:450]  Net: -50     Vitals:    04/25/24 1940 04/26/24 0153   Weight: 79.4 kg (175 lb) 77.5 kg (170 lb 14.4 oz)       2.  O2 sats > 90% on room air, or at prior home O2 therapy level.      Able to wean O2 this shift to keep sats above 90%?: No, further care required to meet this goal. Please explain On O2 at 2 PM for comfort   Does patient use Home O2? No          Current oxygenation status:   SpO2: 97 %     O2 Device: Nasal cannula, Oxygen Delivery: 2 LPM    3.  Tolerates ambulation and mobility near baseline.     Ambulation: No, further care required to meet this goal. Please explain refused   Times patient ambulated with staff this shift: 0    Please review the Heart Failure Care Map for additional HF goal outcomes.    Rebecca Treviño RN  4/26/2024

## 2024-04-27 NOTE — PROGRESS NOTES
[facility]    Medicine Progress Note - Hospitalist Service    Date of Admission:  4/25/2024    Assessment & Plan                Sedrick Murray is a 74 year old male with history of hemorrhagic stroke, coronary artery disease, diastolic heart failure, gout, recent hospitalization 4/11-4/15 for CHF and chest pain, underwent coronary angiogram without a clear intervenable culprit lesion.  Patient symptoms had improved and he discharged home.  Now he is back for worsening chest pain once again. Hospital Day: 3    #Chest pain  -presented with chest pain previously, had angiogram showed unchanged findings of 80 % stenosis of distal LAD lesion; massively aneurysmal RCA. Felt to be unlikely to be cause of chest pain and is a high risk candidate for DAPT due to history of hemorrhagic stroke, therefore stent risk/benefit profile felt unfavorable. Medical mgmt recommended. There was mention that angioplaty of LAD lesion could be considered if patient's symptoms did not resolve. Last time symptoms had resolved and patient discharged.  -Had CT PE run and abd CT last time to work up any non cardiac causes of chest pain. Nothing obvious seen. Could need further eval if pain persists despite cardiac optimization.  -recheck EKG and trop given CP this afternoon  -will check TTE (?post cath pericarditis vs other. No obvious findings on previous EKG)   -cardiology consultation, signed off 4/27/24. May need re-consult pending above   -Continue home rosuvastatin, baby aspirin  -eric with up-titration of Imdur but overall pt describing atypical chest pain with and without anginal chest pain     #acute hypoxic respiratory failure  -on 1-2L currently   -work-up and management as noted above  -wean O2 as able  -further diagnostics pending the above    #Aortic regurgitation  -Moderate to severe on recent echo  -No need for acute intervention per cardiology  -Follow-up as outpatient with his regular  "cardiologist    #elevated creatinine  -not meeting PANKAJ criteria  -monitor  -encourage PO intake     #Chronic diastolic heart failure  -Continue home Bumex, spironolactone  -Recently taken off of his ARB    #History of hemorrhagic stroke  -Tolerates baby aspirin  -Continue home statin    #Suspected VICTORINA  -Noted with nocturnal hypoxia during last hospital stay and chronically with metabolic alkalosis on his BMP  -Recommend outpatient sleep evaluation    #DM  -A1c 7.1  -Not on medications  -hypoglycemia protocol, sliding scale    #Insomnia, home trazodone  #GERD, home PPI  #Hypertension, home amlodipine, spironolactone  #Gout, home allopurinol          Diet: Combination Diet Moderate Consistent Carb (60 g CHO per Meal) Diet; 2 gm NA Diet; Low Saturated Fat Diet    DVT Prophylaxis: Moderate risk.   Enoxaparin (Lovenox) SQ  Eugene Catheter: Not present  Lines: None     Cardiac Monitoring: ACTIVE order. Indication: Chest pain/ ACS rule out (24 hours)  Code Status: Full Code      Clinically Significant Risk Factors Present on Admission                # Drug Induced Platelet Defect: home medication list includes an antiplatelet medication   # Hypertension: Noted on problem list     # DMII: A1C = 7.4 % (Ref range: <5.7 %) within past 6 months    # Overweight: Estimated body mass index is 29.33 kg/m  as calculated from the following:    Height as of this encounter: 1.626 m (5' 4\").    Weight as of this encounter: 77.5 kg (170 lb 14.4 oz).              Disposition Plan     Medically Ready for Discharge: Anticipated Tomorrow         Discharge barrier(s): pain control, possible angio      Krish Collins DO  Hospitalist Service  [facility]  Securely message with [employer] (more info)  Text page via [facility] Paging/Directory   ______________________________________________________________________  Subjective:  Patient still with chest pain. States it comes and goes. Describes it as a pin-prick at times and dull " at other times particularly with exertion.     Physical Exam   Vital Signs: Temp: 97.8  F (36.6  C) Temp src: Oral BP: 105/59 Pulse: 73   Resp: 18 SpO2: 93 % O2 Device: None (Room air) Oxygen Delivery: 1 LPM  Weight: 170 lbs 14.4 oz    General: in no apparent distress, non-toxic, and alert male lying in hospital bed oriented x3  HEENT: Head normocephalic atraumatic, oral mucosa moist. Sclerae anicteric  Skin: No rashes or lesions  Extremities: No peripheral edema  Psych: Normal affect, mood euthymic  Neuro: Grossly normal      Medical Decision Making               Data   Recent Results (from the past 16 hour(s))   Glucose by meter    Collection Time: 04/27/24  2:08 AM   Result Value Ref Range    GLUCOSE BY METER POCT 162 (H) 70 - 99 mg/dL   Glucose by meter    Collection Time: 04/27/24  6:05 AM   Result Value Ref Range    GLUCOSE BY METER POCT 120 (H) 70 - 99 mg/dL   Glucose by meter    Collection Time: 04/27/24  7:12 AM   Result Value Ref Range    GLUCOSE BY METER POCT 134 (H) 70 - 99 mg/dL   Glucose by meter    Collection Time: 04/27/24  9:46 AM   Result Value Ref Range    GLUCOSE BY METER POCT 145 (H) 70 - 99 mg/dL   Basic metabolic panel    Collection Time: 04/27/24 10:31 AM   Result Value Ref Range    Sodium 137 135 - 145 mmol/L    Potassium 4.0 3.4 - 5.3 mmol/L    Chloride 98 98 - 107 mmol/L    Carbon Dioxide (CO2) 28 22 - 29 mmol/L    Anion Gap 11 7 - 15 mmol/L    Urea Nitrogen 30.6 (H) 8.0 - 23.0 mg/dL    Creatinine 1.20 (H) 0.67 - 1.17 mg/dL    GFR Estimate 64 >60 mL/min/1.73m2    Calcium 9.5 8.8 - 10.2 mg/dL    Glucose 184 (H) 70 - 99 mg/dL

## 2024-04-27 NOTE — PLAN OF CARE
Problem: Chest Pain  Goal: Resolution of Chest Pain Symptoms  Outcome: Progressing     Problem: Heart Failure  Goal: Optimal Coping  Outcome: Progressing  Goal: Optimal Cardiac Output  Outcome: Progressing  Goal: Stable Heart Rate and Rhythm  Outcome: Progressing  Goal: Optimal Functional Ability  Outcome: Progressing  Intervention: Optimize Functional Ability  Recent Flowsheet Documentation  Taken 4/26/2024 2009 by Rebecca Treviño RN  Activity Management: activity adjusted per tolerance  Goal: Fluid and Electrolyte Balance  Outcome: Progressing  Goal: Improved Oral Intake  Outcome: Progressing  Goal: Effective Oxygenation and Ventilation  Outcome: Progressing  Intervention: Promote Airway Secretion Clearance  Recent Flowsheet Documentation  Taken 4/26/2024 2009 by Rebecca Treviño RN  Activity Management: activity adjusted per tolerance  Intervention: Optimize Oxygenation and Ventilation  Recent Flowsheet Documentation  Taken 4/26/2024 2009 by Rebecca Treviño RN  Head of Bed (HOB) Positioning: HOB at 20-30 degrees  Goal: Effective Breathing Pattern During Sleep  Outcome: Progressing  Intervention: Monitor and Manage Obstructive Sleep Apnea  Recent Flowsheet Documentation  Taken 4/26/2024 2009 by Rebecca Treviño RN  Medication Review/Management: medications reviewed   Goal Outcome Evaluation:       Pt is alert and oriented x 4, family at bedside when checked pt at 1930. Pt was feeling nauseous and vomited about 50 ml of emesis, with yellow phlegm noted. Zofran 4 mg IV given with some relief. He did have another episode of vomiting about 50 ml around 2100 approximately 50 ml emesis per pt's daughter. Pt c/o chest pain rated 5/10, described it as tightness in the mid chest, non radiating. Nitroglycerin 1 tab SL given x 1, pain came down to 3 /10 from 5/10 after 5 minutes pain , then after another 5 minutes pain came down to 2/10.  Lung sounds diminished, with fine crackles and he  verbalized feeling short of breath even at rest, with O2 at 2 LPM per nasal cannula. SpO2 97 to 98%. Albuterol inhaler given prn, pt feels better. HR 60's to 70's, NSR with first degree AVB, with inverted TW.Pt is sleeping comfortably in bed now. Spouse is staying overnight.

## 2024-04-27 NOTE — PROGRESS NOTES
Heart Failure Care Map  GOALS TO BE MET BEFORE DISCHARGE:    1. Decrease congestion and/or edema with diuretic therapy to achieve near optimal volume status.     Dyspnea improved: No, further care required to meet this goal. Please explain easily SOB with very little activity.   Edema improved: Yes, satisfactory for discharge.        Last 24 hour I/O:   Intake/Output Summary (Last 24 hours) at 4/27/2024 0114  Last data filed at 4/26/2024 2130  Gross per 24 hour   Intake 500 ml   Output 550 ml   Net -50 ml           Net I/O and Weights since admission:   03/28 0700 - 04/27 0659  In: 500 [P.O.:500]  Out: 550 [Urine:450]  Net: -50     Vitals:    04/25/24 1940 04/26/24 0153   Weight: 79.4 kg (175 lb) 77.5 kg (170 lb 14.4 oz)       2.  O2 sats > 90% on room air, or at prior home O2 therapy level.      Able to wean O2 this shift to keep sats above 90%?: No, further care required to meet this goal. Please explain desat's on room air.   Does patient use Home O2? No          Current oxygenation status:   SpO2: 97 %     O2 Device: Nasal cannula, Oxygen Delivery: 2 LPM    3.  Tolerates ambulation and mobility near baseline.     Ambulation: No, further care required to meet this goal. Please explain patient is weak and unsteady, some dizziness.   Times patient ambulated with staff this shift: 0    Please review the Heart Failure Care Map for additional HF goal outcomes.  Patient denies chest pain at this time, Tylenol 650 mg's given for 4/10 headache. Spouse at bedside, patient NPO for possible procedure.   Abigail Davis RN  4/27/2024

## 2024-04-28 ENCOUNTER — APPOINTMENT (OUTPATIENT)
Dept: RADIOLOGY | Facility: HOSPITAL | Age: 74
DRG: 313 | End: 2024-04-28
Attending: STUDENT IN AN ORGANIZED HEALTH CARE EDUCATION/TRAINING PROGRAM
Payer: COMMERCIAL

## 2024-04-28 ENCOUNTER — APPOINTMENT (OUTPATIENT)
Dept: NUCLEAR MEDICINE | Facility: HOSPITAL | Age: 74
DRG: 313 | End: 2024-04-28
Attending: STUDENT IN AN ORGANIZED HEALTH CARE EDUCATION/TRAINING PROGRAM
Payer: COMMERCIAL

## 2024-04-28 LAB
ANION GAP SERPL CALCULATED.3IONS-SCNC: 7 MMOL/L (ref 7–15)
ATRIAL RATE - MUSE: 84 BPM
BUN SERPL-MCNC: 32.4 MG/DL (ref 8–23)
CALCIUM SERPL-MCNC: 9.3 MG/DL (ref 8.8–10.2)
CHLORIDE SERPL-SCNC: 98 MMOL/L (ref 98–107)
CREAT SERPL-MCNC: 1.15 MG/DL (ref 0.67–1.17)
D DIMER PPP FEU-MCNC: 1.33 UG/ML FEU (ref 0–0.5)
DEPRECATED HCO3 PLAS-SCNC: 33 MMOL/L (ref 22–29)
DIASTOLIC BLOOD PRESSURE - MUSE: 74 MMHG
EGFRCR SERPLBLD CKD-EPI 2021: 67 ML/MIN/1.73M2
GLUCOSE BLDC GLUCOMTR-MCNC: 143 MG/DL (ref 70–99)
GLUCOSE BLDC GLUCOMTR-MCNC: 165 MG/DL (ref 70–99)
GLUCOSE BLDC GLUCOMTR-MCNC: 181 MG/DL (ref 70–99)
GLUCOSE BLDC GLUCOMTR-MCNC: 183 MG/DL (ref 70–99)
GLUCOSE BLDC GLUCOMTR-MCNC: 226 MG/DL (ref 70–99)
GLUCOSE BLDC GLUCOMTR-MCNC: 228 MG/DL (ref 70–99)
GLUCOSE BLDC GLUCOMTR-MCNC: 280 MG/DL (ref 70–99)
GLUCOSE SERPL-MCNC: 142 MG/DL (ref 70–99)
INTERPRETATION ECG - MUSE: NORMAL
P AXIS - MUSE: 63 DEGREES
POTASSIUM SERPL-SCNC: 3.9 MMOL/L (ref 3.4–5.3)
PR INTERVAL - MUSE: 206 MS
QRS DURATION - MUSE: 88 MS
QT - MUSE: 360 MS
QTC - MUSE: 425 MS
R AXIS - MUSE: 5 DEGREES
SODIUM SERPL-SCNC: 138 MMOL/L (ref 135–145)
SYSTOLIC BLOOD PRESSURE - MUSE: 157 MMHG
T AXIS - MUSE: 53 DEGREES
VENTRICULAR RATE- MUSE: 84 BPM

## 2024-04-28 PROCEDURE — A9540 TC99M MAA: HCPCS | Performed by: STUDENT IN AN ORGANIZED HEALTH CARE EDUCATION/TRAINING PROGRAM

## 2024-04-28 PROCEDURE — 250N000013 HC RX MED GY IP 250 OP 250 PS 637: Performed by: STUDENT IN AN ORGANIZED HEALTH CARE EDUCATION/TRAINING PROGRAM

## 2024-04-28 PROCEDURE — 120N000004 HC R&B MS OVERFLOW

## 2024-04-28 PROCEDURE — 85379 FIBRIN DEGRADATION QUANT: CPT | Performed by: STUDENT IN AN ORGANIZED HEALTH CARE EDUCATION/TRAINING PROGRAM

## 2024-04-28 PROCEDURE — A9567 TECHNETIUM TC-99M AEROSOL: HCPCS | Performed by: STUDENT IN AN ORGANIZED HEALTH CARE EDUCATION/TRAINING PROGRAM

## 2024-04-28 PROCEDURE — 71046 X-RAY EXAM CHEST 2 VIEWS: CPT

## 2024-04-28 PROCEDURE — 99232 SBSQ HOSP IP/OBS MODERATE 35: CPT | Performed by: STUDENT IN AN ORGANIZED HEALTH CARE EDUCATION/TRAINING PROGRAM

## 2024-04-28 PROCEDURE — 80048 BASIC METABOLIC PNL TOTAL CA: CPT | Performed by: STUDENT IN AN ORGANIZED HEALTH CARE EDUCATION/TRAINING PROGRAM

## 2024-04-28 PROCEDURE — 36415 COLL VENOUS BLD VENIPUNCTURE: CPT | Performed by: STUDENT IN AN ORGANIZED HEALTH CARE EDUCATION/TRAINING PROGRAM

## 2024-04-28 PROCEDURE — 343N000001 HC RX 343: Performed by: STUDENT IN AN ORGANIZED HEALTH CARE EDUCATION/TRAINING PROGRAM

## 2024-04-28 PROCEDURE — 272N000035 NM LUNG SCAN VENTILATION AND PERFUSION

## 2024-04-28 PROCEDURE — 250N000012 HC RX MED GY IP 250 OP 636 PS 637: Performed by: STUDENT IN AN ORGANIZED HEALTH CARE EDUCATION/TRAINING PROGRAM

## 2024-04-28 PROCEDURE — 250N000011 HC RX IP 250 OP 636: Performed by: INTERNAL MEDICINE

## 2024-04-28 PROCEDURE — 250N000013 HC RX MED GY IP 250 OP 250 PS 637: Performed by: INTERNAL MEDICINE

## 2024-04-28 RX ORDER — NICOTINE POLACRILEX 4 MG
15-30 LOZENGE BUCCAL
Status: DISCONTINUED | OUTPATIENT
Start: 2024-04-28 | End: 2024-04-28

## 2024-04-28 RX ORDER — DEXTROSE MONOHYDRATE 25 G/50ML
25-50 INJECTION, SOLUTION INTRAVENOUS
Status: DISCONTINUED | OUTPATIENT
Start: 2024-04-28 | End: 2024-04-28

## 2024-04-28 RX ORDER — SUCRALFATE ORAL 1 G/10ML
1 SUSPENSION ORAL
Status: DISCONTINUED | OUTPATIENT
Start: 2024-04-28 | End: 2024-04-29 | Stop reason: HOSPADM

## 2024-04-28 RX ORDER — ISOSORBIDE MONONITRATE 30 MG/1
60 TABLET, EXTENDED RELEASE ORAL DAILY
Status: DISCONTINUED | OUTPATIENT
Start: 2024-04-29 | End: 2024-04-29 | Stop reason: HOSPADM

## 2024-04-28 RX ADMIN — ROSUVASTATIN CALCIUM 40 MG: 40 TABLET, COATED ORAL at 08:19

## 2024-04-28 RX ADMIN — ALLOPURINOL 300 MG: 300 TABLET ORAL at 20:08

## 2024-04-28 RX ADMIN — SUCRALFATE 1 G: 1 SUSPENSION ORAL at 17:52

## 2024-04-28 RX ADMIN — ENOXAPARIN SODIUM 40 MG: 40 INJECTION SUBCUTANEOUS at 08:18

## 2024-04-28 RX ADMIN — ASPIRIN 81 MG: 81 TABLET, COATED ORAL at 08:19

## 2024-04-28 RX ADMIN — SUCRALFATE 1 G: 1 SUSPENSION ORAL at 20:07

## 2024-04-28 RX ADMIN — SPIRONOLACTONE 25 MG: 25 TABLET, FILM COATED ORAL at 08:19

## 2024-04-28 RX ADMIN — BUMETANIDE 1 MG: 1 TABLET ORAL at 08:19

## 2024-04-28 RX ADMIN — AMLODIPINE BESYLATE 10 MG: 5 TABLET ORAL at 08:18

## 2024-04-28 RX ADMIN — MORPHINE SULFATE 2 MG: 2 INJECTION, SOLUTION INTRAMUSCULAR; INTRAVENOUS at 10:55

## 2024-04-28 RX ADMIN — PANTOPRAZOLE SODIUM 40 MG: 40 TABLET, DELAYED RELEASE ORAL at 07:28

## 2024-04-28 RX ADMIN — KIT FOR THE PREPARATION OF TECHNETIUM TC 99M PENTETATE 61 MILLICURIE: 20 INJECTION, POWDER, LYOPHILIZED, FOR SOLUTION INTRAVENOUS; RESPIRATORY (INHALATION) at 12:41

## 2024-04-28 RX ADMIN — INSULIN GLARGINE 5 UNITS: 100 INJECTION, SOLUTION SUBCUTANEOUS at 09:45

## 2024-04-28 RX ADMIN — KIT FOR THE PREPARATION OF TECHNETIUM TC 99M ALBUMIN AGGREGATED 7.9 MILLICURIE: 2.5 INJECTION, POWDER, FOR SOLUTION INTRAVENOUS at 13:04

## 2024-04-28 RX ADMIN — INSULIN ASPART 1 UNITS: 100 INJECTION, SOLUTION INTRAVENOUS; SUBCUTANEOUS at 22:46

## 2024-04-28 RX ADMIN — INSULIN ASPART 1 UNITS: 100 INJECTION, SOLUTION INTRAVENOUS; SUBCUTANEOUS at 13:44

## 2024-04-28 RX ADMIN — INSULIN ASPART 1 UNITS: 100 INJECTION, SOLUTION INTRAVENOUS; SUBCUTANEOUS at 03:09

## 2024-04-28 RX ADMIN — SUCRALFATE 1 G: 1 SUSPENSION ORAL at 13:44

## 2024-04-28 RX ADMIN — INSULIN ASPART 1 UNITS: 100 INJECTION, SOLUTION INTRAVENOUS; SUBCUTANEOUS at 07:28

## 2024-04-28 RX ADMIN — INSULIN ASPART 1 UNITS: 100 INJECTION, SOLUTION INTRAVENOUS; SUBCUTANEOUS at 17:52

## 2024-04-28 RX ADMIN — INSULIN ASPART 2 UNITS: 100 INJECTION, SOLUTION INTRAVENOUS; SUBCUTANEOUS at 09:45

## 2024-04-28 RX ADMIN — ALLOPURINOL 300 MG: 300 TABLET ORAL at 08:19

## 2024-04-28 RX ADMIN — ISOSORBIDE MONONITRATE 30 MG: 30 TABLET, EXTENDED RELEASE ORAL at 08:19

## 2024-04-28 RX ADMIN — ALUMINUM HYDROXIDE, MAGNESIUM HYDROXIDE, AND DIMETHICONE 30 ML: 200; 20; 200 SUSPENSION ORAL at 09:50

## 2024-04-28 RX ADMIN — METOPROLOL SUCCINATE 25 MG: 25 TABLET, EXTENDED RELEASE ORAL at 08:19

## 2024-04-28 ASSESSMENT — ACTIVITIES OF DAILY LIVING (ADL)
ADLS_ACUITY_SCORE: 37
ADLS_ACUITY_SCORE: 38
ADLS_ACUITY_SCORE: 37
ADLS_ACUITY_SCORE: 37
ADLS_ACUITY_SCORE: 38
ADLS_ACUITY_SCORE: 37
ADLS_ACUITY_SCORE: 37
ADLS_ACUITY_SCORE: 38
ADLS_ACUITY_SCORE: 38
ADLS_ACUITY_SCORE: 37
ADLS_ACUITY_SCORE: 37
ADLS_ACUITY_SCORE: 38
ADLS_ACUITY_SCORE: 37
ADLS_ACUITY_SCORE: 37
ADLS_ACUITY_SCORE: 38
ADLS_ACUITY_SCORE: 38
ADLS_ACUITY_SCORE: 37
ADLS_ACUITY_SCORE: 38
ADLS_ACUITY_SCORE: 38
ADLS_ACUITY_SCORE: 37
ADLS_ACUITY_SCORE: 37

## 2024-04-28 NOTE — PLAN OF CARE
"  Problem: Adult Inpatient Plan of Care  Goal: Plan of Care Review  Description: The Plan of Care Review/Shift note should be completed every shift.  The Outcome Evaluation is a brief statement about your assessment that the patient is improving, declining, or no change.  This information will be displayed automatically on your shift  note.  Outcome: Progressing  Flowsheets (Taken 4/28/2024 1444)  Plan of Care Reviewed With:   patient   family  Goal: Patient-Specific Goal (Individualized)  Description: You can add care plan individualizations to a care plan. Examples of Individualization might be:  \"Parent requests to be called daily at 9am for status\", \"I have a hard time hearing out of my right ear\", or \"Do not touch me to wake me up as it startles  me\".  Outcome: Progressing  Goal: Absence of Hospital-Acquired Illness or Injury  Outcome: Progressing  Intervention: Identify and Manage Fall Risk  Recent Flowsheet Documentation  Taken 4/28/2024 0800 by Abdelrahman Hallman RN  Safety Promotion/Fall Prevention: activity supervised  Intervention: Prevent and Manage VTE (Venous Thromboembolism) Risk  Recent Flowsheet Documentation  Taken 4/28/2024 0800 by Abdelrahman Hallman RN  VTE Prevention/Management: SCDs (sequential compression devices) on  Intervention: Prevent Infection  Recent Flowsheet Documentation  Taken 4/28/2024 0800 by Abdelrahman Hallman RN  Infection Prevention:   environmental surveillance performed   rest/sleep promoted  Goal: Optimal Comfort and Wellbeing  Outcome: Progressing  Intervention: Monitor Pain and Promote Comfort  Recent Flowsheet Documentation  Taken 4/28/2024 1200 by Abdelrahman Hallman RN  Pain Management Interventions:   medication (see MAR)   MD notified (comment)  Taken 4/28/2024 0833 by Abdelrahman Hallman RN  Pain Management Interventions:   medication (see MAR)   MD notified (comment)  Taken 4/28/2024 0800 by Abdelrahman Hallman RN  Pain Management Interventions: medication (see MAR)  Goal: Readiness for " Transition of Care  Outcome: Progressing     Problem: Chest Pain  Goal: Resolution of Chest Pain Symptoms  Outcome: Progressing  Intervention: Manage Acute Chest Pain  Recent Flowsheet Documentation  Taken 4/28/2024 1200 by Abdelrahman Hallman RN  Chest Pain Intervention: morphine given  Taken 4/28/2024 0800 by Abdelarhman Hallman RN  Chest Pain Intervention: (Maalox) other (see comments)     Problem: Heart Failure  Goal: Optimal Coping  Outcome: Progressing  Intervention: Support Psychosocial Response  Recent Flowsheet Documentation  Taken 4/28/2024 0800 by Abdelrahman Hallman RN  Supportive Measures: active listening utilized  Goal: Optimal Cardiac Output  Outcome: Progressing  Intervention: Optimize Cardiac Output  Recent Flowsheet Documentation  Taken 4/28/2024 0800 by Abdelrahman Hallman RN  Environmental Support:   calm environment promoted   personal routine supported  Goal: Stable Heart Rate and Rhythm  Outcome: Progressing  Goal: Optimal Functional Ability  Outcome: Progressing  Intervention: Optimize Functional Ability  Recent Flowsheet Documentation  Taken 4/28/2024 1000 by Abdelrahman Hallman RN  Activity Management: patient refuses activity  Goal: Fluid and Electrolyte Balance  Outcome: Progressing  Goal: Improved Oral Intake  Outcome: Progressing  Goal: Effective Oxygenation and Ventilation  Outcome: Progressing  Intervention: Promote Airway Secretion Clearance  Recent Flowsheet Documentation  Taken 4/28/2024 1200 by Abdelrahman Hallman RN  Cough And Deep Breathing: done independently per patient  Taken 4/28/2024 1000 by Abdelrahman Hallman RN  Activity Management: patient refuses activity  Taken 4/28/2024 0800 by Abdelrahman Hallman RN  Cough And Deep Breathing: done independently per patient  Goal: Effective Breathing Pattern During Sleep  Outcome: Progressing  Intervention: Monitor and Manage Obstructive Sleep Apnea  Recent Flowsheet Documentation  Taken 4/28/2024 0800 by Abdelrahman Hallman RN  Medication Review/Management:  medications reviewed     Problem: Risk for Delirium  Goal: Optimal Coping  Outcome: Progressing  Intervention: Optimize Psychosocial Adjustment to Delirium  Recent Flowsheet Documentation  Taken 4/28/2024 0800 by Abdelrahman Hallman RN  Supportive Measures: active listening utilized  Goal: Improved Behavioral Control  Outcome: Progressing  Intervention: Prevent and Manage Agitation  Recent Flowsheet Documentation  Taken 4/28/2024 0800 by Abdelrahman Hallman RN  Environment Familiarity/Consistency: daily routine followed  Intervention: Minimize Safety Risk  Recent Flowsheet Documentation  Taken 4/28/2024 0800 by Abdelrahman Hallman RN  Communication Enhancement Strategies: call light answered in person  Enhanced Safety Measures: review medications for side effects with activity  Goal: Improved Attention and Thought Clarity  Outcome: Progressing  Intervention: Maximize Cognitive Function  Recent Flowsheet Documentation  Taken 4/28/2024 0800 by Abdelrahman Hallman RN  Sensory Stimulation Regulation: care clustered  Reorientation Measures:   clock in view   calendar in view  Goal: Improved Sleep  Outcome: Progressing     Goal Outcome Evaluation:      Plan of Care Reviewed With: patient, family    Pt A/O x 4. Vitally stable & saturating well on 1LPM via NC - lungs diminished with fine crackles to lower lobes, pt endorses BRAVO & intermittent SOB at rest. Tele shows NSR. Pt still with ongoing angina rated 3/10 - poorly managed with PRN meds. Pt states that none of the meds we have given here have helped improve his angina other than the IV morphine - and even with IV morphine, his relief is very short lived. MD aware - believes angina may be chronic due to his CHF. Pt transferred down for VQ scan - results pending. PCDs in place. Pt up OOB with AO1. Family & pt provided with education on patients new diagnosis of diabetes. Will continue to monitor.     Abdelrahman Hallman RN on 4/28/2024 at 2:50 PM

## 2024-04-28 NOTE — PROGRESS NOTES
[facility]    Medicine Progress Note - Hospitalist Service    Date of Admission:  4/25/2024    Assessment & Plan          Sedrick Murray is a 74 year old male with history of hemorrhagic stroke, coronary artery disease, diastolic heart failure, gout, recent hospitalization 4/11-4/15 for CHF and chest pain, underwent coronary angiogram without a clear intervenable culprit lesion.  Patient symptoms had improved and he discharged home.  Now he is back for worsening chest pain once again. Hospital Day: 4    #Chest pain  -presented with chest pain previously, had angiogram showed unchanged findings of 80 % stenosis of distal LAD lesion; massively aneurysmal RCA. Felt to be unlikely to be cause of chest pain and is a high risk candidate for DAPT due to history of hemorrhagic stroke, therefore stent risk/benefit profile felt unfavorable. Medical mgmt recommended. There was mention that angioplaty of LAD lesion could be considered if patient's symptoms did not resolve. Last time symptoms had resolved and patient discharged.  -GI cocktails do not help the pain  -Had CT PE and abd CT last hospitalization to work up any non-cardiac causes of chest pain. Nothing obvious seen  -TTE unrevealing for post-cath pericarditis or other obvious explanation for chest pain  -will check VQ scan today given continued pain and intermediate Wells risk with elevated d-dimer   -cardiology consultation, signed off 4/27/24. May need re-consult pending above as there was discussion about fixing LAD lesion if symptoms do not improve  -Continue home rosuvastatin, baby aspirin  -increased Imdur 4/28/24. BP will allow for continued increase so will increase to 60mg tomorrow     #acute hypoxic respiratory failure  #atelectasis vs other   -on 1-2L currently   -work-up and management as noted above  -wean O2 as able  -encourage out of bed   -incentive spirometry   -further diagnostics pending the above    #Aortic  "regurgitation  -Moderate to severe on recent echo  -No need for acute intervention per cardiology  -Follow-up as outpatient with his regular cardiologist    #elevated creatinine  -not meeting PANKAJ criteria  -monitor  -encourage PO intake     #Chronic diastolic heart failure  -Continue home Bumex, spironolactone  -Recently taken off of his ARB    #History of hemorrhagic stroke  -Tolerates baby aspirin  -Continue home statin    #Suspected VICTORINA  -Noted with nocturnal hypoxia during last hospital stay and chronically with metabolic alkalosis on his BMP  -Recommend outpatient sleep evaluation    #DM  -A1c 7.1  -Not on medications  -hypoglycemia protocol, sliding scale  -will add 5U basal in AM for better control      #Insomnia, home trazodone  #GERD, home PPI  #Hypertension, home amlodipine, spironolactone  #Gout, home allopurinol          Diet: Combination Diet Moderate Consistent Carb (60 g CHO per Meal) Diet; 2 gm NA Diet; Low Saturated Fat Diet    DVT Prophylaxis: Moderate risk.   Enoxaparin (Lovenox) SQ  Eugene Catheter: Not present  Lines: None     Cardiac Monitoring: ACTIVE order. Indication: Chest pain/ ACS rule out (24 hours)  Code Status: Full Code      Clinically Significant Risk Factors                  # Hypertension: Noted on problem list       # DMII: A1C = 7.4 % (Ref range: <5.7 %) within past 6 months, PRESENT ON ADMISSION  # Overweight: Estimated body mass index is 28.56 kg/m  as calculated from the following:    Height as of this encounter: 1.626 m (5' 4\").    Weight as of this encounter: 75.5 kg (166 lb 6.4 oz)., PRESENT ON ADMISSION            Disposition Plan     Medically Ready for Discharge: Anticipated Tomorrow         Discharge barrier(s): pain control, possible angio      Krish Collins DO  Hospitalist Service  [facility]  Securely message with "[employer] (more info)  Text page via [employer] Paging/Directory "   ______________________________________________________________________  Subjective:  Had chest pain ovnight. No significant improvement. Breathing remains difficult. No other complaints.      Physical Exam   Vital Signs: Temp: 97.7  F (36.5  C) Temp src: Oral BP: 132/60 Pulse: 72   Resp: 18 SpO2: 92 % O2 Device: Nasal cannula Oxygen Delivery: 1 LPM  Weight: 166 lbs 6.4 oz    General: in no apparent distress, non-toxic, and alert male lying in hospital bed oriented x3  HEENT: Head normocephalic atraumatic, oral mucosa moist. Sclerae anicteric  Skin: No rashes or lesions  Extremities: No peripheral edema  Psych: Normal affect, mood euthymic  Neuro: Grossly normal      Medical Decision Making               Data   Recent Results (from the past 16 hour(s))   Glucose by meter    Collection Time: 04/28/24  3:08 AM   Result Value Ref Range    GLUCOSE BY METER POCT 143 (H) 70 - 99 mg/dL   Basic metabolic panel    Collection Time: 04/28/24  5:15 AM   Result Value Ref Range    Sodium 138 135 - 145 mmol/L    Potassium 3.9 3.4 - 5.3 mmol/L    Chloride 98 98 - 107 mmol/L    Carbon Dioxide (CO2) 33 (H) 22 - 29 mmol/L    Anion Gap 7 7 - 15 mmol/L    Urea Nitrogen 32.4 (H) 8.0 - 23.0 mg/dL    Creatinine 1.15 0.67 - 1.17 mg/dL    GFR Estimate 67 >60 mL/min/1.73m2    Calcium 9.3 8.8 - 10.2 mg/dL    Glucose 142 (H) 70 - 99 mg/dL   D dimer quantitative    Collection Time: 04/28/24  5:15 AM   Result Value Ref Range    D-Dimer Quantitative 1.33 (H) 0.00 - 0.50 ug/mL FEU   Glucose by meter    Collection Time: 04/28/24  6:43 AM   Result Value Ref Range    GLUCOSE BY METER POCT 226 (H) 70 - 99 mg/dL   Glucose by meter    Collection Time: 04/28/24  9:42 AM   Result Value Ref Range    GLUCOSE BY METER POCT 280 (H) 70 - 99 mg/dL   Glucose by meter    Collection Time: 04/28/24  1:45 PM   Result Value Ref Range    GLUCOSE BY METER POCT 165 (H) 70 - 99 mg/dL

## 2024-04-28 NOTE — PLAN OF CARE
Assumed care 1900 to 0730. A&O x 4. Assist x 1 with a walker. Tele is NSR. Denies pain. Urinal at bedside. 1L O2 via nasal cannula. ong  used for assessment. Wife at bedside, supportive of patient. Call light within reach, able to make needs known. Bed alarm on for safety.    Problem: Heart Failure  Goal: Optimal Functional Ability  Intervention: Optimize Functional Ability  Recent Flowsheet Documentation  Taken 4/28/2024 0307 by Wendy Gomez RN  Activity Management: activity adjusted per tolerance  Taken 4/28/2024 0015 by Wendy Gomez RN  Activity Management: activity adjusted per tolerance  Taken 4/27/2024 2100 by Wendy Gomez RN  Activity Management: activity adjusted per tolerance     Problem: Heart Failure  Goal: Optimal Coping  Intervention: Support Psychosocial Response  Recent Flowsheet Documentation  Taken 4/28/2024 0307 by Wendy Gomez RN  Supportive Measures: active listening utilized  Taken 4/28/2024 0015 by Wendy Gomez RN  Supportive Measures: active listening utilized  Taken 4/27/2024 2100 by Wendy Gomez RN  Supportive Measures: active listening utilized

## 2024-04-28 NOTE — PROGRESS NOTES
Care Management Follow Up    Length of Stay (days): 2    Expected Discharge Date: 04/29/2024     Concerns to be Addressed:     medical progression   Patient plan of care discussed at interdisciplinary rounds: Yes    Anticipated Discharge Disposition:  home      Anticipated Discharge Services:  none   Anticipated Discharge DME:  per treatment team     Patient/family educated on Medicare website which has current facility and service quality ratings:  NA  Education Provided on the Discharge Plan:  per treatment team   Patient/Family in Agreement with the Plan:  yes    Referrals Placed by CM/SW:  none required at this time   Private pay costs discussed: Not applicable    Additional Information:  SW reviewed chart. Anticipate discharge to home when medically appropriate. No CM needs identified at this time, however CM will follow care progression to assist as needed with safe discharge planning.     Social History:  Pt lives in house and has 2 sons living with him that assist him with 10.5 hours of PCA services provided by son. STORM notice explained to pt. Pt stated understanding. Pt family to transport home at discharge. Pt denies having any in home services.Family to transport at discharge.       Carol Ann HOPSON : [facility] Care Coordinator [phone]      PARRIS Jha

## 2024-04-28 NOTE — PROGRESS NOTES
[facility]    Medicine Progress Note - Hospitalist Service    Date of Admission:  4/25/2024    Assessment & Plan          Sedrick Murray is a 74 year old male with history of hemorrhagic stroke, coronary artery disease, diastolic heart failure, gout, recent hospitalization 4/11-4/15 for CHF and chest pain, underwent coronary angiogram without a clear intervenable culprit lesion.  Patient symptoms had improved and he discharged home.  Now he is back for worsening chest pain once again. Hospital Day: 4    #Chest pain  -presented with chest pain previously, had angiogram showed unchanged findings of 80 % stenosis of distal LAD lesion; massively aneurysmal RCA. Felt to be unlikely to be cause of chest pain and is a high risk candidate for DAPT due to history of hemorrhagic stroke, therefore stent risk/benefit profile felt unfavorable. Medical mgmt recommended. There was mention that angioplaty of LAD lesion could be considered if patient's symptoms did not resolve. Last time symptoms had resolved and patient discharged.  -GI cocktails do not help the pain  -Had CT PE and abd CT last hospitalization to work up any non-cardiac causes of chest pain. Nothing obvious seen  -TTE unrevealing for post-cath pericarditis or other obvious explanation for chest pain  -will check VQ scan today given continued pain and intermediate Wells risk with elevated d-dimer   -cardiology consultation, signed off 4/27/24. May need re-consult pending above as there was discussion about fixing LAD lesion if symptoms do not improve  -Continue home rosuvastatin, baby aspirin  -increased Imdur 4/28/24. BP will allow for continued increase so will increase to 60mg tomorrow     #acute hypoxic respiratory failure  -on 1-2L currently   -work-up and management as noted above  -wean O2 as able  -further diagnostics pending the above    #Aortic regurgitation  -Moderate to severe on recent echo  -No need for acute intervention per  "cardiology  -Follow-up as outpatient with his regular cardiologist    #elevated creatinine  -not meeting PANKAJ criteria  -monitor  -encourage PO intake     #Chronic diastolic heart failure  -Continue home Bumex, spironolactone  -Recently taken off of his ARB    #History of hemorrhagic stroke  -Tolerates baby aspirin  -Continue home statin    #Suspected VICTORINA  -Noted with nocturnal hypoxia during last hospital stay and chronically with metabolic alkalosis on his BMP  -Recommend outpatient sleep evaluation    #DM  -A1c 7.1  -Not on medications  -hypoglycemia protocol, sliding scale  -will add 5U basal in AM for better control      #Insomnia, home trazodone  #GERD, home PPI  #Hypertension, home amlodipine, spironolactone  #Gout, home allopurinol          Diet: Combination Diet Moderate Consistent Carb (60 g CHO per Meal) Diet; 2 gm NA Diet; Low Saturated Fat Diet    DVT Prophylaxis: Moderate risk.   Enoxaparin (Lovenox) SQ  Eugene Catheter: Not present  Lines: None     Cardiac Monitoring: ACTIVE order. Indication: Chest pain/ ACS rule out (24 hours)  Code Status: Full Code      Clinically Significant Risk Factors                  # Hypertension: Noted on problem list       # DMII: A1C = 7.4 % (Ref range: <5.7 %) within past 6 months, PRESENT ON ADMISSION  # Overweight: Estimated body mass index is 28.56 kg/m  as calculated from the following:    Height as of this encounter: 1.626 m (5' 4\").    Weight as of this encounter: 75.5 kg (166 lb 6.4 oz)., PRESENT ON ADMISSION            Disposition Plan     Medically Ready for Discharge: Anticipated Tomorrow         Discharge barrier(s): pain control, possible angio      Krish Collins DO  Hospitalist Service  [facility]  Securely message with [employer] (more info)  Text page via [facility] Paging/Directory   ______________________________________________________________________  Subjective:  Had chest pain ovnight. No significant improvement. Breathing remains " difficult. No other complaints.      Physical Exam   Vital Signs: Temp: 97.7  F (36.5  C) Temp src: Oral BP: 132/60 Pulse: 72   Resp: 18 SpO2: 92 % O2 Device: Nasal cannula Oxygen Delivery: 1 LPM  Weight: 166 lbs 6.4 oz    General: in no apparent distress, non-toxic, and alert male lying in hospital bed oriented x3  HEENT: Head normocephalic atraumatic, oral mucosa moist. Sclerae anicteric  Skin: No rashes or lesions  Extremities: No peripheral edema  Psych: Normal affect, mood euthymic  Neuro: Grossly normal      Medical Decision Making               Data   Recent Results (from the past 16 hour(s))   Glucose by meter    Collection Time: 04/28/24  3:08 AM   Result Value Ref Range    GLUCOSE BY METER POCT 143 (H) 70 - 99 mg/dL   Basic metabolic panel    Collection Time: 04/28/24  5:15 AM   Result Value Ref Range    Sodium 138 135 - 145 mmol/L    Potassium 3.9 3.4 - 5.3 mmol/L    Chloride 98 98 - 107 mmol/L    Carbon Dioxide (CO2) 33 (H) 22 - 29 mmol/L    Anion Gap 7 7 - 15 mmol/L    Urea Nitrogen 32.4 (H) 8.0 - 23.0 mg/dL    Creatinine 1.15 0.67 - 1.17 mg/dL    GFR Estimate 67 >60 mL/min/1.73m2    Calcium 9.3 8.8 - 10.2 mg/dL    Glucose 142 (H) 70 - 99 mg/dL   D dimer quantitative    Collection Time: 04/28/24  5:15 AM   Result Value Ref Range    D-Dimer Quantitative 1.33 (H) 0.00 - 0.50 ug/mL FEU   Glucose by meter    Collection Time: 04/28/24  6:43 AM   Result Value Ref Range    GLUCOSE BY METER POCT 226 (H) 70 - 99 mg/dL   Glucose by meter    Collection Time: 04/28/24  9:42 AM   Result Value Ref Range    GLUCOSE BY METER POCT 280 (H) 70 - 99 mg/dL   Glucose by meter    Collection Time: 04/28/24  1:45 PM   Result Value Ref Range    GLUCOSE BY METER POCT 165 (H) 70 - 99 mg/dL

## 2024-04-29 VITALS
OXYGEN SATURATION: 95 % | BODY MASS INDEX: 28.36 KG/M2 | HEIGHT: 64 IN | HEART RATE: 84 BPM | SYSTOLIC BLOOD PRESSURE: 126 MMHG | DIASTOLIC BLOOD PRESSURE: 60 MMHG | RESPIRATION RATE: 20 BRPM | WEIGHT: 166.1 LBS | TEMPERATURE: 98 F

## 2024-04-29 LAB
CREAT SERPL-MCNC: 1.25 MG/DL (ref 0.67–1.17)
EGFRCR SERPLBLD CKD-EPI 2021: 61 ML/MIN/1.73M2
GLUCOSE BLDC GLUCOMTR-MCNC: 157 MG/DL (ref 70–99)
GLUCOSE BLDC GLUCOMTR-MCNC: 204 MG/DL (ref 70–99)
PLATELET # BLD AUTO: 188 10E3/UL (ref 150–450)

## 2024-04-29 PROCEDURE — 250N000013 HC RX MED GY IP 250 OP 250 PS 637: Performed by: STUDENT IN AN ORGANIZED HEALTH CARE EDUCATION/TRAINING PROGRAM

## 2024-04-29 PROCEDURE — 250N000013 HC RX MED GY IP 250 OP 250 PS 637: Performed by: INTERNAL MEDICINE

## 2024-04-29 PROCEDURE — 36415 COLL VENOUS BLD VENIPUNCTURE: CPT | Performed by: INTERNAL MEDICINE

## 2024-04-29 PROCEDURE — 99239 HOSP IP/OBS DSCHRG MGMT >30: CPT | Performed by: HOSPITALIST

## 2024-04-29 PROCEDURE — 82565 ASSAY OF CREATININE: CPT | Performed by: INTERNAL MEDICINE

## 2024-04-29 PROCEDURE — 85049 AUTOMATED PLATELET COUNT: CPT | Performed by: INTERNAL MEDICINE

## 2024-04-29 RX ORDER — METOPROLOL SUCCINATE 25 MG/1
25 TABLET, EXTENDED RELEASE ORAL DAILY
Qty: 30 TABLET | Refills: 3 | Status: SHIPPED | OUTPATIENT
Start: 2024-04-30

## 2024-04-29 RX ORDER — NITROGLYCERIN 0.4 MG/1
TABLET SUBLINGUAL
Qty: 20 TABLET | Refills: 3 | Status: SHIPPED | OUTPATIENT
Start: 2024-04-29

## 2024-04-29 RX ORDER — PANTOPRAZOLE SODIUM 40 MG/1
40 TABLET, DELAYED RELEASE ORAL
Qty: 60 TABLET | Refills: 3 | Status: SHIPPED | OUTPATIENT
Start: 2024-04-29

## 2024-04-29 RX ORDER — MAGNESIUM HYDROXIDE/ALUMINUM HYDROXICE/SIMETHICONE 120; 1200; 1200 MG/30ML; MG/30ML; MG/30ML
30 SUSPENSION ORAL EVERY 4 HOURS PRN
COMMUNITY
Start: 2024-04-29

## 2024-04-29 RX ORDER — SUCRALFATE ORAL 1 G/10ML
1 SUSPENSION ORAL
Qty: 120 ML | Refills: 0 | Status: SHIPPED | OUTPATIENT
Start: 2024-04-29

## 2024-04-29 RX ORDER — ISOSORBIDE MONONITRATE 60 MG/1
60 TABLET, EXTENDED RELEASE ORAL DAILY
Qty: 30 TABLET | Refills: 3 | Status: SHIPPED | OUTPATIENT
Start: 2024-04-30

## 2024-04-29 RX ADMIN — INSULIN ASPART 1 UNITS: 100 INJECTION, SOLUTION INTRAVENOUS; SUBCUTANEOUS at 03:41

## 2024-04-29 RX ADMIN — ASPIRIN 81 MG: 81 TABLET, COATED ORAL at 08:49

## 2024-04-29 RX ADMIN — PANTOPRAZOLE SODIUM 40 MG: 40 TABLET, DELAYED RELEASE ORAL at 07:37

## 2024-04-29 RX ADMIN — SUCRALFATE 1 G: 1 SUSPENSION ORAL at 07:37

## 2024-04-29 RX ADMIN — SUCRALFATE 1 G: 1 SUSPENSION ORAL at 11:35

## 2024-04-29 RX ADMIN — AMLODIPINE BESYLATE 10 MG: 5 TABLET ORAL at 08:49

## 2024-04-29 RX ADMIN — ALLOPURINOL 300 MG: 300 TABLET ORAL at 08:49

## 2024-04-29 RX ADMIN — INSULIN GLARGINE 5 UNITS: 100 INJECTION, SOLUTION SUBCUTANEOUS at 07:37

## 2024-04-29 RX ADMIN — INSULIN ASPART 1 UNITS: 100 INJECTION, SOLUTION INTRAVENOUS; SUBCUTANEOUS at 07:37

## 2024-04-29 RX ADMIN — ROSUVASTATIN CALCIUM 40 MG: 40 TABLET, COATED ORAL at 08:49

## 2024-04-29 RX ADMIN — METOPROLOL SUCCINATE 25 MG: 25 TABLET, EXTENDED RELEASE ORAL at 08:49

## 2024-04-29 RX ADMIN — SPIRONOLACTONE 25 MG: 25 TABLET, FILM COATED ORAL at 08:49

## 2024-04-29 RX ADMIN — ISOSORBIDE MONONITRATE 60 MG: 30 TABLET, EXTENDED RELEASE ORAL at 08:49

## 2024-04-29 RX ADMIN — BUMETANIDE 1 MG: 1 TABLET ORAL at 08:49

## 2024-04-29 ASSESSMENT — ACTIVITIES OF DAILY LIVING (ADL)
ADLS_ACUITY_SCORE: 37

## 2024-04-29 NOTE — PLAN OF CARE
Assumed care 2300 to 0730. A&O x 4. Assist x 1 with a walker. Tele is NSR. Denies pain. Room air. Urinal at bedside. Patient slept for the majority of the shift. Wife at bedside supportive of patient. Call light within reach, able to make needs known. Bed alarm on for safety.    Problem: Risk for Delirium  Goal: Improved Attention and Thought Clarity  Intervention: Maximize Cognitive Function  Recent Flowsheet Documentation  Taken 4/29/2024 0415 by Wendy Gomez RN  Sensory Stimulation Regulation: care clustered  Reorientation Measures:   clock in view   calendar in view  Taken 4/29/2024 0015 by Wendy Gomez RN  Sensory Stimulation Regulation: care clustered  Reorientation Measures:   clock in view   calendar in view     Problem: Heart Failure  Goal: Effective Breathing Pattern During Sleep  Intervention: Monitor and Manage Obstructive Sleep Apnea  Recent Flowsheet Documentation  Taken 4/29/2024 0415 by Wendy Gomez RN  Medication Review/Management: medications reviewed  Taken 4/29/2024 0015 by Wendy Gomez RN  Medication Review/Management: medications reviewed

## 2024-04-29 NOTE — DISCHARGE SUMMARY
"[facility]  Hospitalist Discharge Summary      Date of Admission:  4/25/2024  Date of Discharge:  4/29/2024  Discharging Provider: Cleveland Mercado DO  Discharge Service: Hospitalist Service    Discharge Diagnoses   Chest pain  CAD  Chronic HFpEF  DM2  GERD    Clinically Significant Risk Factors     # DMII: A1C = 7.4 % (Ref range: <5.7 %) within past 6 months  # Overweight: Estimated body mass index is 28.51 kg/m  as calculated from the following:    Height as of this encounter: 1.626 m (5' 4\").    Weight as of this encounter: 75.3 kg (166 lb 1.6 oz).       Follow-ups Needed After Discharge   Follow-up Appointments     Follow-up and recommended labs and tests       Follow up with primary care provider, SHASHANK DOUGLAS, within 7 days for   hospital follow- up.  The following labs/tests are recommended: CMP, CBC.          Follow up with cardiology if chest pain symptoms recur    Unresulted Labs Ordered in the Past 30 Days of this Admission       No orders found from 3/26/2024 to 4/26/2024.            Discharge Disposition   Discharged to home  Condition at discharge: Stable    Hospital Course    Patient is a 74 year old male with history of hemorrhagic stroke, coronary artery disease, diastolic heart failure, gout, recent hospitalization 4/11-4/15 for CHF and chest pain, underwent coronary angiogram without a clear intervenable culprit lesion.      #Chest pain  Recent angiogram showed unchanged findings of 80% stenosis of distal LAD lesion; massively aneurysmal RCA. Felt to be unlikely to be cause of chest pain and is a high risk candidate for DAPT due to history of hemorrhagic stroke, therefore stent risk/benefit profile felt unfavorable. Medical mgmt recommended. There was mention that angioplaty of LAD lesion could be considered if patient's symptoms did not resolve.   TTE unrevealing for post-cath pericarditis or other obvious explanation for chest pain  VQ scan very low probability  Continue " current cardiac regimen with added metoprolol and Imdur    #Mild hypoxia, possible acute hypoxic respiratory failure  #Atelectasis   s/p incentive spirometry  Weaned off 1L NC    #Aortic regurgitation  Moderate to severe on recent echo  No need for acute intervention per cardiology  Follow-up as outpatient with his regular cardiologist    #Chronic diastolic heart failure  Continue home Bumex, spironolactone  Recently taken off ARB    #History of hemorrhagic stroke  Tolerates baby aspirin  Continue home statin    #Suspected VICTORINA  Noted with nocturnal hypoxia during last hospital stay and chronically with metabolic alkalosis on his BMP  Recommend outpatient sleep evaluation    #DM  A1c 7.4  Diet controlled      Consultations This Hospital Stay   CARDIOLOGY IP CONSULT  CARE MANAGEMENT / SOCIAL WORK IP CONSULT    Code Status   Full Code    Time Spent on this Encounter   I, Cleveland Mercado DO, personally saw the patient today and spent greater than 30 minutes discharging this patient.       Cleveland Mercado DO  [facility]  [address] [place] [id number]  Phone: [phone]  Fax: [phone]  ______________________________________________________________________    Physical Exam   Vital Signs: Temp: 98  F (36.7  C) Temp src: Oral BP: 126/60 Pulse: 84   Resp: 20 SpO2: 95 % O2 Device: Nasal cannula Oxygen Delivery: 1/2 LPM  Weight: 166 lbs 1.6 oz  General appearance: No acute distress  Respiratory: Nonlabored breathing       Primary Care Physician   SHASHANK DOUGLAS    Discharge Orders      Reason for your hospital stay    Chest pain     Follow-up and recommended labs and tests     Follow up with primary care provider, SHASHANK DOUGLAS, within 7 days for hospital follow- up.  The following labs/tests are recommended: CMP, CBC.     Activity    Your activity upon discharge: activity as tolerated     Diet    Follow this diet upon discharge: Orders Placed This Encounter      Combination Diet  Moderate Consistent Carb (60 g CHO per Meal) Diet; 2 gm NA Diet; Low Saturated Fat Diet       Significant Results and Procedures   Most Recent 3 CBC's:  Recent Labs   Lab Test 04/29/24 0445 04/25/24 2026 04/14/24  0421 04/12/24  0555   WBC  --  6.7 8.0 9.1   HGB  --  16.4 16.5 16.0   MCV  --  93 95 94    179 101* 122*     Most Recent 3 BMP's:  Recent Labs   Lab Test 04/29/24  0711 04/29/24 0445 04/29/24 0229 04/28/24  2244 04/28/24  0643 04/28/24  0515 04/27/24  1415 04/27/24  1031 04/26/24  1305 04/25/24 2026   NA  --   --   --   --   --  138  --  137  --  138   POTASSIUM  --   --   --   --   --  3.9  --  4.0  --  3.5   CHLORIDE  --   --   --   --   --  98  --  98  --  97*   CO2  --   --   --   --   --  33*  --  28  --  31*   BUN  --   --   --   --   --  32.4*  --  30.6*  --  22.1   CR  --  1.25*  --   --   --  1.15  --  1.20*  --  1.08   ANIONGAP  --   --   --   --   --  7  --  11  --  10   SAHARA  --   --   --   --   --  9.3  --  9.5  --  9.5   *  --  157* 183*   < > 142*   < > 184*   < > 121*    < > = values in this interval not displayed.     Most Recent 2 LFT's:  Recent Labs   Lab Test 05/06/20  0520   AST 38   ALT 29   ALKPHOS 67   BILITOTAL 1.2*     Most Recent 3 BNP's:  Recent Labs   Lab Test 04/25/24 2026 04/11/24  1215   NTBNPI 308 1,801*     Most Recent D-dimer:  Recent Labs   Lab Test 04/28/24  0515   DD 1.33*     Most Recent Hemoglobin A1c:  Recent Labs   Lab Test 04/25/24 2026   A1C 7.4*   ,   Results for orders placed or performed during the hospital encounter of 04/25/24   XR Chest Port 1 View    Narrative    EXAM: XR CHEST PORT 1 VIEW  LOCATION: [facility]  DATE: 4/25/2024    INDICATION: Dyspnea  COMPARISON: 04/14/2024      Impression    IMPRESSION: Negative chest.   Chest CT w/o contrast    Narrative    EXAM: CT CHEST W/O CONTRAST  LOCATION: [facility]  DATE: 4/25/2024    INDICATION: Dyspnea.  COMPARISON: CTA chest  04/11/2024  TECHNIQUE: CT chest without IV contrast. Multiplanar reformats were obtained. Dose reduction techniques were used.  CONTRAST: None.    FINDINGS:   LUNGS AND PLEURA: No focal airspace disease. No pleural effusion or pneumothorax.    MEDIASTINUM/AXILLAE: No lymphadenopathy. Mild cardiomegaly. No pericardial effusion. Moderate aortic calcifications.    CORONARY ARTERY CALCIFICATION: Severe.    UPPER ABDOMEN: Status post cholecystectomy. Right renal upper pole 1.0 cm cyst; no follow-up indicated.    MUSCULOSKELETAL: Multilevel degenerative changes of the spine.      Impression    IMPRESSION:   1.  No acute findings in the chest.     NM Lung Scan Ventilation and Perfusion    Narrative    EXAM: NM LUNG SCAN VENTILATION AND PERFUSION  LOCATION: [facility]  DATE: 4/28/2024    INDICATION: hypoxia, chest pain. Intermediate PE risk  COMPARISON: Chest x-ray 04/28/2024 CT chest 04/25/2024 and CTA chest 04/11/2024.  TECHNIQUE: 61 mCi Tc-99m DTPA inhaled. 7.9 mCi Tc-99m MAA, IV. Standard planar imaging during perfusion and ventilation portions of exam.    FINDINGS: No significant findings on ventilation with some defect left lung base from prominent heart. Perfusion also shows some defect left lung bases from prominent heart. Minimal subsegmental defect left upper lobe posteriorly.      Impression    IMPRESSION:    Very low probability for pulmonary emboli.   XR Chest 2 Views    Narrative    EXAM: XR CHEST 2 VIEWS  LOCATION: [facility]  DATE: 04/28/2024    INDICATION: For VQ lung scan.  COMPARISON: CT chest 04/25/2024.      Impression    IMPRESSION: Slight elevation of the right hemidiaphragm. Right infrahilar linear opacities is likely atelectasis. No focal consolidation or pleural effusion. Similar cardiomediastinal silhouette.       Echocardiogram Complete     Value    LVEF  55-60%    Narrative    [id number]  [id number]  [id number]  [id number]^LARES^[place] [address]     Name: PAYTON BRANDT  MRN: [id number]  : 1950  Study Date: 2024 01:37 PM  Age: 74 yrs  Gender: Male  Patient Location: [facility]  Reason For Study: Chest Pain, Pericardial Effusion  Ordering Physician: [place]NA LARES  Performed By: Manohar Jiménez     BSA: 1.8 m2  Height: 64 in  Weight: 170 lb  HR: 71  BP: 105/59 mmHg  ______________________________________________________________________________  Procedure  Complete Portable Echo Adult. Poor quality two-dimensional was performed and  interpreted. Adequate quality color and spectral Doppler were performed and  interpreted. Hemodynamically significant valve disease is identified.  Recommend referral to valve clinic for further evaluation. Valve clinic phone  number: [phone]. [facility] Epic: [facility].  ______________________________________________________________________________  Interpretation Summary     Left ventricular size, wall motion and function are normal. The ejection  fraction is 55-60%.  There is mild concentric left ventricular hypertrophy.  Normal right ventricle size and systolic function.  A bicuspid aortic valve cannot be excluded.  There is moderate (2+) aortic regurgitation.  There is an eccentric jet of aortic insufficiency directed against the  anterior mitral leaflet.  Redundant elongated chordae are noted.  Ascending Aorta dilatation is present.  Compared to prior study, there is no significant change. Consider LOKESH  evaluation to better assess mitral and aortic valve abnormalities.  ______________________________________________________________________________  Left Ventricle  Left ventricular size, wall motion and function are normal. The ejection  fraction is 55-60%. There is mild concentric left ventricular hypertrophy.  Left ventricular diastolic function is normal. No regional wall motion  abnormalities noted.     Right Ventricle  Normal right  ventricle size and systolic function. TAPSE is abnormal, which is  consistent with abnormal right ventricular systolic function.     Atria  Normal left atrial size. Right atrial size is normal. There is no color  Doppler evidence of an atrial shunt.     Mitral Valve  Redundant elongated chordae are noted. There is trace to mild mitral  regurgitation.     Tricuspid Valve  Right ventricle systolic pressure estimate normal.     Aortic Valve  A bicuspid aortic valve cannot be excluded. There is moderate (2+) aortic  regurgitation. There is an eccentric jet of aortic insufficiency directed  against the anterior mitral leaflet.     Pulmonic Valve  The pulmonic valve is not well seen, but is grossly normal. There is mild (1+)  pulmonic valvular regurgitation.     Vessels  The aorta root is normal. There is effacement of the sinotubular ridge.  Ascending Aorta dilatation is present. IVC diameter <2.1 cm collapsing >50%  with sniff suggests a normal RA pressure of 3 mmHg.     Pericardium  There is no pericardial effusion.     ______________________________________________________________________________  MMode/2D Measurements & Calculations  IVSd: 1.5 cm  LVIDd: 4.6 cm  LVIDs: 3.2 cm  LVPWd: 1.5 cm  FS: 30.2 %     LV mass(C)d: 294.4 grams  LV mass(C)dI: 161.3 grams/m2  Ao root diam: 3.5 cm  asc Aorta Diam: 3.8 cm  LVOT diam: 2.3 cm  LVOT area: 4.0 cm2  Ao root diam index Ht(cm/m): 2.2  Ao root diam index BSA (cm/m2): 1.9  Asc Ao diam index BSA (cm/m2): 2.1  Asc Ao diam index Ht(cm/m): 2.3  EF Biplane: 53.5 %  LA Volume (BP): 33.5 ml     LA Volume Index (BP): 18.3 ml/m2  LA Volume Indexed (AL/bp): 19.9 ml/m2  RWT: 0.66  TAPSE: 1.1 cm     Doppler Measurements & Calculations  MV E max juan carlos: 44.5 cm/sec  MV A max juan carlos: 75.5 cm/sec  MV E/A: 0.59  MV dec slope: 120.0 cm/sec2  MV dec time: 0.37 sec  Ao V2 max: 175.0 cm/sec  Ao max P.0 mmHg  Ao V2 mean: 107.0 cm/sec  Ao mean P.0 mmHg  Ao V2 VTI: 31.4 cm  CARMEN(I,D): 2.6  cm2  CARMEN(V,D): 2.8 cm2     AI P1/2t: 378.2 msec  LV V1 max P.3 mmHg  LV V1 max: 125.0 cm/sec  LV V1 VTI: 20.8 cm  SV(LVOT): 82.7 ml  SI(LVOT): 45.3 ml/m2  PA V2 max: 78.6 cm/sec  PA max P.5 mmHg  PA acc time: 0.09 sec  AV Noah Ratio (DI): 0.71  CARMEN Index (cm2/m2): 1.4  E/E' av.9  Lateral E/e': 6.1  Medial E/e': 9.7  RV S Noah: 13.5 cm/sec     ______________________________________________________________________________  Report approved by: Rhiannon Lance 2024 03:08 PM             Discharge Medications   Current Discharge Medication List        START taking these medications    Details   alum & mag hydroxide-simethicone (MAALOX) 200-200-20 MG/5ML SUSP suspension Take 30 mLs by mouth every 4 hours as needed for indigestion or heartburn    Associated Diagnoses: Gastroesophageal reflux disease with esophagitis, unspecified whether hemorrhage      isosorbide mononitrate (IMDUR) 60 MG 24 hr tablet Take 1 tablet (60 mg) by mouth daily  Qty: 30 tablet, Refills: 3    Associated Diagnoses: Chest pain, unspecified type; Chronic diastolic heart failure (H); Coronary artery disease due to lipid rich plaque      metoprolol succinate ER (TOPROL XL) 25 MG 24 hr tablet Take 1 tablet (25 mg) by mouth daily  Qty: 30 tablet, Refills: 3    Associated Diagnoses: Chronic diastolic heart failure (H)      nitroGLYcerin (NITROSTAT) 0.4 MG sublingual tablet For chest pain place 1 tablet under the tongue every 5 minutes for 3 doses. If symptoms persist 5 minutes after 1st dose call 911.  Qty: 20 tablet, Refills: 3    Associated Diagnoses: Chest pain, unspecified type; Coronary artery disease due to lipid rich plaque      sucralfate (CARAFATE) 1 GM/10ML suspension Take 10 mLs (1 g) by mouth 4 times daily (before meals and nightly)  Qty: 120 mL, Refills: 0    Associated Diagnoses: Gastroesophageal reflux disease with esophagitis, unspecified whether hemorrhage           CONTINUE these medications which have CHANGED     Details   pantoprazole (PROTONIX) 40 MG EC tablet Take 1 tablet (40 mg) by mouth 2 times daily (before meals)  Qty: 60 tablet, Refills: 3    Associated Diagnoses: Acute chest pain           CONTINUE these medications which have NOT CHANGED    Details   acetaminophen (TYLENOL) 650 MG CR tablet Take 650 mg by mouth every 8 hours as needed      allopurinol (ZYLOPRIM) 300 MG tablet Take 300 mg by mouth 2 times daily      amLODIPine (NORVASC) 10 MG tablet Take 10 mg by mouth daily      aspirin 81 MG EC tablet Take 81 mg by mouth daily      bumetanide (BUMEX) 1 MG tablet Take 1 tablet (1 mg) by mouth daily  Qty: 30 tablet, Refills: 1    Associated Diagnoses: Acute diastolic heart failure (H)      diclofenac (VOLTAREN) 1 % topical gel Apply 4 g topically 4 times daily as needed      rosuvastatin (CRESTOR) 40 MG tablet Take 40 mg by mouth daily      spironolactone (ALDACTONE) 25 MG tablet Take 1 tablet (25 mg) by mouth daily  Qty: 30 tablet, Refills: 1    Associated Diagnoses: Acute diastolic heart failure (H)      traZODone (DESYREL) 150 MG tablet Take 150 mg by mouth nightly as needed           Allergies   Allergies   Allergen Reactions    Dilaudid [Hydromorphone] Unknown    Phenytoin Hives and Itching

## 2024-04-29 NOTE — PLAN OF CARE
Heart Failure Care Map  GOALS TO BE MET BEFORE DISCHARGE:    1. Decrease congestion and/or edema with diuretic therapy to achieve near optimal volume status.     Dyspnea improved: Yes, satisfactory for discharge.   Edema improved: Yes, satisfactory for discharge.        Last 24 hour I/O:   Intake/Output Summary (Last 24 hours) at 4/28/2024 2232  Last data filed at 4/28/2024 1354  Gross per 24 hour   Intake 360 ml   Output 450 ml   Net -90 ml           Net I/O and Weights since admission:   03/29 2300 - 04/28 2259  In: 1730 [P.O.:1730]  Out: 1750 [Urine:1650]  Net: -20     Vitals:    04/25/24 1940 04/26/24 0153 04/28/24 0600   Weight: 79.4 kg (175 lb) 77.5 kg (170 lb 14.4 oz) 75.5 kg (166 lb 6.4 oz)       2.  O2 sats > 90% on room air, or at prior home O2 therapy level.      Able to wean O2 this shift to keep sats above 90%?: Yes   Does patient use Home O2? No          Current oxygenation status:   SpO2: 97 %     O2 Device: Nasal cannula, Oxygen Delivery: 1/2 LPM    3.  Tolerates ambulation and mobility near baseline.     Ambulation: No, further care required to meet this goal. Please explain weakness   Times patient ambulated with staff this shift: 0    Please review the Heart Failure Care Map for additional HF goal outcomes.    Yary Banuelos RN  4/28/2024  Goal Outcome Evaluation:    A/O, wean Room Air, Sp02 low 90's. Denied pain. Activity encouraged, sitting at edge of bed, up in chair, declined to ambulate d/t weakness/SOB. Voiding. I.S Q2H up to 1500 ml. Rhythm- NSR. BG-181 and 183, Ssi given.

## 2024-04-29 NOTE — PROGRESS NOTES
Care Management Discharge Note    Discharge Date: 04/29/2024       Discharge Disposition:  home with wife Donovan and two sons    Discharge Services:  n/a    Discharge DME:  n/a    Discharge Transportation: family or friend will provide    Private pay costs discussed: Not applicable    Does the patient's insurance plan have a 3 day qualifying hospital stay waiver?  No    PAS Confirmation Code: n/a  Patient/family educated on Medicare website which has current facility and service quality ratings: yes     Education Provided on the Discharge Plan: yes   Persons Notified of Discharge Plans: patient   Patient/Family in Agreement with the Plan:  yes    Handoff Referral Completed: Yes    Additional Information:  Pt to discharge home with wife Donovan and two adult sons, no CM needs identified at the moment. Family to transport.    [facility]   4/29/2024

## 2024-07-21 ENCOUNTER — HOSPITAL ENCOUNTER (EMERGENCY)
Facility: HOSPITAL | Age: 74
Discharge: HOME | End: 2024-07-21
Attending: EMERGENCY MEDICINE | Admitting: EMERGENCY MEDICINE
Payer: COMMERCIAL

## 2024-07-21 ENCOUNTER — APPOINTMENT (OUTPATIENT)
Dept: RADIOLOGY | Facility: HOSPITAL | Age: 74
End: 2024-07-21
Attending: EMERGENCY MEDICINE
Payer: COMMERCIAL

## 2024-07-21 VITALS
BODY MASS INDEX: 28.34 KG/M2 | HEIGHT: 64 IN | OXYGEN SATURATION: 96 % | HEART RATE: 63 BPM | RESPIRATION RATE: 16 BRPM | TEMPERATURE: 98.2 F | SYSTOLIC BLOOD PRESSURE: 175 MMHG | WEIGHT: 166 LBS | DIASTOLIC BLOOD PRESSURE: 76 MMHG

## 2024-07-21 DIAGNOSIS — M19.012 ARTHRITIS OF LEFT SHOULDER REGION: ICD-10-CM

## 2024-07-21 PROBLEM — E11.9 TYPE 2 DIABETES MELLITUS (H): Status: ACTIVE | Noted: 2021-02-17

## 2024-07-21 PROBLEM — E78.5 HYPERLIPIDEMIA: Status: ACTIVE | Noted: 2017-12-21

## 2024-07-21 PROCEDURE — 73060 X-RAY EXAM OF HUMERUS: CPT | Mod: LT

## 2024-07-21 PROCEDURE — 71101 X-RAY EXAM UNILAT RIBS/CHEST: CPT | Mod: LT

## 2024-07-21 PROCEDURE — 250N000013 HC RX MED GY IP 250 OP 250 PS 637: Performed by: EMERGENCY MEDICINE

## 2024-07-21 PROCEDURE — 73030 X-RAY EXAM OF SHOULDER: CPT | Mod: LT

## 2024-07-21 PROCEDURE — 99284 EMERGENCY DEPT VISIT MOD MDM: CPT

## 2024-07-21 RX ORDER — OXYCODONE AND ACETAMINOPHEN 5; 325 MG/1; MG/1
1 TABLET ORAL ONCE
Status: COMPLETED | OUTPATIENT
Start: 2024-07-21 | End: 2024-07-21

## 2024-07-21 RX ORDER — OXYCODONE AND ACETAMINOPHEN 5; 325 MG/1; MG/1
1 TABLET ORAL EVERY 6 HOURS PRN
Qty: 12 TABLET | Refills: 0 | Status: SHIPPED | OUTPATIENT
Start: 2024-07-21 | End: 2024-07-24

## 2024-07-21 RX ADMIN — OXYCODONE HYDROCHLORIDE AND ACETAMINOPHEN 1 TABLET: 5; 325 TABLET ORAL at 14:21

## 2024-07-21 ASSESSMENT — COLUMBIA-SUICIDE SEVERITY RATING SCALE - C-SSRS
6. HAVE YOU EVER DONE ANYTHING, STARTED TO DO ANYTHING, OR PREPARED TO DO ANYTHING TO END YOUR LIFE?: NO
2. HAVE YOU ACTUALLY HAD ANY THOUGHTS OF KILLING YOURSELF IN THE PAST MONTH?: NO
1. IN THE PAST MONTH, HAVE YOU WISHED YOU WERE DEAD OR WISHED YOU COULD GO TO SLEEP AND NOT WAKE UP?: NO

## 2024-07-21 ASSESSMENT — ACTIVITIES OF DAILY LIVING (ADL)
ADLS_ACUITY_SCORE: 36
ADLS_ACUITY_SCORE: 38

## 2024-07-21 NOTE — ED TRIAGE NOTES
Left shoulder pain that started 3 weeks ago, no trauma      Triage Assessment (Adult)       Row Name 07/21/24 6978          Triage Assessment    Airway WDL WDL        Respiratory WDL    Respiratory WDL WDL        Skin Circulation/Temperature WDL    Skin Circulation/Temperature WDL WDL        Cardiac WDL    Cardiac WDL WDL        Peripheral/Neurovascular WDL    Peripheral Neurovascular WDL WDL        Cognitive/Neuro/Behavioral WDL    Cognitive/Neuro/Behavioral WDL WDL

## 2024-07-21 NOTE — DISCHARGE INSTRUCTIONS
Sling for comfort.  However, remove arm from sling 3 times a day and perform a loose range of motion of the wrist, elbow, shoulder to prevent it freezing up.  Ice off-and-on to sore areas whenever hurting.  Over-the-counter Tylenol or ibuprofen every 6 hours as needed and as tolerated.  1 Percocet every 6 hours instead if needed for stronger pain.  Do not drive with this.  Have someone drive you home today.  See your doctor or your clinic this next week for follow-up.  If problem persist, possible orthopedics referral, MRI, or physical therapy.

## 2024-07-21 NOTE — ED PROVIDER NOTES
EMERGENCY DEPARTMENT ENCOUNTER      NAME: Sedrick Murray  AGE: 74 year old male  YOB: 1950  MRN: [id number]  EVALUATION DATE & TIME: 2024 12:49 PM    PCP: Milvia Costa    ED PROVIDER: Flaco Mckinley M.D.      Chief Complaint   Patient presents with    Shoulder Pain         FINAL IMPRESSION:  1.  Acute left shoulder pain/arthritis.      ED COURSE & MEDICAL DECISION MAKIN:11 PM I met with the patient to gather history and to perform my initial exam. We discussed plans for the ED course, including diagnostic testing and treatment. PPE worn: cloth mask.  Patient with left shoulder pain for the last 3 weeks.  No trauma.  Pain has been continuous.  Exam it is exacerbated palpation of the shoulder movement of the left upper arm.  Patient also has some left rib pain.  Gout, hypertension, coronary artery disease, intracranial bleed CVA, CHF  2:14 PM.  No acute fracture dislocations.  Degenerative changes are noted.  Patient received a sling for comfort and dose of pain medication.  Patient discharged home.  Patient in agreement with the plan.    Pertinent Labs & Imaging studies reviewed. (See chart for details)  74 year old male presents to the Emergency Department for evaluation of left shoulder pain.    At the conclusion of the encounter I discussed the results of all of the tests and the disposition. The questions were answered. The patient or family acknowledged understanding and was agreeable with the care plan.              Medical Decision Making  Obtained supplemental history:Supplemental history obtained?: Documented in chart and Family Member/Significant Other  Reviewed external records: External records reviewed?: Documented in chart  Care impacted by chronic illness:.  Care significantly affected by social determinants of health:Access to Medical Care  Did you consider but not order tests?: Work up considered but not performed and documented in chart, if applicable  Did you interpret  images independently?: Independent interpretation of ECG and images noted in documentation, when applicable.  Consultation discussion with other provider:Did you involve another provider (consultant, , pharmacy, etc.)?: No  Dispo discharge home after evaluation.      MEDICATIONS GIVEN IN THE EMERGENCY:  Medications - No data to display    NEW PRESCRIPTIONS STARTED AT TODAY'S ER VISIT  New Prescriptions    No medications on file          =================================================================    HPI    Patient information was obtained from: Patient and wife    Use of : N/A         Sedrick Murray is a 74 year old male with a pertinent history of hypertension, hyperlipidemia, CAD, NSTEMI, T2DM, who presents to this ED via walk-in for evaluation of left shoulder pain.     The patient reports that for the past 3 weeks, he has been experiencing left arm pain from his elbow upward. His pain radiates to his ribcage and prevents him from moving his left arm.    Denies any falls or trauma, chest pain, or any other concerns at this time. He does not identify any waxing or waning symptoms otherwise, exacerbating or alleviating features, associated symptoms except as mentioned. Patient denies any pain related complaints.    REVIEW OF SYSTEMS   Review of Systems   Musculoskeletal:         Positive for left upper arm pain.   All other systems reviewed and are negative.       PAST MEDICAL HISTORY:  Past Medical History:   Diagnosis Date    Anxiety     Back pain     Cerebral vascular accident (H)     Chest pain     Chronic pain     Diarrhea     Dyslipidemia 9/1/2014    Gout     Hemorrhagic stroke (H)     Hypertension        PAST SURGICAL HISTORY:  Past Surgical History:   Procedure Laterality Date    CHOLECYSTECTOMY      CV CORONARY ANGIOGRAM N/A 4/12/2024    Procedure: Coronary Angiogram;  Surgeon: Phu Delgado MD;  Location: [facility] CATH LAB CV    CV LEFT HEART CATH N/A 4/12/2024    Procedure: Left Heart  "Catheterization;  Surgeon: Phu Delgado MD;  Location: [facility] CATH LAB CV    IR MISCELLANEOUS PROCEDURE  3/9/2013    IR MISCELLANEOUS PROCEDURE  3/9/2013           CURRENT MEDICATIONS:    acetaminophen (TYLENOL) 650 MG CR tablet  allopurinol (ZYLOPRIM) 300 MG tablet  alum & mag hydroxide-simethicone (MAALOX) 200-200-20 MG/5ML SUSP suspension  amLODIPine (NORVASC) 10 MG tablet  aspirin 81 MG EC tablet  bumetanide (BUMEX) 1 MG tablet  diclofenac (VOLTAREN) 1 % topical gel  isosorbide mononitrate (IMDUR) 60 MG 24 hr tablet  metoprolol succinate ER (TOPROL XL) 25 MG 24 hr tablet  nitroGLYcerin (NITROSTAT) 0.4 MG sublingual tablet  pantoprazole (PROTONIX) 40 MG EC tablet  rosuvastatin (CRESTOR) 40 MG tablet  spironolactone (ALDACTONE) 25 MG tablet  sucralfate (CARAFATE) 1 GM/10ML suspension  traZODone (DESYREL) 150 MG tablet        ALLERGIES:  Allergies   Allergen Reactions    Dilaudid [Hydromorphone] Unknown    Phenytoin Hives and Itching       FAMILY HISTORY:  Family History   Problem Relation Age of Onset    Cerebrovascular Disease Mother        SOCIAL HISTORY:   Social History     Socioeconomic History    Marital status:    Tobacco Use    Smoking status: Never   Substance and Sexual Activity    Alcohol use: Yes    Drug use: No   Social History Narrative    Patient presented to the ED with his wife 04/25/17       Social Determinants of Health      [facility]    Social Connections     Occasional alcohol use.  No drugs or tobacco.    VITALS:  BP (!) 188/71   Pulse 63   Temp 98.2  F (36.8  C)   Resp 16   Ht 1.626 m (5' 4\")   Wt 75.3 kg (166 lb)   SpO2 95%   BMI 28.49 kg/m      PHYSICAL EXAM    Vital Signs:  BP (!) 188/71   Pulse 63   Temp 98.2  F (36.8  C)   Resp 16   Ht 1.626 m (5' 4\")   Wt 75.3 kg (166 lb)   SpO2 95%   BMI 28.49 kg/m    General:  On entering the room he is in no apparent distress.  "   Neck:  Neck supple with full range of motion and nontender.    Back:  Back and spine are nontender.  No costovertebral angle tenderness.    HEENT:  Oropharynx clear with moist mucous membranes.  HEENT unremarkable.    Pulmonary:  Chest clear to auscultation without rhonchi rales or wheezing.  Pain with palpation to the lateral left ribs.  No palpable fracture, crepitance or step-off.  Cardiovascular:  Cardiac regular rate and rhythm without murmurs rubs or gallops.    Abdomen:  Abdomen soft nontender.  There is no rebound or guarding.    Muskuloskeletal:  He moves all 4 without any difficulty and has normal neurovascular exams.  Extremities without clubbing, cyanosis, or edema.  Legs and calves are nontender.  Full range of motion left arm.  Tenderness in the shoulder humerus and elbow.  Good pulse capillary refill.  Sensation intact soft touch.  Neuro:  He is alert and oriented ×3 and moves all extremities symmetrically.    Psych:  Normal affect.    Skin:  Unremarkable and warm and dry.       LAB:  All pertinent labs reviewed and interpreted.  Labs Ordered and Resulted from Time of ED Arrival to Time of ED Departure - No data to display    RADIOLOGY:  Reviewed all pertinent imaging. Please see official radiology report.  Humerus XR,  G/E 2 views, left   Final Result   IMPRESSION: Mild degenerative changes at the AC joint. Humeral head is normally located. Advanced degenerative changes throughout the elbow. There are several moderate-sized loose bodies present throughout the joint. Moderate-sized olecranon spur. Small    foci of heterotopic ossification in the triceps tendon.         Ribs XR, unilat 3 views + PA chest,  left   Preliminary Result   IMPRESSION: Low lung volumes. No consolidation or pleural fluid. Cardiac enlargement. Normal pulmonary vascularity. Aortic calcification. No rib fractures.            XR Shoulder Left 2 Views   Final Result   IMPRESSION: Mild degenerative changes at the AC joint. Humeral  head is normally located. Advanced degenerative changes throughout the elbow. There are several moderate-sized loose bodies present throughout the joint. Moderate-sized olecranon spur. Small    foci of heterotopic ossification in the triceps tendon.                    EKG:            PROCEDURES:         I, Marge Martinez, am serving as a scribe to document services personally performed by Dr. Mckinley based on my observation and the provider's statements to me. I, Flaco Mckinley MD attest that Marge Martinez is acting in a scribe capacity, has observed my performance of the services and has documented them in accordance with my direction.    Flaco Mckinley M.D.  Emergency Medicine  [facility] EMERGENCY DEPARTMENT  [facility] [facility] [id number] [phone]  Dept: [phone]       Flaco Mckinley MD  07/21/24 [id number]

## 2024-10-13 ENCOUNTER — HOSPITAL ENCOUNTER (INPATIENT)
Facility: HOSPITAL | Age: 74
LOS: 7 days | Discharge: HOME | DRG: 189 | End: 2024-10-21
Attending: EMERGENCY MEDICINE | Admitting: FAMILY MEDICINE
Payer: COMMERCIAL

## 2024-10-13 DIAGNOSIS — R07.9 CHEST PAIN, UNSPECIFIED TYPE: ICD-10-CM

## 2024-10-13 DIAGNOSIS — E11.59 TYPE 2 DIABETES MELLITUS WITH OTHER CIRCULATORY COMPLICATION, WITHOUT LONG-TERM CURRENT USE OF INSULIN (H): ICD-10-CM

## 2024-10-13 DIAGNOSIS — I35.1 AORTIC VALVE INSUFFICIENCY, ETIOLOGY OF CARDIAC VALVE DISEASE UNSPECIFIED: Primary | ICD-10-CM

## 2024-10-13 DIAGNOSIS — I50.31 ACUTE DIASTOLIC HEART FAILURE (H): ICD-10-CM

## 2024-10-13 DIAGNOSIS — R06.03 ACUTE RESPIRATORY DISTRESS: ICD-10-CM
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
## 2024-10-13 DIAGNOSIS — I25.83 CORONARY ARTERY DISEASE DUE TO LIPID RICH PLAQUE: Chronic | ICD-10-CM

## 2024-10-13 DIAGNOSIS — I50.32 CHRONIC DIASTOLIC HEART FAILURE (H): ICD-10-CM

## 2024-10-13 DIAGNOSIS — I25.10 CORONARY ARTERY DISEASE DUE TO LIPID RICH PLAQUE: Chronic | ICD-10-CM

## 2024-10-13 DIAGNOSIS — J18.9 HAP (HOSPITAL-ACQUIRED PNEUMONIA): ICD-10-CM

## 2024-10-13 PROCEDURE — 99285 EMERGENCY DEPT VISIT HI MDM: CPT | Mod: 25

## 2024-10-13 PROCEDURE — 80048 BASIC METABOLIC PNL TOTAL CA: CPT | Performed by: EMERGENCY MEDICINE

## 2024-10-13 PROCEDURE — 83880 ASSAY OF NATRIURETIC PEPTIDE: CPT | Performed by: EMERGENCY MEDICINE

## 2024-10-13 PROCEDURE — 84145 PROCALCITONIN (PCT): CPT | Performed by: EMERGENCY MEDICINE

## 2024-10-13 PROCEDURE — 85027 COMPLETE CBC AUTOMATED: CPT | Performed by: EMERGENCY MEDICINE

## 2024-10-13 PROCEDURE — 85379 FIBRIN DEGRADATION QUANT: CPT | Performed by: EMERGENCY MEDICINE

## 2024-10-13 PROCEDURE — 84484 ASSAY OF TROPONIN QUANT: CPT | Performed by: EMERGENCY MEDICINE

## 2024-10-13 PROCEDURE — 36415 COLL VENOUS BLD VENIPUNCTURE: CPT | Performed by: EMERGENCY MEDICINE

## 2024-10-13 PROCEDURE — 83735 ASSAY OF MAGNESIUM: CPT | Performed by: EMERGENCY MEDICINE

## 2024-10-13 RX ORDER — IPRATROPIUM BROMIDE AND ALBUTEROL SULFATE 2.5; .5 MG/3ML; MG/3ML
3 SOLUTION RESPIRATORY (INHALATION)
Status: COMPLETED | OUTPATIENT
Start: 2024-10-14 | End: 2024-10-14

## 2024-10-13 ASSESSMENT — COLUMBIA-SUICIDE SEVERITY RATING SCALE - C-SSRS
2. HAVE YOU ACTUALLY HAD ANY THOUGHTS OF KILLING YOURSELF IN THE PAST MONTH?: NO
6. HAVE YOU EVER DONE ANYTHING, STARTED TO DO ANYTHING, OR PREPARED TO DO ANYTHING TO END YOUR LIFE?: NO
1. IN THE PAST MONTH, HAVE YOU WISHED YOU WERE DEAD OR WISHED YOU COULD GO TO SLEEP AND NOT WAKE UP?: NO

## 2024-10-14 ENCOUNTER — APPOINTMENT (OUTPATIENT)
Dept: CT IMAGING | Facility: HOSPITAL | Age: 74
DRG: 189 | End: 2024-10-14
Attending: EMERGENCY MEDICINE
Payer: COMMERCIAL

## 2024-10-14 ENCOUNTER — APPOINTMENT (OUTPATIENT)
Dept: CARDIOLOGY | Facility: HOSPITAL | Age: 74
DRG: 189 | End: 2024-10-14
Attending: FAMILY MEDICINE
Payer: COMMERCIAL

## 2024-10-14 ENCOUNTER — APPOINTMENT (OUTPATIENT)
Dept: RADIOLOGY | Facility: HOSPITAL | Age: 74
DRG: 189 | End: 2024-10-14
Attending: EMERGENCY MEDICINE
Payer: COMMERCIAL

## 2024-10-14 PROBLEM — R06.03 ACUTE RESPIRATORY DISTRESS: Status: ACTIVE | Noted: 2024-10-14

## 2024-10-14 LAB
ANION GAP SERPL CALCULATED.3IONS-SCNC: 15 MMOL/L (ref 7–15)
ANION GAP SERPL CALCULATED.3IONS-SCNC: 9 MMOL/L (ref 7–15)
ATRIAL RATE - MUSE: 91 BPM
BASE EXCESS BLDV CALC-SCNC: 1.1 MMOL/L (ref -3–3)
BASOPHILS # BLD AUTO: 0 10E3/UL (ref 0–0.2)
BASOPHILS NFR BLD AUTO: 0 %
BUN SERPL-MCNC: 22.9 MG/DL (ref 8–23)
BUN SERPL-MCNC: 25.1 MG/DL (ref 8–23)
CALCIUM SERPL-MCNC: 8.5 MG/DL (ref 8.8–10.4)
CALCIUM SERPL-MCNC: 9.1 MG/DL (ref 8.8–10.4)
CHLORIDE SERPL-SCNC: 101 MMOL/L (ref 98–107)
CHLORIDE SERPL-SCNC: 97 MMOL/L (ref 98–107)
CREAT SERPL-MCNC: 0.94 MG/DL (ref 0.67–1.17)
CREAT SERPL-MCNC: 1.14 MG/DL (ref 0.67–1.17)
D DIMER PPP FEU-MCNC: 0.84 UG/ML FEU (ref 0–0.5)
DIASTOLIC BLOOD PRESSURE - MUSE: NORMAL MMHG
EGFRCR SERPLBLD CKD-EPI 2021: 68 ML/MIN/1.73M2
EGFRCR SERPLBLD CKD-EPI 2021: 86 ML/MIN/1.73M2
EOSINOPHIL # BLD AUTO: 0 10E3/UL (ref 0–0.7)
EOSINOPHIL NFR BLD AUTO: 1 %
ERYTHROCYTE [DISTWIDTH] IN BLOOD BY AUTOMATED COUNT: 13.6 % (ref 10–15)
ERYTHROCYTE [DISTWIDTH] IN BLOOD BY AUTOMATED COUNT: 13.8 % (ref 10–15)
EST. AVERAGE GLUCOSE BLD GHB EST-MCNC: 160 MG/DL
FLUAV RNA SPEC QL NAA+PROBE: NEGATIVE
FLUBV RNA RESP QL NAA+PROBE: NEGATIVE
GLUCOSE BLDC GLUCOMTR-MCNC: 139 MG/DL (ref 70–99)
GLUCOSE BLDC GLUCOMTR-MCNC: 182 MG/DL (ref 70–99)
GLUCOSE BLDC GLUCOMTR-MCNC: 211 MG/DL (ref 70–99)
GLUCOSE BLDC GLUCOMTR-MCNC: 287 MG/DL (ref 70–99)
GLUCOSE BLDC GLUCOMTR-MCNC: 311 MG/DL (ref 70–99)
GLUCOSE SERPL-MCNC: 143 MG/DL (ref 70–99)
GLUCOSE SERPL-MCNC: 176 MG/DL (ref 70–99)
HBA1C MFR BLD: 7.2 %
HCO3 BLDV-SCNC: 27 MMOL/L (ref 21–28)
HCO3 SERPL-SCNC: 24 MMOL/L (ref 22–29)
HCO3 SERPL-SCNC: 25 MMOL/L (ref 22–29)
HCT VFR BLD AUTO: 45 % (ref 40–53)
HCT VFR BLD AUTO: 48.3 % (ref 40–53)
HGB BLD-MCNC: 14.3 G/DL (ref 13.3–17.7)
HGB BLD-MCNC: 15.6 G/DL (ref 13.3–17.7)
HOLD SPECIMEN: NORMAL
IMM GRANULOCYTES # BLD: 0 10E3/UL
IMM GRANULOCYTES NFR BLD: 1 %
INTERPRETATION ECG - MUSE: NORMAL
LVEF ECHO: NORMAL
LYMPHOCYTES # BLD AUTO: 0.5 10E3/UL (ref 0.8–5.3)
LYMPHOCYTES NFR BLD AUTO: 9 %
MAGNESIUM SERPL-MCNC: 1.7 MG/DL (ref 1.7–2.3)
MCH RBC QN AUTO: 29.6 PG (ref 26.5–33)
MCH RBC QN AUTO: 30 PG (ref 26.5–33)
MCHC RBC AUTO-ENTMCNC: 31.8 G/DL (ref 31.5–36.5)
MCHC RBC AUTO-ENTMCNC: 32.3 G/DL (ref 31.5–36.5)
MCV RBC AUTO: 93 FL (ref 78–100)
MCV RBC AUTO: 93 FL (ref 78–100)
MONOCYTES # BLD AUTO: 0.2 10E3/UL (ref 0–1.3)
MONOCYTES NFR BLD AUTO: 4 %
NEUTROPHILS # BLD AUTO: 5 10E3/UL (ref 1.6–8.3)
NEUTROPHILS NFR BLD AUTO: 86 %
NRBC # BLD AUTO: 0 10E3/UL
NRBC BLD AUTO-RTO: 0 /100
NT-PROBNP SERPL-MCNC: 504 PG/ML (ref 0–900)
O2/TOTAL GAS SETTING VFR VENT: 40 %
OXYHGB MFR BLDV: 80 % (ref 70–75)
P AXIS - MUSE: 11 DEGREES
PCO2 BLDV: 49 MM HG (ref 40–50)
PH BLDV: 7.36 [PH] (ref 7.32–7.43)
PLATELET # BLD AUTO: 142 10E3/UL (ref 150–450)
PLATELET # BLD AUTO: 150 10E3/UL (ref 150–450)
PO2 BLDV: 48 MM HG (ref 25–47)
POTASSIUM SERPL-SCNC: 3.8 MMOL/L (ref 3.4–5.3)
POTASSIUM SERPL-SCNC: 4.2 MMOL/L (ref 3.4–5.3)
PR INTERVAL - MUSE: 168 MS
PROCALCITONIN SERPL IA-MCNC: 0.09 NG/ML
QRS DURATION - MUSE: 82 MS
QT - MUSE: 394 MS
QTC - MUSE: 484 MS
R AXIS - MUSE: -8 DEGREES
RBC # BLD AUTO: 4.83 10E6/UL (ref 4.4–5.9)
RBC # BLD AUTO: 5.2 10E6/UL (ref 4.4–5.9)
RSV RNA SPEC NAA+PROBE: NEGATIVE
SAO2 % BLDV: 80.4 % (ref 70–75)
SARS-COV-2 RNA RESP QL NAA+PROBE: NEGATIVE
SODIUM SERPL-SCNC: 135 MMOL/L (ref 135–145)
SODIUM SERPL-SCNC: 136 MMOL/L (ref 135–145)
SYSTOLIC BLOOD PRESSURE - MUSE: NORMAL MMHG
T AXIS - MUSE: 51 DEGREES
TROPONIN T SERPL HS-MCNC: 36 NG/L
TROPONIN T SERPL HS-MCNC: 42 NG/L
VENTRICULAR RATE- MUSE: 91 BPM
WBC # BLD AUTO: 5.8 10E3/UL (ref 4–11)
WBC # BLD AUTO: 7.1 10E3/UL (ref 4–11)

## 2024-10-14 PROCEDURE — 120N000004 HC R&B MS OVERFLOW

## 2024-10-14 PROCEDURE — 999N000157 HC STATISTIC RCP TIME EA 10 MIN

## 2024-10-14 PROCEDURE — 94640 AIRWAY INHALATION TREATMENT: CPT | Mod: 76

## 2024-10-14 PROCEDURE — 999N000185 HC STATISTIC TRANSPORT TIME EA 15 MIN

## 2024-10-14 PROCEDURE — 250N000011 HC RX IP 250 OP 636: Performed by: INTERNAL MEDICINE

## 2024-10-14 PROCEDURE — 250N000013 HC RX MED GY IP 250 OP 250 PS 637: Performed by: FAMILY MEDICINE

## 2024-10-14 PROCEDURE — 85025 COMPLETE CBC W/AUTO DIFF WBC: CPT | Performed by: FAMILY MEDICINE

## 2024-10-14 PROCEDURE — 250N000013 HC RX MED GY IP 250 OP 250 PS 637: Performed by: EMERGENCY MEDICINE

## 2024-10-14 PROCEDURE — 93306 TTE W/DOPPLER COMPLETE: CPT | Mod: 26 | Performed by: STUDENT IN AN ORGANIZED HEALTH CARE EDUCATION/TRAINING PROGRAM

## 2024-10-14 PROCEDURE — 250N000013 HC RX MED GY IP 250 OP 250 PS 637: Performed by: INTERNAL MEDICINE

## 2024-10-14 PROCEDURE — 82962 GLUCOSE BLOOD TEST: CPT

## 2024-10-14 PROCEDURE — 94660 CPAP INITIATION&MGMT: CPT

## 2024-10-14 PROCEDURE — 94640 AIRWAY INHALATION TREATMENT: CPT

## 2024-10-14 PROCEDURE — 250N000009 HC RX 250: Performed by: EMERGENCY MEDICINE

## 2024-10-14 PROCEDURE — 87637 SARSCOV2&INF A&B&RSV AMP PRB: CPT | Performed by: EMERGENCY MEDICINE

## 2024-10-14 PROCEDURE — 82805 BLOOD GASES W/O2 SATURATION: CPT | Performed by: EMERGENCY MEDICINE

## 2024-10-14 PROCEDURE — 250N000012 HC RX MED GY IP 250 OP 636 PS 637: Performed by: FAMILY MEDICINE

## 2024-10-14 PROCEDURE — 250N000011 HC RX IP 250 OP 636: Performed by: EMERGENCY MEDICINE

## 2024-10-14 PROCEDURE — 36415 COLL VENOUS BLD VENIPUNCTURE: CPT | Performed by: FAMILY MEDICINE

## 2024-10-14 PROCEDURE — 93005 ELECTROCARDIOGRAM TRACING: CPT | Performed by: EMERGENCY MEDICINE

## 2024-10-14 PROCEDURE — 93306 TTE W/DOPPLER COMPLETE: CPT

## 2024-10-14 PROCEDURE — 83036 HEMOGLOBIN GLYCOSYLATED A1C: CPT | Performed by: FAMILY MEDICINE

## 2024-10-14 PROCEDURE — 84484 ASSAY OF TROPONIN QUANT: CPT | Performed by: EMERGENCY MEDICINE

## 2024-10-14 PROCEDURE — 250N000011 HC RX IP 250 OP 636: Performed by: FAMILY MEDICINE

## 2024-10-14 PROCEDURE — 36415 COLL VENOUS BLD VENIPUNCTURE: CPT | Performed by: EMERGENCY MEDICINE

## 2024-10-14 PROCEDURE — 80048 BASIC METABOLIC PNL TOTAL CA: CPT | Performed by: FAMILY MEDICINE

## 2024-10-14 PROCEDURE — 71275 CT ANGIOGRAPHY CHEST: CPT

## 2024-10-14 PROCEDURE — 99222 1ST HOSP IP/OBS MODERATE 55: CPT | Performed by: FAMILY MEDICINE

## 2024-10-14 PROCEDURE — 272N000272 HC CONTINUOUS NEBULIZER MICRO PUMP

## 2024-10-14 PROCEDURE — 99207 PR NO BILLABLE SERVICE THIS VISIT: CPT | Performed by: INTERNAL MEDICINE

## 2024-10-14 PROCEDURE — 71045 X-RAY EXAM CHEST 1 VIEW: CPT

## 2024-10-14 RX ORDER — NALOXONE HYDROCHLORIDE 0.4 MG/ML
0.4 INJECTION, SOLUTION INTRAMUSCULAR; INTRAVENOUS; SUBCUTANEOUS
Status: DISCONTINUED | OUTPATIENT
Start: 2024-10-14 | End: 2024-10-21 | Stop reason: HOSPADM

## 2024-10-14 RX ORDER — IPRATROPIUM BROMIDE AND ALBUTEROL SULFATE 2.5; .5 MG/3ML; MG/3ML
3 SOLUTION RESPIRATORY (INHALATION)
Status: DISCONTINUED | OUTPATIENT
Start: 2024-10-14 | End: 2024-10-14

## 2024-10-14 RX ORDER — BUMETANIDE 1 MG/1
1 TABLET ORAL DAILY
Status: DISCONTINUED | OUTPATIENT
Start: 2024-10-14 | End: 2024-10-21 | Stop reason: HOSPADM

## 2024-10-14 RX ORDER — PANTOPRAZOLE SODIUM 40 MG/1
40 TABLET, DELAYED RELEASE ORAL
Status: DISCONTINUED | OUTPATIENT
Start: 2024-10-14 | End: 2024-10-21 | Stop reason: HOSPADM

## 2024-10-14 RX ORDER — ACETAMINOPHEN 325 MG/1
650 TABLET ORAL EVERY 4 HOURS PRN
Status: DISCONTINUED | OUTPATIENT
Start: 2024-10-14 | End: 2024-10-21 | Stop reason: HOSPADM

## 2024-10-14 RX ORDER — ISOSORBIDE MONONITRATE 30 MG/1
30 TABLET, EXTENDED RELEASE ORAL DAILY
Status: DISCONTINUED | OUTPATIENT
Start: 2024-10-14 | End: 2024-10-21 | Stop reason: HOSPADM

## 2024-10-14 RX ORDER — METHYLPREDNISOLONE SODIUM SUCCINATE 40 MG/ML
40 INJECTION INTRAMUSCULAR; INTRAVENOUS EVERY 12 HOURS
Status: DISCONTINUED | OUTPATIENT
Start: 2024-10-14 | End: 2024-10-15

## 2024-10-14 RX ORDER — ALLOPURINOL 300 MG/1
300 TABLET ORAL 2 TIMES DAILY
Status: DISCONTINUED | OUTPATIENT
Start: 2024-10-14 | End: 2024-10-21 | Stop reason: HOSPADM

## 2024-10-14 RX ORDER — LOSARTAN POTASSIUM 100 MG/1
100 TABLET ORAL DAILY
COMMUNITY
Start: 2024-09-23

## 2024-10-14 RX ORDER — IOPAMIDOL 755 MG/ML
90 INJECTION, SOLUTION INTRAVASCULAR ONCE
Status: COMPLETED | OUTPATIENT
Start: 2024-10-14 | End: 2024-10-14

## 2024-10-14 RX ORDER — METHYLPREDNISOLONE SODIUM SUCCINATE 40 MG/ML
40 INJECTION INTRAMUSCULAR; INTRAVENOUS EVERY 8 HOURS
Status: DISCONTINUED | OUTPATIENT
Start: 2024-10-14 | End: 2024-10-14

## 2024-10-14 RX ORDER — CHOLECALCIFEROL (VITAMIN D3) 25 MCG
25 TABLET ORAL DAILY
COMMUNITY
Start: 2024-09-23

## 2024-10-14 RX ORDER — IPRATROPIUM BROMIDE AND ALBUTEROL SULFATE 2.5; .5 MG/3ML; MG/3ML
3 SOLUTION RESPIRATORY (INHALATION)
Status: DISCONTINUED | OUTPATIENT
Start: 2024-10-14 | End: 2024-10-21 | Stop reason: HOSPADM

## 2024-10-14 RX ORDER — SPIRONOLACTONE 25 MG/1
25 TABLET ORAL DAILY
Status: DISCONTINUED | OUTPATIENT
Start: 2024-10-14 | End: 2024-10-21 | Stop reason: HOSPADM

## 2024-10-14 RX ORDER — NALOXONE HYDROCHLORIDE 0.4 MG/ML
0.2 INJECTION, SOLUTION INTRAMUSCULAR; INTRAVENOUS; SUBCUTANEOUS
Status: DISCONTINUED | OUTPATIENT
Start: 2024-10-14 | End: 2024-10-21 | Stop reason: HOSPADM

## 2024-10-14 RX ORDER — ROSUVASTATIN CALCIUM 40 MG/1
40 TABLET, COATED ORAL DAILY
Status: DISCONTINUED | OUTPATIENT
Start: 2024-10-14 | End: 2024-10-21 | Stop reason: HOSPADM

## 2024-10-14 RX ORDER — AMLODIPINE BESYLATE 5 MG/1
10 TABLET ORAL DAILY
Status: DISCONTINUED | OUTPATIENT
Start: 2024-10-14 | End: 2024-10-21 | Stop reason: HOSPADM

## 2024-10-14 RX ORDER — NICOTINE POLACRILEX 4 MG
15-30 LOZENGE BUCCAL
Status: DISCONTINUED | OUTPATIENT
Start: 2024-10-14 | End: 2024-10-21 | Stop reason: HOSPADM

## 2024-10-14 RX ORDER — LIDOCAINE 40 MG/G
CREAM TOPICAL
Status: DISCONTINUED | OUTPATIENT
Start: 2024-10-14 | End: 2024-10-21 | Stop reason: HOSPADM

## 2024-10-14 RX ORDER — MORPHINE SULFATE 2 MG/ML
2 INJECTION, SOLUTION INTRAMUSCULAR; INTRAVENOUS
Status: DISCONTINUED | OUTPATIENT
Start: 2024-10-14 | End: 2024-10-21 | Stop reason: HOSPADM

## 2024-10-14 RX ORDER — VITAMIN B COMPLEX
25 TABLET ORAL DAILY
Status: DISCONTINUED | OUTPATIENT
Start: 2024-10-14 | End: 2024-10-21 | Stop reason: HOSPADM

## 2024-10-14 RX ORDER — OXYCODONE HYDROCHLORIDE 5 MG/1
5 TABLET ORAL EVERY 4 HOURS PRN
Status: DISCONTINUED | OUTPATIENT
Start: 2024-10-14 | End: 2024-10-21 | Stop reason: HOSPADM

## 2024-10-14 RX ORDER — ASPIRIN 81 MG/1
324 TABLET, CHEWABLE ORAL ONCE
Status: COMPLETED | OUTPATIENT
Start: 2024-10-14 | End: 2024-10-14

## 2024-10-14 RX ORDER — DEXTROSE MONOHYDRATE 25 G/50ML
25-50 INJECTION, SOLUTION INTRAVENOUS
Status: DISCONTINUED | OUTPATIENT
Start: 2024-10-14 | End: 2024-10-21 | Stop reason: HOSPADM

## 2024-10-14 RX ORDER — NITROGLYCERIN 0.4 MG/1
0.4 TABLET SUBLINGUAL EVERY 5 MIN PRN
Status: DISCONTINUED | OUTPATIENT
Start: 2024-10-14 | End: 2024-10-15

## 2024-10-14 RX ADMIN — IPRATROPIUM BROMIDE AND ALBUTEROL SULFATE 3 ML: .5; 3 SOLUTION RESPIRATORY (INHALATION) at 00:15

## 2024-10-14 RX ADMIN — PANTOPRAZOLE SODIUM 40 MG: 40 TABLET, DELAYED RELEASE ORAL at 07:49

## 2024-10-14 RX ADMIN — Medication 25 MCG: at 07:49

## 2024-10-14 RX ADMIN — IPRATROPIUM BROMIDE AND ALBUTEROL SULFATE 3 ML: .5; 3 SOLUTION RESPIRATORY (INHALATION) at 00:29

## 2024-10-14 RX ADMIN — AMLODIPINE BESYLATE 10 MG: 5 TABLET ORAL at 07:49

## 2024-10-14 RX ADMIN — PANTOPRAZOLE SODIUM 40 MG: 40 TABLET, DELAYED RELEASE ORAL at 16:06

## 2024-10-14 RX ADMIN — MORPHINE SULFATE 2 MG: 2 INJECTION, SOLUTION INTRAMUSCULAR; INTRAVENOUS at 03:36

## 2024-10-14 RX ADMIN — IPRATROPIUM BROMIDE AND ALBUTEROL SULFATE 3 ML: .5; 3 SOLUTION RESPIRATORY (INHALATION) at 15:59

## 2024-10-14 RX ADMIN — IPRATROPIUM BROMIDE AND ALBUTEROL SULFATE 3 ML: .5; 3 SOLUTION RESPIRATORY (INHALATION) at 20:46

## 2024-10-14 RX ADMIN — IPRATROPIUM BROMIDE AND ALBUTEROL SULFATE 3 ML: .5; 3 SOLUTION RESPIRATORY (INHALATION) at 07:33

## 2024-10-14 RX ADMIN — IOPAMIDOL 90 ML: 755 INJECTION, SOLUTION INTRAVENOUS at 01:46

## 2024-10-14 RX ADMIN — IPRATROPIUM BROMIDE AND ALBUTEROL SULFATE 3 ML: .5; 3 SOLUTION RESPIRATORY (INHALATION) at 12:25

## 2024-10-14 RX ADMIN — INSULIN ASPART 2 UNITS: 100 INJECTION, SOLUTION INTRAVENOUS; SUBCUTANEOUS at 12:35

## 2024-10-14 RX ADMIN — IPRATROPIUM BROMIDE AND ALBUTEROL SULFATE 3 ML: .5; 3 SOLUTION RESPIRATORY (INHALATION) at 00:10

## 2024-10-14 RX ADMIN — ISOSORBIDE MONONITRATE 30 MG: 30 TABLET, EXTENDED RELEASE ORAL at 09:42

## 2024-10-14 RX ADMIN — ALLOPURINOL 300 MG: 300 TABLET ORAL at 19:49

## 2024-10-14 RX ADMIN — METHYLPREDNISOLONE SODIUM SUCCINATE 40 MG: 40 INJECTION, POWDER, FOR SOLUTION INTRAMUSCULAR; INTRAVENOUS at 03:43

## 2024-10-14 RX ADMIN — ROSUVASTATIN CALCIUM 40 MG: 40 TABLET, FILM COATED ORAL at 07:50

## 2024-10-14 RX ADMIN — BUMETANIDE 1 MG: 1 TABLET ORAL at 07:49

## 2024-10-14 RX ADMIN — OXYCODONE HYDROCHLORIDE 5 MG: 5 TABLET ORAL at 16:20

## 2024-10-14 RX ADMIN — INSULIN ASPART 4 UNITS: 100 INJECTION, SOLUTION INTRAVENOUS; SUBCUTANEOUS at 16:44

## 2024-10-14 RX ADMIN — ASPIRIN 81 MG CHEWABLE TABLET 324 MG: 81 TABLET CHEWABLE at 01:05

## 2024-10-14 RX ADMIN — SPIRONOLACTONE 25 MG: 25 TABLET, FILM COATED ORAL at 07:49

## 2024-10-14 RX ADMIN — METHYLPREDNISOLONE SODIUM SUCCINATE 40 MG: 40 INJECTION, POWDER, FOR SOLUTION INTRAMUSCULAR; INTRAVENOUS at 16:06

## 2024-10-14 RX ADMIN — OXYCODONE HYDROCHLORIDE 5 MG: 5 TABLET ORAL at 23:28

## 2024-10-14 RX ADMIN — ALLOPURINOL 300 MG: 300 TABLET ORAL at 07:49

## 2024-10-14 ASSESSMENT — ACTIVITIES OF DAILY LIVING (ADL)
ADLS_ACUITY_SCORE: 38
ADLS_ACUITY_SCORE: 40
ADLS_ACUITY_SCORE: 38
ADLS_ACUITY_SCORE: 40
ADLS_ACUITY_SCORE: 38
ADLS_ACUITY_SCORE: 33
ADLS_ACUITY_SCORE: 38
ADLS_ACUITY_SCORE: 33
ADLS_ACUITY_SCORE: 38
ADLS_ACUITY_SCORE: 33
ADLS_ACUITY_SCORE: 38
ADLS_ACUITY_SCORE: 33
DEPENDENT_IADLS:: CLEANING;COOKING;LAUNDRY;SHOPPING;MEAL PREPARATION;MEDICATION MANAGEMENT;MONEY MANAGEMENT
ADLS_ACUITY_SCORE: 33
ADLS_ACUITY_SCORE: 38
ADLS_ACUITY_SCORE: 38

## 2024-10-14 NOTE — H&P
[facility]    History and Physical - Hospitalist Service       Date of Admission:  10/14/2024    Assessment & Plan      Sedrick Murray is a 74 year old male with PMH significant for CAD/non-STEMI, HFpEF, hypertension, hyperlipidemia, DM 2, ischemic CVA, hemorrhagic CVA and aortic valve insufficiency who is admitted on 10/14/2024 due to shortness of breath and chest pain.    # Dyspnea- Admit patient. Chest x-ray negative.  CTA chest negative for PE and lungs are clear.  SARS Cov, flu A&B, RSV all negative.  BNP within normal limits.  Mild chronic troponin elevation. Ddx: Possible bronchitis.  Per ED physician, clinically improved after DuoNeb treatment.  Add Solu-Medrol.  Currently on BiPAP.  Follow-up echocardiogram.  Continue telemetry monitoring.  Monitor vital signs closely.    # Chest Pain- Troponin= 42-->36. History of CAD with previous NSTEMI. Continue telemetry monitoring. Follow up echocardiogram. PRN NTG and IV Morphine. Consult Cardiology. (Unable to have antiplatelet therapy due to history of hemorrhagic CVA.)    # Elevated D-dimer- CT chest negative for PE.    # DM2- Check HBA1C. Place on ISS and monitor accuchecks.    # HTN- BP labile.  Continue home dose of losartan and amlodipine.  Monitor vitals closely.    # HFpEF- No acute exacerbation.  Lungs clear on CT chest.  BNP within normal limits.  EF = 50-60% on 4/27/2024.  Follow-up echocardiogram.  Continue home dose of Bumex.        Diet: Combination Diet Low Consistent Carb (45 g CHO per Meal) Diet; Low Saturated Fat Na <2400mg Diet, No Caffeine Diet  DVT Prophylaxis: Pneumatic Compression Devices  Eugene Catheter: Not present  Lines: None     Cardiac Monitoring: ACTIVE order. Indication: Chest pain/ ACS rule out (24 hours)  Code Status: Full Code per patient.    Clinically Significant Risk Factors Present on Admission          # Hypochloremia: Lowest Cl = 97 mmol/L in last 2 days, will monitor as appropriate          #  "Hypertension: Noted on problem list        # DMII: A1C = N/A within past 6 months    # Obesity: Estimated body mass index is 31.71 kg/m  as calculated from the following:    Height as of this encounter: 1.6 m (5' 3\").    Weight as of this encounter: 81.2 kg (179 lb).              Disposition Plan   Anticipate greater than 2 midnight inpatient hospital stay.         Belen Wiley MD  Hospitalist Service  [facility]  Securely message with [employer] (Inspect Manager) (more info)  Text page via [employer] Paging/Directory     ______________________________________________________________________    Chief Complaint   Shortness of breath, chest pain    History is obtained from the patient ( via [employer] language line ), ED physician and chart review.    History of Present Illness   Sedrick Murray is a 74 year old male with PMH significant for CAD/non-STEMI, HFpEF, hypertension, hyperlipidemia, DM 2, ischemic CVA, hemorrhagic CVA, aortic valve insufficiency, gout and anxiety who presents to ED due to shortness of breath and chest pain.  Patient is able to provide limited history due to current BiPAP in place.  However, he is able to nod his head \"yes\" or \"no\" and give brief responses.  Patient reportedly developed shortness of breath yesterday afternoon around 3 PM.  Associated symptoms include cough, subjective fevers and chest pain yesterday.  The chest pain is currently rated 7-8/10.  Per ED physician, patient arrived tachypneic, tachycardic and audibly wheezing.  There was no hypoxia.  He was given DuoNeb treatment x 3 and placed on BiPAP.  Patient feels that his breathing is improved compared to arrival but not get at baseline.  Patient is lethargic but otherwise appears in no acute distress.      Past Medical History    Past Medical History:   Diagnosis Date    Anxiety     Back pain     Cerebral vascular accident (H)     Chest pain     Chronic pain     Diarrhea     Dyslipidemia 9/1/2014    Gout     Hemorrhagic " stroke (H)     Hypertension        Past Surgical History   Past Surgical History:   Procedure Laterality Date    CHOLECYSTECTOMY      CV CORONARY ANGIOGRAM N/A 4/12/2024    Procedure: Coronary Angiogram;  Surgeon: Phu Delgado MD;  Location: [facility] CATH LAB CV    CV LEFT HEART CATH N/A 4/12/2024    Procedure: Left Heart Catheterization;  Surgeon: Phu Delgado MD;  Location: [facility] CATH LAB CV    IR MISCELLANEOUS PROCEDURE  3/9/2013    IR MISCELLANEOUS PROCEDURE  3/9/2013       Prior to Admission Medications   Prior to Admission Medications   Prescriptions Last Dose Informant Patient Reported? Taking?   VITAMIN D3 25 MCG (1000 UT) tablet 10/13/2024 at am Self Yes Yes   Sig: Take 25 mcg by mouth daily.   allopurinol (ZYLOPRIM) 300 MG tablet 10/13/2024 at pm Self, Spouse/Significant Other Yes Yes   Sig: Take 300 mg by mouth 2 times daily   amLODIPine (NORVASC) 10 MG tablet 10/13/2024 at am Self, Spouse/Significant Other Yes Yes   Sig: Take 10 mg by mouth daily   bumetanide (BUMEX) 1 MG tablet 10/13/2024 at am Self, Spouse/Significant Other No Yes   Sig: Take 1 tablet (1 mg) by mouth daily   losartan (COZAAR) 100 MG tablet 10/13/2024 at am Self Yes Yes   Sig: Take 100 mg by mouth daily.   nitroGLYcerin (NITROSTAT) 0.4 MG sublingual tablet Unknown at prn Self No Yes   Sig: For chest pain place 1 tablet under the tongue every 5 minutes for 3 doses. If symptoms persist 5 minutes after 1st dose call 911.   pantoprazole (PROTONIX) 40 MG EC tablet 10/13/2024 at pm Self No Yes   Sig: Take 1 tablet (40 mg) by mouth 2 times daily (before meals)   rosuvastatin (CRESTOR) 40 MG tablet 10/13/2024 at am Self, Spouse/Significant Other Yes Yes   Sig: Take 40 mg by mouth daily   spironolactone (ALDACTONE) 25 MG tablet 10/13/2024 at am Self, Spouse/Significant Other No Yes   Sig: Take 1 tablet (25 mg) by mouth daily      Facility-Administered Medications: None        Review of Systems    The 10 point Review of Systems is  negative other than noted in the HPI.    Social History   I have reviewed this patient's social history and updated it with pertinent information if needed.  Social History     Tobacco Use    Smoking status: Never   Substance Use Topics    Alcohol use: Yes    Drug use: No         Family History   I have reviewed this patient's family history and updated it with pertinent information if needed.  Family History   Problem Relation Age of Onset    Cerebrovascular Disease Mother         Physical Exam   Vital Signs: Temp: 98.3  F (36.8  C)   BP: (!) 157/70 Pulse: 86   Resp: 19 SpO2: 99 % O2 Device: BiPAP/CPAP    Weight: 179 lbs 0 oz    General Appearance: Lethargic but easily arousable, on BiPAP  Respiratory: On BiPAP  Cardiovascular: Regular rate, S1-S2  GI: +BS, soft, nontender, nondistended  Skin: No cyanosis, no jaundice, scattered eschar on lower extremities bilaterally.  Other: No pedal edema appreciated.    Medical Decision Making       55 MINUTES SPENT BY ME on the date of service doing chart review, history, exam, documentation & further activities per the note.      Data     I have personally reviewed the following data over the past 24 hrs:    7.1  \   15.6   / 150     136 97 (L) 25.1 (H) /  143 (H)   3.8 24 1.14 \     Trop: 36 (H) BNP: 504     Procal: 0.09 CRP: N/A Lactic Acid: N/A       INR:  N/A PTT:  N/A   D-dimer:  0.84 (H) Fibrinogen:  N/A       Imaging results reviewed over the past 24 hrs:   Recent Results (from the past 24 hour(s))   XR Chest Port 1 View    Narrative    EXAM: XR CHEST PORT 1 VIEW  LOCATION: [facility]  DATE: 10/14/2024    INDICATION: Shortness of breath.  COMPARISON: 7/21/2024.      Impression    IMPRESSION: Normal heart size and pulmonary vascularity. No acute infiltrates or consolidation. Aortic calcification. Hypertrophic changes thoracic spine. Otherwise, unremarkable. No acute findings.   CT Chest Pulmonary Embolism w Contrast    Narrative    EXAM: CT  CHEST PULMONARY EMBOLISM W CONTRAST  LOCATION: [facility]  DATE: 10/14/2024    INDICATION: Shortness of breath. Chest pain. Elevated d-dimer.  COMPARISON: CT pulmonary angiogram 4/11/2024.  TECHNIQUE: CT chest pulmonary angiogram during arterial phase injection of IV contrast. Multiplanar reformats and MIP reconstructions were performed. Dose reduction techniques were used.   CONTRAST: 90 mL Isovue-370 IV.    FINDINGS:  ANGIOGRAM CHEST: Several images are degraded due to motion artifact. No pulmonary emboli on either side. Ectatic ascending thoracic segment measuring 3.8 x 3.9 cm (image 120, series 4), unchanged. No dissection. No CT evidence of right heart strain.    LUNGS AND PLEURA: Motion artifact degrades several images. Minor dependent atelectasis in the posterior lungs. No pleural effusion on either side. No significant change.    MEDIASTINUM/AXILLAE: Cardiac size approaches upper limits of normal. Mild coronary artery calcifications. No pericardial effusion. No suspicious adenopathy. Trachea is midline.    CORONARY ARTERY CALCIFICATION: Mild.    UPPER ABDOMEN: Cholecystectomy clips. Partially visualized atherosclerotic plaque within the SMA, better demonstrated on prior study.    MUSCULOSKELETAL: Degenerative changes both shoulders and the thoracic spine.      Impression    IMPRESSION:  1.  No pulmonary emboli on either side. Lungs are clear. No adenopathy or effusion.    2.  Cardiac size approaches upper limits of normal. Mild coronary artery calcifications. Ectatic ascending thoracic segment. No dissection.    3.  Partially visualized atherosclerotic plaque within the SMA, better demonstrated on prior study.    4.  Cholecystectomy clips.

## 2024-10-14 NOTE — ED NOTES
Pt was taken off BIPAP briefly to take oral ASA. Pt was able to take medication fine but did not last too long off BIPAP before getting SOB and wheezing; work of breathing notably increased. BIPAP placed back on the pt.    Dr. Douglas updated regarding these findings.

## 2024-10-14 NOTE — PROGRESS NOTES
Patient seen by Dr. Orozco this morning.  Wife at bedside.  Chart reviewed.  Patient is on BiPAP.  Lung sounds are clear.  Nurse requested to wean off the BiPAP.  Will de-escalate steroids to twice daily.  Suspicion for CHF given normal pro BNP.  DC cardiology consult.

## 2024-10-14 NOTE — CONSULTS
Care Management Initial Consult    General Information  Assessment completed with: Patient, Spouse or significant other, pt and spouse  Type of CM/SW Visit: Initial Assessment    Primary Care Provider verified and updated as needed: Yes   Readmission within the last 30 days: no previous admission in last 30 days      Reason for Consult: discharge planning, health care directive  Advance Care Planning: Advance Care Planning Reviewed: no concerns identified, verified with patient, other (see comments) (given HCD forms)     General Information Comments: lives w/son, wife is his PCA, family can transport. pt has WC/walker/cane and nebulizer    Communication Assessment  Patient's communication style: spoken language (non-English) (Hmong)             Cognitive  Cognitive/Neuro/Behavioral: WDL                      Living Environment:   People in home: child(gregory), adult     Current living Arrangements: house      Able to return to prior arrangements: yes       Family/Social Support:  Care provided by: child(gregory), spouse/significant other  Provides care for: no one  Marital Status:   Support system: Children, Wife          Description of Support System: Involved, Supportive    Support Assessment: Adequate family and caregiver support, Adequate social supports    Current Resources:   Patient receiving home care services: No        Community Resources: PCA  Equipment currently used at home: cane, straight, walker, rolling, wheelchair, manual  Supplies currently used at home: Nebulizer tubing, Oxygen Tubing/Supplies (on home O2 at 3-4 L PRN)    Employment/Financial:  Employment Status:          Financial Concerns: none   Referral to Financial Worker: No       Does the patient's insurance plan have a 3 day qualifying hospital stay waiver?  No    Lifestyle & Psychosocial Needs:  Social Determinants of Health     Food Insecurity: Not on file   Depression: Not on file   Housing Stability: Not on file   Tobacco Use: Unknown  (4/25/2024)    Patient History     Smoking Tobacco Use: Never     Smokeless Tobacco Use: Unknown     Passive Exposure: Not on file   Financial Resource Strain: Not on file   Alcohol Use: Not on file   Transportation Needs: Not on file   Physical Activity: Not on file   Interpersonal Safety: Not on file   Stress: Not on file   Social Connections: Unknown (4/19/2023)    [facility]    Social Connections     Frequency of Communication with Friends and Family: Not on file   Health Literacy: Not on file       Functional Status:  Prior to admission patient needed assistance:   Dependent ADLs:: Ambulation-cane, Ambulation-walker, Bathing, Dressing, Grooming  Dependent IADLs:: Cleaning, Cooking, Laundry, Shopping, Meal Preparation, Medication Management, Money Management  Assesssment of Functional Status: Not at baseline with ADL Functioning, Not at baseline with mobility, Not at  functional baseline    Mental Health Status:  Mental Health Status: No Current Concerns       Chemical Dependency Status:                Values/Beliefs:  Spiritual, Cultural Beliefs, Zoroastrianism Practices, Values that affect care:                 Discussed  Partnership in Safe Discharge Planning  document with patient/family: No    Additional Information:  Pt lives w/son Kaleigh Singh, wife lives w/the other son, wife is his PCA for 8#/day and has WC/Walker/Cane.   Family will transport at discharge. No other concerns at this time.     Next Steps: cardiac workup and treat pain, final treatment plan    Homa Costa RN

## 2024-10-14 NOTE — ED NOTES
Trialing pt off of bipap. Pt on 3L NC and satting 97%. Pt having conversation in full sentences with family at bedside without respiratory distress.

## 2024-10-14 NOTE — TREATMENT PLAN
RCAT Treatment Plan    Patient Score: 9  Patient Acuity: 5    Clinical Indication for Therapy: wheezing    Therapy Ordered: continue current therapy    Assessment Summary: pt here with shortness of breath and chest pain. He has a history of heart failure with preserved ejection fraction. No tobacco smoking history. CT 10/14 minor dependent atelectasis in posterior lungs. RR 24, LS inspiratory expiratory wheezing, NPC, currently on BiPAP at 30% FiO2 sats high 90s. Will reassess in 3 days or as needed.      Maximilian Lima, RT  10/14/2024

## 2024-10-14 NOTE — PLAN OF CARE
Problem: Chest Pain  Goal: Resolution of Chest Pain Symptoms  10/14/2024 1852 by Sheila Ennis RN  Outcome: Progressing  10/14/2024 1852 by Sheila Ennis RN  Outcome: Progressing     Problem: Gas Exchange Impaired  Goal: Optimal Gas Exchange  Outcome: Progressing   Goal Outcome Evaluation:      A&O x4. C/o 5/10 chest pain (pressure, tightness). PRN Oxy given x1- this was effective per pt. BG this shift was 311 at supper, and 287 at HS. He can make his needs known. Call-light within reach.     Per Dr. Whittaker: For the pt's chest pain, first try the Oxy, then go to the Morphine if no relief of chest pain.

## 2024-10-14 NOTE — PROVIDER NOTIFICATION
Patient arrived with Increased WOB. BS audible expiratory wheezes, crackles in bases. Nebs given x3. Patient tolerating Bipap well. RT will continue to follow.      10/14/24 0024   Mode: CPAP/ BiPAP/ AVAPS/ AVAPS AE   CPAP/BiPAP/ AVAPS/ AVAPS AE Mode BiPAP S/T   Equipment   Device V60   Device Serial Number [id number]   CPAP/BiPAP/Settings   $CPAP/BiPAP Initial completed   BIPAP/CPAP On Standby On   IPAP/EPAP (cmH2O) 10/5   Rate (breaths/min) 14   Oxygen (%) 40   Timed Inspiration (sec) 0.9   IPAP RISE  Settings (V60) 3   CPAP/BiPAP Patient Parameters   IPAP (cm H2O) 10 cmH2O   EPAP (cm H2O) 5 cmH2O   RR Total (breaths/min) 21 breaths/min   Vt (mL) 511 mL   Minute Ventilation (L/min) 10.7 L/min   Peak Inspiratory Pressure (cm H2O) 11 cmH2O   Pt.  Leak (L/min) 3 L/min

## 2024-10-14 NOTE — ED NOTES
Pt information reviewed with P3 monitor tech for cardiac monitoring. Questions asked and answered.

## 2024-10-14 NOTE — ED PROVIDER NOTES
EMERGENCY DEPARTMENT ENCOUNTER      NAME: Sedrick Murray  AGE: 74 year old male  YOB: 1950  MRN: [id number]  EVALUATION DATE & TIME: 10/13/2024 11:47 PM    ED PROVIDER: Janelle Douglas MD    Chief Complaint   Patient presents with    Chest Pain    Shortness of Breath       FINAL IMPRESSION  1. Acute respiratory distress    2. Chest pain, unspecified type        MEDICAL DECISION MAKING   Sedrick Murray is a 74 year old male who presents for evaluation of dyspnea and chest pain.  Outside records reviewed.  Patient has a history of coronary artery disease with, hypertension, hyperlipidemia, heart failure with preserved ejection fraction, aortic valve insufficiency, patient's type 2 diabetes, prior CVA.  Most recent echocardiogram 4/27/2024 with EF of 55 to 60%.  He had a coronary angiogram on 4/12/2024 that I did review, revealing no clear intervenable culprit lesion.  Patient was admitted most recently 4/25-4/29/2024 after presenting to the ED with chest pain and dyspnea.  In the ED, he had a CT PE study that showed no evidence of PE or other obvious process.  He continued to complain of some chest discomfort which improved with nitro and as such, he was started on a nitro drip.  While in the hospital, patient was evaluated by cardiology.  Patient is felt to be high risk for dual antiplatelet therapy given history of hemorrhagic stroke.  Cardiology has recommended medical management.  He has a history of chronic diastolic heart failure and is on Bumex and spironolactone.  Today, patient presents with son for evaluation of symptoms that began this afternoon.  He describes anterior chest pain, dyspnea, mild cough and wheezing.  He has been taking all of his medications as prescribed.  No recent travel or sick contacts.  I met with the patient shortly after he was roomed and obtained history from both he and his son.  Initial vitals were notable for fairly significant tachypnea with respiratory rate of 34 and  elevated blood pressure 177/89.    I considered a broad differential COVID not limited to ACS/ischemia, unstable angina, acute on chronic CHF, COPD, viral illness, pneumonia, PE, pulmonary edema, pleural effusion, pericarditis, myocarditis, medication noncompliance/tolerance, other.  Discussed options for COVID management with patient and his son.  We have agreed on plan for labs, portable chest x-ray, EKG, and trial of BiPAP with nebulizer.   I spoke with RT who graciously came to the ED to meet with the patient and placed him on BiPAP.  He had significant and rapid improvement in work of breathing, respiratory rate, and overall comfort.  Blood pressure improved to 131/60 and respiratory rate came down to the 20s.      Initial EKG showed no acute ST changes, but did reveal moderate criteria for LVH.  I independently reviewed portable chest x-ray which showed no focal infiltrates or significant pulmonary edema.     ED Course as of 10/14/24 0232   Mon Oct 14, 2024   0058 D-Dimer Quantitative(!): 0.84  D-dimer elevated above age adjusted cut off. I have lower suspicion for PE than alternative etiology but will proceed with CT scan to rule this etiology out and to obtain better view of lungs.    0110 N-Terminal Pro BNP Inpatient: 504  BNP slightly elevated but CXR showed no evidence of florid pulmonary edema. I suspect component of chronicity.    0111 Troponin T, High Sensitivity(!): 42  Troponin elevated above age adjusted cut off but again, suspect chronic. Will plan to repeat at 2 hours. ECG showed no acute ST changes.    0111 Basic metabolic panel(!)  BMP reassuring. No evidence of PANKAJ, acidosis, or significant electrolyte derangement.    0142 Blood gas venous(!)  VBG reassuring. No evidence of hypercarbia, acidosis.    0142 Procalcitonin: 0.09  Negative, unlikely PNA as etiology of dyspnea.    0210 CT Chest Pulmonary Embolism w Contrast  CT of chest showed no evidence of PE, edema, effusion.    0218 Symptomatic  Influenza A/B, RSV, & SARS-CoV2 PCR (COVID-19) Nasopharyngeal  Viral swabs all negative.   0231 Troponin T, High Sensitivity(!): 36  Repeat troponin downtrending, unlikely ACS as etiology of symptoms.     Workup today was notable for the above.  At this point, etiology of fairly significant dyspnea is unclear but certainly could be related to viral illness, COPD, or CHF.  Patient had rapid improvement in work of breathing and tachypnea with initiation of BiPAP and when this was even briefly removed to administer aspirin, he had increasing tachypnea and difficulty breathing.  With this, we will plan for admission for further workup and management.    I discussed the case with Dr. Orozco who agreed with management thus far and will facilitate admission. Will keep patient on BiPAP for now.       Critical care: 60 minutes excluding separately billable procedures.  Includes bedside management, time reviewing test results, review of records, discussing the case with staff, documenting the medical record and time spent with family members (or surrogate decision makers) discussing specific treatment issues.         Considerations in Medical Decision Making  History:  Obtained supplemental history: Documented in chart and Family Member/Significant Other  Reviewed external records: Documented in chart and Inpatient Record: [facility] admissions on 04/11/24 until 04/15/24 on 04/25/24 until 04/29/24  Care impacted by chronic illness: Documented in Chart, Cerebrovascular Disease, Chronic Pain, Diabetes, Heart Disease, Hyperlipidemia, Hypertension, and Mental Health    Work Up:  In additional to work up documented, I considered the following work up: Documented in chart, if applicable.  Chart documentation includes differential considered and any EKGs or imaging independently interpreted by provider, where specified.    External consultation:  Discussion of management with another provider: Documented in  chart, if applicable    Disposition considerations: Admit.    MIPS Documentation: CT Pulmonary Angiogram:The patient had an abnormal d-dimer.       ED COURSE  11:55 PM I met with the patient to gather history and to perform my initial exam. We discussed plans for the ED course, including diagnostic testing and treatment.    12:21 AM Rechecked and updated patient. Patient is now in BiPap.   1:16 AM Rechecked and updated patient.   2:11 AM I rechecked the patient.   2:20 AM Spoke with the Hospitalist, Dr. Orozco, who accepts the patient for admission.     MEDICATIONS GIVEN IN THE ED  Medications   ipratropium - albuterol 0.5 mg/2.5 mg/3 mL (DUONEB) neb solution 3 mL (3 mLs Nebulization $Given 10/14/24 0029)   aspirin (ASA) chewable tablet 324 mg (324 mg Oral $Given 10/14/24 0105)   iopamidol (ISOVUE-370) solution 90 mL (90 mLs Intravenous $Given 10/14/24 0146)       NEW PRESCRIPTIONS STARTED AT TODAY'S VISIT  New Prescriptions    No medications on file          =================================================================    HPI:    Use of : Yes (Phone) - Language Sahra Murray is a 74 year old male with a pertinent medical history of NSTEMI, chronic diastolic irizarry failure,, CAD, ASCVD, mitral valve disorder, aortic valve insufficiency, aortic valve regurgitation, HLD, chronic pain, T2DM, HTN, thrombocytopenic disorder, CVA, hemorrhagic stroke (basal ganglia hemorrhage), chronic gout of multiple sites, anxiety, who presents shortness of breath, cough, and chest pain.    Patient having shortness of breath since approximately 3:00 PM today. He also endorses developing cough, subjective fevers, and chest pain today. He states his chest pain is characterized as entire anterior chest pain. He endorses developing similar symptoms in April 2024 and notes he was admitted during that time. He endorses taking all of his prescription medications as prescribed, but he notes he does not currently have any  breathing treatments at home.     Patient denies any known recent sick/ill contacts. He also denies any recent abdominal pain, vomiting, leg swelling, or any other complications at this time.     Per Chart Review, patient was admitted to [facility] on 04/11/24 until 04/15/24. Patient was admitted on 4/11/2024 with NSTEMI. There was concern for acute on chronic diastolic CHF exacerbation and patient was noted to have a history of CAD, aortic regurgitation, mitral valve disease, HFpEF. Patient initially presented with chest and lower abdominal pain and found to have elevated troponin with T wave inversions on EKG. BNP was elevated. CT chest negative for PE or dissection. There was incidental finding of ulceration of the proximal SMA plaque at was seen on prior study done in 2020. There was a incidental finding of advanced atherosclerotic disease with 3.9 x 3.1 cm infrarenal abdominal aortic aneurysm which had slightly increased in size. Review of prior coronary angiogram was notable for LAD and distal circ disease  TTE showed preserved EF with no concerning WMA, however, mitral leaflets appear thickened, hooded, and/or redundant, consistent  with myxomatous degeneration, mild to moderate mitral regurgitation, and mod-severe to severe (3-4+) aortic regurgitation. Coronary angiogram on 04/12/24 showed stable CAD and intervention was not performed. Elevated LVEDP was indicative of CHF. Patient was started on lidocaine patch 4/13/24 for chest pain, and after complaining of ongoing chest pain overnight 4/13/24-4/14/24 that improved with NTG and GI cocktail patient was then started on imdur 4/14/24. Patient was continued bumex 1 mg daily and spironolactone 25mg dialy after discharge per cardiology. Patient had home HCTZ.stopped. Patient was continue home ASA, statin, and amlodipine. Home losartan was held given recent hypotension and delayed renal recovery. Patient was discharged instructed to  "follow-up with his primary cardiologist at [facility] to determine if further evaluation for his aortic regurgitation is needed      Per Chart Review, patient was admitted to [facility] on 04/25/24 until 04/29/24 for CHF and chest pain. It was noted that a recent angiogram showed unchanged findings of 80% stenosis of distal LAD lesion; massively aneurysmal RCA. Felt to be unlikely to be cause of chest pain and patient was a high risk candidate for DAPT due to history of hemorrhagic stroke, therefore stent risk/benefit profile felt unfavorable. Medical mgmt was recommended. There was mention that angioplaty of LAD lesion could be considered if patient's symptoms did not resolve. TTE was unrevealing for post-cath pericarditis or other obvious explanation for chest pain. VQ scan had very low probability. Patient was continue dcurrent cardiac regimen with added metoprolol and Imdur. Patient was weaned off 1L NC during admission. No need for acute intervention per cardiology. Patient was discharged with instructions to follow-up as outpatient with his regular cardiologist.          RELEVANT HISTORY, MEDICATIONS, & ALLERGIES   Past medical history, surgical history, family history, medications, and allergies reviewed and pertinent noted in HPI.    REVIEW OF SYSTEMS:  A complete review of systems was performed with pertinent positives and negatives noted in the HPI. All other systems negative.     PHYSICAL EXAM:    Vitals: BP (!) 145/67   Pulse 89   Temp 98.3  F (36.8  C)   Resp 20   Ht 1.6 m (5' 3\")   Wt 81.2 kg (179 lb)   SpO2 99%   BMI 31.71 kg/m     General: Alert and interactive. Moderate respiratory distress.   HENT: Atraumatic. Full AROM of neck. MMM.  Cardiovascular: Regular rate and rhythm.   Chest/Pulmonary: Significantly increased work of breathing. Tachypnea with RR 35. Audible expiratory wheezing and tight breath sounds.   Abdomen: Soft, nondistended. Nontender without " guarding or rebound.  Extremities: Normal AROM of all major joints. No LE edema.   Skin: Warm and dry. Normal skin color.   Neuro: Speech clear. CNs grossly intact. Moves all extremities spontaneously.   Psych: Normal affect/mood, cooperative, memory appropriate.      LAB  Labs Ordered and Resulted from Time of ED Arrival to Time of ED Departure   BASIC METABOLIC PANEL - Abnormal       Result Value    Sodium 136      Potassium 3.8      Chloride 97 (*)     Carbon Dioxide (CO2) 24      Anion Gap 15      Urea Nitrogen 25.1 (*)     Creatinine 1.14      GFR Estimate 68      Calcium 9.1      Glucose 143 (*)    D DIMER QUANTITATIVE - Abnormal    D-Dimer Quantitative 0.84 (*)    TROPONIN T, HIGH SENSITIVITY - Abnormal    Troponin T, High Sensitivity 42 (*)    TROPONIN T, HIGH SENSITIVITY - Abnormal    Troponin T, High Sensitivity 36 (*)    BLOOD GAS VENOUS - Abnormal    pH Venous 7.36      pCO2 Venous 49      pO2 Venous 48 (*)     Bicarbonate Venous 27      Base Excess/Deficit Venous 1.1      FIO2 40      Oxyhemoglobin Venous 80 (*)     O2 Sat, Venous 80.4 (*)    MAGNESIUM - Normal    Magnesium 1.7     NT PROBNP INPATIENT - Normal    N terminal Pro BNP Inpatient 504     PROCALCITONIN - Normal    Procalcitonin 0.09     INFLUENZA A/B, RSV, & SARS-COV2 PCR - Normal    Influenza A PCR Negative      Influenza B PCR Negative      RSV PCR Negative      SARS CoV2 PCR Negative     CBC WITH PLATELETS - Normal    WBC Count 7.1      RBC Count 5.20      Hemoglobin 15.6      Hematocrit 48.3      MCV 93      MCH 30.0      MCHC 32.3      RDW 13.6      Platelet Count 150         RADIOLOGY  CT Chest Pulmonary Embolism w Contrast   Final Result   IMPRESSION:   1.  No pulmonary emboli on either side. Lungs are clear. No adenopathy or effusion.      2.  Cardiac size approaches upper limits of normal. Mild coronary artery calcifications. Ectatic ascending thoracic segment. No dissection.      3.  Partially visualized atherosclerotic plaque  within the SMA, better demonstrated on prior study.      4.  Cholecystectomy clips.      XR Chest Port 1 View   Final Result   IMPRESSION: Normal heart size and pulmonary vascularity. No acute infiltrates or consolidation. Aortic calcification. Hypertrophic changes thoracic spine. Otherwise, unremarkable. No acute findings.          EKG  Performed at: 2355  Impression: Sinus rhythm with occasional PVC. No acute ischemic changes. Moderate voltage criteria for LVH. Compared to previous, T waves more prominent in V3 and less prominent in V4-V5. QT slightly increased.   Rate: 91  Rhythm: Sinus  QRS Interval: 82  QTc Interval: 484  Comparison: 4/27/24    All laboratory and imaging results and EKG's were personally reviewed and interpreted by myself prior to disposition decision.       I, Rudolph Stanton, am serving as a scribe to document services personally performed by Dr. Janelle Douglas based on my observation and the provider's statements to me. I, Janelle Douglas MD attest that Rudolph Stanton is acting in a scribe capacity, has observed my performance of the services and has documented them in accordance with my direction.    Janelle Douglas M.D.  Emergency Medicine  [facility] EMERGENCY DEPARTMENT  [facility] [facility] [address]  [phone]  Dept: [phone]       Janelle Douglas MD  10/14/24 0232

## 2024-10-14 NOTE — MEDICATION SCRIBE - ADMISSION MEDICATION HISTORY
Medication Scribe Admission Medication History    Admission medication history is complete. The information provided in this note is only as accurate as the sources available at the time of the update.    Information Source(s): Patient and Family member via in-person    Pertinent Information: Patient states that he is taking two medication for his blood pressure and does not know the name of the medication. Per Rx fill history Amlodipine and Losartan have been filled most recently 9/23/2024.   Patient states that he is taking Allopurinol despite of no recent fill (last filled on 4/1/2024 90 days supply)    Changes made to PTA medication list:  Added: Losartan, Vitamin D3(per patient and fill history)  Deleted: Tylenol, Maalox, Aspirin, Diclofenac, Isosorbide Mononitrate, Metoprolol, Trazodone, Sucralfate (per patient)  Changed: None    Allergies reviewed with patient and updates made in EHR: yes    Medication History Completed By: Dave Yang 10/14/2024 1:07 AM    PTA Med List   Medication Sig Last Dose    allopurinol (ZYLOPRIM) 300 MG tablet Take 300 mg by mouth 2 times daily 10/13/2024 at pm    amLODIPine (NORVASC) 10 MG tablet Take 10 mg by mouth daily 10/13/2024 at am    bumetanide (BUMEX) 1 MG tablet Take 1 tablet (1 mg) by mouth daily 10/13/2024 at am    losartan (COZAAR) 100 MG tablet Take 100 mg by mouth daily. 10/13/2024 at am    nitroGLYcerin (NITROSTAT) 0.4 MG sublingual tablet For chest pain place 1 tablet under the tongue every 5 minutes for 3 doses. If symptoms persist 5 minutes after 1st dose call 911. Unknown at prn    pantoprazole (PROTONIX) 40 MG EC tablet Take 1 tablet (40 mg) by mouth 2 times daily (before meals) 10/13/2024 at pm    rosuvastatin (CRESTOR) 40 MG tablet Take 40 mg by mouth daily 10/13/2024 at am    spironolactone (ALDACTONE) 25 MG tablet Take 1 tablet (25 mg) by mouth daily 10/13/2024 at am    VITAMIN D3 25 MCG (1000 UT) tablet Take 25 mcg by mouth daily. 10/13/2024 at am

## 2024-10-15 LAB
GLUCOSE BLDC GLUCOMTR-MCNC: 185 MG/DL (ref 70–99)
GLUCOSE BLDC GLUCOMTR-MCNC: 198 MG/DL (ref 70–99)
GLUCOSE BLDC GLUCOMTR-MCNC: 226 MG/DL (ref 70–99)
GLUCOSE BLDC GLUCOMTR-MCNC: 247 MG/DL (ref 70–99)

## 2024-10-15 PROCEDURE — 94640 AIRWAY INHALATION TREATMENT: CPT

## 2024-10-15 PROCEDURE — 250N000013 HC RX MED GY IP 250 OP 250 PS 637: Performed by: FAMILY MEDICINE

## 2024-10-15 PROCEDURE — 250N000013 HC RX MED GY IP 250 OP 250 PS 637: Performed by: INTERNAL MEDICINE

## 2024-10-15 PROCEDURE — 99232 SBSQ HOSP IP/OBS MODERATE 35: CPT | Performed by: INTERNAL MEDICINE

## 2024-10-15 PROCEDURE — 999N000157 HC STATISTIC RCP TIME EA 10 MIN

## 2024-10-15 PROCEDURE — 250N000011 HC RX IP 250 OP 636: Performed by: FAMILY MEDICINE

## 2024-10-15 PROCEDURE — 250N000011 HC RX IP 250 OP 636: Performed by: INTERNAL MEDICINE

## 2024-10-15 PROCEDURE — 120N000004 HC R&B MS OVERFLOW

## 2024-10-15 PROCEDURE — 250N000009 HC RX 250: Performed by: EMERGENCY MEDICINE

## 2024-10-15 PROCEDURE — 94640 AIRWAY INHALATION TREATMENT: CPT | Mod: 76

## 2024-10-15 RX ORDER — NITROGLYCERIN 0.4 MG/1
0.4 TABLET SUBLINGUAL EVERY 5 MIN PRN
Status: DISCONTINUED | OUTPATIENT
Start: 2024-10-15 | End: 2024-10-21 | Stop reason: HOSPADM

## 2024-10-15 RX ORDER — METHYLPREDNISOLONE SODIUM SUCCINATE 40 MG/ML
40 INJECTION INTRAMUSCULAR; INTRAVENOUS EVERY 24 HOURS
Status: DISCONTINUED | OUTPATIENT
Start: 2024-10-16 | End: 2024-10-16

## 2024-10-15 RX ORDER — DOXYCYCLINE 100 MG/1
100 CAPSULE ORAL EVERY 12 HOURS SCHEDULED
Status: DISCONTINUED | OUTPATIENT
Start: 2024-10-15 | End: 2024-10-18

## 2024-10-15 RX ORDER — LOSARTAN POTASSIUM 50 MG/1
100 TABLET ORAL DAILY
Status: DISCONTINUED | OUTPATIENT
Start: 2024-10-15 | End: 2024-10-21 | Stop reason: HOSPADM

## 2024-10-15 RX ORDER — ASPIRIN 81 MG/1
81 TABLET ORAL DAILY
Status: DISCONTINUED | OUTPATIENT
Start: 2024-10-15 | End: 2024-10-21 | Stop reason: HOSPADM

## 2024-10-15 RX ADMIN — INSULIN ASPART 3 UNITS: 100 INJECTION, SOLUTION INTRAVENOUS; SUBCUTANEOUS at 12:08

## 2024-10-15 RX ADMIN — ISOSORBIDE MONONITRATE 30 MG: 30 TABLET, EXTENDED RELEASE ORAL at 08:02

## 2024-10-15 RX ADMIN — INSULIN ASPART 1 UNITS: 100 INJECTION, SOLUTION INTRAVENOUS; SUBCUTANEOUS at 08:03

## 2024-10-15 RX ADMIN — ALLOPURINOL 300 MG: 300 TABLET ORAL at 21:57

## 2024-10-15 RX ADMIN — ZOLPIDEM TARTRATE 2.5 MG: 5 TABLET, FILM COATED ORAL at 00:00

## 2024-10-15 RX ADMIN — IPRATROPIUM BROMIDE AND ALBUTEROL SULFATE 3 ML: .5; 3 SOLUTION RESPIRATORY (INHALATION) at 15:38

## 2024-10-15 RX ADMIN — IPRATROPIUM BROMIDE AND ALBUTEROL SULFATE 3 ML: .5; 3 SOLUTION RESPIRATORY (INHALATION) at 07:18

## 2024-10-15 RX ADMIN — AMLODIPINE BESYLATE 10 MG: 5 TABLET ORAL at 08:02

## 2024-10-15 RX ADMIN — ZOLPIDEM TARTRATE 2.5 MG: 5 TABLET, FILM COATED ORAL at 22:06

## 2024-10-15 RX ADMIN — MORPHINE SULFATE 2 MG: 2 INJECTION, SOLUTION INTRAMUSCULAR; INTRAVENOUS at 00:20

## 2024-10-15 RX ADMIN — IPRATROPIUM BROMIDE AND ALBUTEROL SULFATE 3 ML: .5; 3 SOLUTION RESPIRATORY (INHALATION) at 20:09

## 2024-10-15 RX ADMIN — PANTOPRAZOLE SODIUM 40 MG: 40 TABLET, DELAYED RELEASE ORAL at 16:33

## 2024-10-15 RX ADMIN — ROSUVASTATIN CALCIUM 40 MG: 40 TABLET, FILM COATED ORAL at 08:02

## 2024-10-15 RX ADMIN — DOXYCYCLINE 100 MG: 100 CAPSULE ORAL at 21:56

## 2024-10-15 RX ADMIN — INSULIN ASPART 2 UNITS: 100 INJECTION, SOLUTION INTRAVENOUS; SUBCUTANEOUS at 16:34

## 2024-10-15 RX ADMIN — ALLOPURINOL 300 MG: 300 TABLET ORAL at 08:02

## 2024-10-15 RX ADMIN — LOSARTAN POTASSIUM 100 MG: 50 TABLET, FILM COATED ORAL at 10:54

## 2024-10-15 RX ADMIN — IPRATROPIUM BROMIDE AND ALBUTEROL SULFATE 3 ML: .5; 3 SOLUTION RESPIRATORY (INHALATION) at 11:19

## 2024-10-15 RX ADMIN — ASPIRIN 81 MG: 81 TABLET, COATED ORAL at 10:54

## 2024-10-15 RX ADMIN — METHYLPREDNISOLONE SODIUM SUCCINATE 40 MG: 40 INJECTION, POWDER, FOR SOLUTION INTRAMUSCULAR; INTRAVENOUS at 04:12

## 2024-10-15 RX ADMIN — Medication 25 MCG: at 08:02

## 2024-10-15 RX ADMIN — BUMETANIDE 1 MG: 1 TABLET ORAL at 08:02

## 2024-10-15 RX ADMIN — PANTOPRAZOLE SODIUM 40 MG: 40 TABLET, DELAYED RELEASE ORAL at 07:03

## 2024-10-15 RX ADMIN — SPIRONOLACTONE 25 MG: 25 TABLET, FILM COATED ORAL at 08:02

## 2024-10-15 ASSESSMENT — ACTIVITIES OF DAILY LIVING (ADL)
ADLS_ACUITY_SCORE: 34
ADLS_ACUITY_SCORE: 34
ADLS_ACUITY_SCORE: 33
ADLS_ACUITY_SCORE: 33
ADLS_ACUITY_SCORE: 34
ADLS_ACUITY_SCORE: 33
ADLS_ACUITY_SCORE: 34
ADLS_ACUITY_SCORE: 33
ADLS_ACUITY_SCORE: 34
ADLS_ACUITY_SCORE: 34
ADLS_ACUITY_SCORE: 33
ADLS_ACUITY_SCORE: 34
ADLS_ACUITY_SCORE: 33
ADLS_ACUITY_SCORE: 34
ADLS_ACUITY_SCORE: 34
ADLS_ACUITY_SCORE: 33
ADLS_ACUITY_SCORE: 34

## 2024-10-15 NOTE — PLAN OF CARE
Problem: Adult Inpatient Plan of Care  Goal: Plan of Care Review  Outcome: Progressing    Problem: Chest Pain  Goal: Resolution of Chest Pain Symptoms  Outcome: Progressing     Problem: Gas Exchange Impaired  Goal: Optimal Gas Exchange  Outcome: Progressing    Problem: Hypertension Acute  Goal: Blood Pressure Within Desired Range  Outcome: Progressing     Problem: Glycemic Control Impaired  Goal: Blood Glucose Level Within Targeted Range  Outcome: Progressing      Pt alert and oriented. SBA to bathroom. Denied pain today. Pt does not require any oxygen. O2 sats 95-98%. Pt does have .5L O2 on for comfort only. Dr. Whittaker updated. BP: 150/69, 135/67 and 128/60. , 247 and 226. Pt covered per insulin orders. Tele discontinued.

## 2024-10-15 NOTE — PROGRESS NOTES
Care Management Follow Up    Length of Stay (days): 1    Expected Discharge Date: 10/16/2024     Concerns to be Addressed:     Care progression - discharge planning  Patient plan of care discussed at interdisciplinary rounds: Yes    Anticipated Discharge Disposition:  TBD    Anticipated Discharge Services:  TBD  Anticipated Discharge DME:  NA    Patient/family educated on Medicare website which has current facility and service quality ratings:  NA  Education Provided on the Discharge Plan:  Yes per team  Patient/Family in Agreement with the Plan:  NA    Referrals Placed by CM/SW:  NA  Private pay costs discussed: Not applicable    Discussed  Partnership in Safe Discharge Planning  document with patient/family: No     Handoff Completed: No, handoff not indicated or clinically appropriate    Additional Information:  Per provider, Dr. Whittaker, patient will stay. Trying to wean off oxygen.    Social Hx:  Assessment: Follow. Live w/son, Kaleigh Singh, and his family in a house. DIL is PCA 8 hrs/day. Has DME. Home oxygen 3-4L prn. [facility] Bird [phone].  Last Note: 10/15/24  Plan: TBD  Needs: Wean off oxygen  Hand off sent: NA  Transport: Family    Next Steps: RNCM to follow for medical progression, recommendations, and final discharge plan.     Mary Penn RN     1140 rec'd a call from AIDA Bird [phone] requesting an update and discharge plan.  Update given

## 2024-10-15 NOTE — PLAN OF CARE
Problem: Chest Pain  Goal: Resolution of Chest Pain Symptoms  Outcome: Progressing     Problem: Gas Exchange Impaired  Goal: Optimal Gas Exchange  Outcome: Progressing   Goal Outcome Evaluation:    A/Ox4. C/o of chest tightness and pain 3/10. Prn oxycodone given, not relief. Morphine given x1, pt reported chest pain improved to 1/10. Ambien given for sleeping last night. Otherwise pt is doing good. Make needs known.

## 2024-10-15 NOTE — PROGRESS NOTES
[facility]    Medicine Progress Note - Hospitalist Service    Date of Admission:  10/13/2024    Assessment & Plan   74-year-old with history of bronchitis, CAD not on aspirin who presented with shortness of breath and chest pain.  Serial troponins are trending down.  proBNP is negative.  Patient was treated with steroids nebs and antibiotics and is improving.    Acute respiratory insufficiency likely due to bronchitis now off BiPAP  -- Wean of oxygen  -- BNP is negative.  Echocardiogram is unremarkable  -- Opponent's are trending down  -- Chest x-ray is unremarkable for acute infiltrate  -- De-escalation the steroids to once daily  -- Continue nebs  Doxycycline twice daily    Chest pain likely musculoskeletal since it is reproducible  -- Restart aspirin 81 mg as is for ordered in the past  -- Continue Imdur  --Patient has had recent angiogram with unchanged findings of 80% stenosis of the distal LAD and massive distal RCA  --On his last discharge, metoprolol was added to his regimen but is unclear why it was discontinued.  --CT chest negative for PE  -- Use morphine and oxycodone for pain control  -- Troponin x 2 has been trending down  -- Ordered metoprolol 25 mg once daily if symptoms does not resolve  -- He is a poor candidate for dual antiplatelet therapy due to previous CNS bleed.  -- May require stand-alone PTCA of the apical LAD segment if symptoms persist or recur    Hyperglycemia likely due to steroid  Lab Results   Component Value Date    A1C 7.2 10/14/2024    A1C 7.4 04/25/2024    A1C 5.8 03/17/2017    A1C 5.9 09/01/2014    A1C 5.8 03/11/2013   -- Currently uncontrolled due to steroids.  De-escalate steroids to once daily         History of hemorrhagic stroke but tolerates baby aspirin at home  --Stop baby aspirin is unclear why    Suspected VICTORINA  --Has had nocturnal hypoxia during his hospital stay on previous admission  --Need outpatient follow-up     Diet: Combination Diet  "Moderate Consistent Carb (60 g CHO per Meal) Diet; Low Saturated Fat Na <2400mg Diet, No Caffeine Diet    DVT Prophylaxis: Low Risk/Ambulatory with no VTE prophylaxis indicated  Eugene Catheter: Not present  Lines: None     Cardiac Monitoring: ACTIVE order. Indication: Chest pain/ ACS rule out (24 hours)  Code Status: Full Code      Clinically Significant Risk Factors          # Hypochloremia: Lowest Cl = 97 mmol/L in last 2 days, will monitor as appropriate          # Hypertension: Noted on problem list          # DMII: A1C = 7.2 % (Ref range: <5.7 %) within past 6 months, PRESENT ON ADMISSION  # Obesity: Estimated body mass index is 31.57 kg/m  as calculated from the following:    Height as of this encounter: 1.6 m (5' 3\").    Weight as of this encounter: 80.8 kg (178 lb 3.2 oz)., PRESENT ON ADMISSION     # Financial/Environmental Concerns: none         Disposition Plan     Medically Ready for Discharge: Anticipated Tomorrow             Valdemar Whittaker MD  Hospitalist Service  [facility]  Securely message with [employer] (more info)  Text page via [employer] Paging/Directory   ______________________________________________________________________    Interval History   Shortness of breath is better.  Patient is on half liter of supplemental oxygen.  Has reproducible chest pain over the precordial area.  Blood sugars are elevated.  Troponins are trending down.  Restarted aspirin as patient had stopped aspirin.  Sugars are climbing and therefore de-escalate steroids    Physical Exam   Vital Signs: Temp: 97.7  F (36.5  C) Temp src: Oral BP: 135/67 Pulse: 97   Resp: 20 SpO2: 100 % O2 Device: None (Room air) Oxygen Delivery: 1/2 LPM  Weight: 178 lbs 3.2 oz    Minimal crackles and wheezing  Chest pain is reproducible with pressure over the costochondral margin    Medical Decision Making       35 MINUTES SPENT BY ME on the date of service doing chart review, history, exam, documentation & further activities " per the note.  MANAGEMENT DISCUSSED with the following over the past 24 hours: Patient   NOTE(S)/MEDICAL RECORDS REVIEWED over the past 24 hours: Nursing       Data         Imaging results reviewed over the past 24 hrs:   No results found for this or any previous visit (from the past 24 hour(s)).

## 2024-10-16 ENCOUNTER — APPOINTMENT (OUTPATIENT)
Dept: RADIOLOGY | Facility: HOSPITAL | Age: 74
DRG: 189 | End: 2024-10-16
Attending: INTERNAL MEDICINE
Payer: COMMERCIAL

## 2024-10-16 LAB
GLUCOSE BLDC GLUCOMTR-MCNC: 203 MG/DL (ref 70–99)
GLUCOSE BLDC GLUCOMTR-MCNC: 278 MG/DL (ref 70–99)
GLUCOSE BLDC GLUCOMTR-MCNC: 311 MG/DL (ref 70–99)
GLUCOSE BLDC GLUCOMTR-MCNC: 324 MG/DL (ref 70–99)

## 2024-10-16 PROCEDURE — 71045 X-RAY EXAM CHEST 1 VIEW: CPT

## 2024-10-16 PROCEDURE — 250N000012 HC RX MED GY IP 250 OP 636 PS 637: Performed by: INTERNAL MEDICINE

## 2024-10-16 PROCEDURE — 999N000157 HC STATISTIC RCP TIME EA 10 MIN

## 2024-10-16 PROCEDURE — 120N000001 HC R&B MED SURG/OB

## 2024-10-16 PROCEDURE — 250N000013 HC RX MED GY IP 250 OP 250 PS 637: Performed by: INTERNAL MEDICINE

## 2024-10-16 PROCEDURE — 250N000011 HC RX IP 250 OP 636: Performed by: INTERNAL MEDICINE

## 2024-10-16 PROCEDURE — 99232 SBSQ HOSP IP/OBS MODERATE 35: CPT | Performed by: INTERNAL MEDICINE

## 2024-10-16 PROCEDURE — 94640 AIRWAY INHALATION TREATMENT: CPT

## 2024-10-16 PROCEDURE — 94640 AIRWAY INHALATION TREATMENT: CPT | Mod: 76

## 2024-10-16 PROCEDURE — 250N000009 HC RX 250: Performed by: EMERGENCY MEDICINE

## 2024-10-16 PROCEDURE — 250N000013 HC RX MED GY IP 250 OP 250 PS 637: Performed by: FAMILY MEDICINE

## 2024-10-16 RX ORDER — METHYLPREDNISOLONE SODIUM SUCCINATE 40 MG/ML
40 INJECTION INTRAMUSCULAR; INTRAVENOUS EVERY 12 HOURS
Status: DISCONTINUED | OUTPATIENT
Start: 2024-10-16 | End: 2024-10-18

## 2024-10-16 RX ORDER — FUROSEMIDE 10 MG/ML
20 INJECTION INTRAMUSCULAR; INTRAVENOUS ONCE
Status: COMPLETED | OUTPATIENT
Start: 2024-10-16 | End: 2024-10-16

## 2024-10-16 RX ADMIN — PANTOPRAZOLE SODIUM 40 MG: 40 TABLET, DELAYED RELEASE ORAL at 16:30

## 2024-10-16 RX ADMIN — DOXYCYCLINE 100 MG: 100 CAPSULE ORAL at 09:00

## 2024-10-16 RX ADMIN — AMLODIPINE BESYLATE 10 MG: 5 TABLET ORAL at 08:59

## 2024-10-16 RX ADMIN — BUMETANIDE 1 MG: 1 TABLET ORAL at 09:00

## 2024-10-16 RX ADMIN — ALLOPURINOL 300 MG: 300 TABLET ORAL at 09:00

## 2024-10-16 RX ADMIN — PANTOPRAZOLE SODIUM 40 MG: 40 TABLET, DELAYED RELEASE ORAL at 09:00

## 2024-10-16 RX ADMIN — FUROSEMIDE 20 MG: 10 INJECTION, SOLUTION INTRAMUSCULAR; INTRAVENOUS at 17:58

## 2024-10-16 RX ADMIN — LOSARTAN POTASSIUM 100 MG: 50 TABLET, FILM COATED ORAL at 09:00

## 2024-10-16 RX ADMIN — INSULIN ASPART 4 UNITS: 100 INJECTION, SOLUTION INTRAVENOUS; SUBCUTANEOUS at 17:30

## 2024-10-16 RX ADMIN — METHYLPREDNISOLONE SODIUM SUCCINATE 40 MG: 40 INJECTION, POWDER, FOR SOLUTION INTRAMUSCULAR; INTRAVENOUS at 16:30

## 2024-10-16 RX ADMIN — INSULIN ASPART 4 UNITS: 100 INJECTION, SOLUTION INTRAVENOUS; SUBCUTANEOUS at 12:16

## 2024-10-16 RX ADMIN — ZOLPIDEM TARTRATE 2.5 MG: 5 TABLET, FILM COATED ORAL at 21:47

## 2024-10-16 RX ADMIN — ALLOPURINOL 300 MG: 300 TABLET ORAL at 19:56

## 2024-10-16 RX ADMIN — INSULIN ASPART 2 UNITS: 100 INJECTION, SOLUTION INTRAVENOUS; SUBCUTANEOUS at 09:01

## 2024-10-16 RX ADMIN — DOXYCYCLINE 100 MG: 100 CAPSULE ORAL at 19:56

## 2024-10-16 RX ADMIN — SPIRONOLACTONE 25 MG: 25 TABLET, FILM COATED ORAL at 08:59

## 2024-10-16 RX ADMIN — ACETAMINOPHEN 650 MG: 325 TABLET ORAL at 16:30

## 2024-10-16 RX ADMIN — IPRATROPIUM BROMIDE AND ALBUTEROL SULFATE 3 ML: .5; 3 SOLUTION RESPIRATORY (INHALATION) at 19:09

## 2024-10-16 RX ADMIN — IPRATROPIUM BROMIDE AND ALBUTEROL SULFATE 3 ML: .5; 3 SOLUTION RESPIRATORY (INHALATION) at 11:08

## 2024-10-16 RX ADMIN — Medication 25 MCG: at 09:01

## 2024-10-16 RX ADMIN — ASPIRIN 81 MG: 81 TABLET, COATED ORAL at 09:00

## 2024-10-16 RX ADMIN — ISOSORBIDE MONONITRATE 30 MG: 30 TABLET, EXTENDED RELEASE ORAL at 09:00

## 2024-10-16 RX ADMIN — ACETAMINOPHEN 650 MG: 325 TABLET ORAL at 09:10

## 2024-10-16 RX ADMIN — INSULIN HUMAN 10 UNITS: 100 INJECTION, SUSPENSION SUBCUTANEOUS at 17:31

## 2024-10-16 RX ADMIN — ACETAMINOPHEN 650 MG: 325 TABLET ORAL at 23:55

## 2024-10-16 RX ADMIN — METHYLPREDNISOLONE SODIUM SUCCINATE 40 MG: 40 INJECTION, POWDER, FOR SOLUTION INTRAMUSCULAR; INTRAVENOUS at 04:13

## 2024-10-16 RX ADMIN — IPRATROPIUM BROMIDE AND ALBUTEROL SULFATE 3 ML: .5; 3 SOLUTION RESPIRATORY (INHALATION) at 15:34

## 2024-10-16 RX ADMIN — ROSUVASTATIN CALCIUM 40 MG: 40 TABLET, FILM COATED ORAL at 08:59

## 2024-10-16 ASSESSMENT — ACTIVITIES OF DAILY LIVING (ADL)
ADLS_ACUITY_SCORE: 34

## 2024-10-16 NOTE — PROGRESS NOTES
[facility]    Medicine Progress Note - Hospitalist Service    Date of Admission:  10/13/2024    Assessment & Plan   74-year-old with history of bronchitis, CAD not on aspirin who presented with shortness of breath and chest pain.  Serial troponins are trending down.  proBNP is negative.  Patient was treated with steroids nebs and antibiotics and is improving.    Acute respiratory insufficiency likely due to bronchitis now off BiPAP  -- Wean of oxygen not improving quickly.  -- BNP is negative.  Echocardiogram is unremarkable  -- Opponent's are trending down  -- Chest x-ray is unremarkable for acute infiltrate  -- Not tolerate de-escalation of steroids and therefore we will go up on steroids twice daily  -- Continue nebs  Doxycycline twice daily  Ordered repeat chest x-ray    Chest pain likely musculoskeletal since it is reproducible  -- Restart aspirin 81 mg as is for ordered in the past  -- Continue Imdur  --Patient has had recent angiogram with unchanged findings of 80% stenosis of the distal LAD and massive distal RCA  --On his last discharge, metoprolol was added to his regimen but is unclear why it was discontinued.  --CT chest negative for PE  -- Use morphine and oxycodone for pain control  -- Troponin x 2 has been trending down  -- Ordered metoprolol 25 mg once daily if symptoms does not resolve  -- He is a poor candidate for dual antiplatelet therapy due to previous CNS bleed.  -- May require stand-alone PTCA of the apical LAD segment if symptoms persist or recur    Hyperglycemia likely due to steroid  Lab Results   Component Value Date    A1C 7.2 10/14/2024    A1C 7.4 04/25/2024    A1C 5.8 03/17/2017    A1C 5.9 09/01/2014    A1C 5.8 03/11/2013   -- Currently uncontrolled due to steroids.  De-escalate steroids to once daily  -- Order 10 units NPH twice daily       History of hemorrhagic stroke but tolerates baby aspirin at home  -- Patient stopped taking baby aspirin at  "home    Suspected VICTORINA  --Has had nocturnal hypoxia during his hospital stay on previous admission  --Need outpatient follow-up     Diet: Combination Diet Moderate Consistent Carb (60 g CHO per Meal) Diet; Low Saturated Fat Na <2400mg Diet, No Caffeine Diet    DVT Prophylaxis: Low Risk/Ambulatory with no VTE prophylaxis indicated  Eugene Catheter: Not present  Lines: None     Cardiac Monitoring: None  Code Status: Full Code      Clinically Significant Risk Factors                   # Hypertension: Noted on problem list          # DMII: A1C = 7.2 % (Ref range: <5.7 %) within past 6 months, PRESENT ON ADMISSION  # Obesity: Estimated body mass index is 31.19 kg/m  as calculated from the following:    Height as of this encounter: 1.6 m (5' 3\").    Weight as of this encounter: 79.9 kg (176 lb 1.6 oz)., PRESENT ON ADMISSION       # Financial/Environmental Concerns: none         Disposition Plan     Medically Ready for Discharge: Anticipated Tomorrow             Valdemar Whittaker MD  Hospitalist Service  [facility]  Securely message with [employer] (more info)  Text page via [employer] Paging/Directory   ______________________________________________________________________    Interval History   Patient feels more short of breath today.  Nurse advised to put him on oxygen.  Cannot walk to the bathroom.  Has precordial pain rating 1-2      Physical Exam   Vital Signs: Temp: 97.8  F (36.6  C) Temp src: Oral BP: 113/56 Pulse: 100   Resp: 26 SpO2: 93 % O2 Device: None (Room air) Oxygen Delivery: 1.5 LPM  Weight: 176 lbs 1.6 oz    Minimal crackles and wheezing  Chest pain is reproducible with pressure over the costochondral margin    Medical Decision Making       35 MINUTES SPENT BY ME on the date of service doing chart review, history, exam, documentation & further activities per the note.  MANAGEMENT DISCUSSED with the following over the past 24 hours: Patient   NOTE(S)/MEDICAL RECORDS REVIEWED over the past 24 " Recommendations (Free Text): Start Allegra everyday.  Stressed importance of taking everyday \\nTriamcinolone cream twice daily to affected area \\nContinue cerave moisturizer twice a day \\loree or Tide free and clear laundry soap \\nDove for sensative skin bar soap hours: Nursing       Data         Imaging results reviewed over the past 24 hrs:   No results found for this or any previous visit (from the past 24 hour(s)).   Detail Level: Zone

## 2024-10-16 NOTE — PLAN OF CARE
Problem: Adult Inpatient Plan of Care  Goal: Plan of Care Review  Description: The Plan of Care Review/Shift note should be completed every shift.  The Outcome Evaluation is a brief statement about your assessment that the patient is improving, declining, or no change.  This information will be displayed automatically on your shift  note.  Outcome: Progressing     Problem: Adult Inpatient Plan of Care  Goal: Absence of Hospital-Acquired Illness or Injury  Intervention: Identify and Manage Fall Risk  Recent Flowsheet Documentation  Taken 10/16/2024 1630 by Miko Murray, RN  Safety Promotion/Fall Prevention:   room organization consistent   lighting adjusted   increase visualization of patient   clutter free environment maintained   nonskid shoes/slippers when out of bed   patient and family education   room near nurse's station   safety round/check completed   supervised activity   toileting scheduled     Problem: Chest Pain  Goal: Resolution of Chest Pain Symptoms  Outcome: Progressing   Goal Outcome Evaluation:    A & O x 4, VSS, on 1 L NC, medsurg pt. Reported throat pain, tylenol administered. Lung sounds are wheezy all throughout; MD notified and lasix ordered and given once. Pt was then transferred to room 115 at approx. 1850 H. Report given to P1 RN.

## 2024-10-16 NOTE — PLAN OF CARE
Goal Outcome Evaluation:       Awake/oriented and able to communicate needs appropriately. Vital sign within acceptable parameters. Denies pain. Blood sugar covered per ISS. Will continue to monitor and update MD as needed.

## 2024-10-16 NOTE — PROGRESS NOTES
RESPIRATORY CARE NOTE    Pt given duoneb tx as scheduled. BS are coarse/crackles with expiratory wheeze, increased aeration post tx. Pt on room air, SpO2 and HR remained WNL. RT will continue to follow.      Courtney Snyder, RT

## 2024-10-16 NOTE — PLAN OF CARE
Problem: Adult Inpatient Plan of Care  Goal: Optimal Comfort and Wellbeing  Outcome: Met     Problem: Risk for Delirium  Goal: Improved Sleep  Outcome: Met     Problem: Gas Exchange Impaired  Goal: Optimal Gas Exchange  Outcome: Met  Intervention: Optimize Oxygenation and Ventilation  Recent Flowsheet Documentation  Taken 10/16/2024 0030 by Roman Danielson RN  Head of Bed (HOB) Positioning: HOB at 15 degrees   Goal Outcome Evaluation:         Alert and oriented. Med surg. Sleeping in between cares. Requests low flow O2 1.5 L NC but oxygen not in the nares whenever nurse checking on pt. And oxygen levels always in 90's. Stand by assist to bathroom. Denies pain. Vital signs stable. Continue with current plan of care.    Roman Danielson, RN

## 2024-10-16 NOTE — PROGRESS NOTES
Care Management Follow Up    Length of Stay (days): 2    Expected Discharge Date: 10/16/2024     Concerns to be Addressed:     Care progression - discharge planning  Patient plan of care discussed at interdisciplinary rounds: Yes    Anticipated Discharge Disposition:  TBD    Anticipated Discharge Services:  TBD  Anticipated Discharge DME:  NA    Patient/family educated on Medicare website which has current facility and service quality ratings:  NA  Education Provided on the Discharge Plan:  Yes per team  Patient/Family in Agreement with the Plan:  NA    Referrals Placed by CM/SW:  NA  Private pay costs discussed: Not applicable    Discussed  Partnership in Safe Discharge Planning  document with patient/family: No     Handoff Completed: No, handoff not indicated or clinically appropriate    Additional Information:  Per provider, Dr. Whittaker, patient maybe ready today or tomorrow. Trying to wean off oxygen.    Social Hx:  Assessment: Follow. Live w/son, Kaleigh Singh, and his family in a house. DIL is PCA 8 hrs/day. Has DME. Home oxygen 3-4L prn. AIDA Bird [phone].  Last Note: 10/16/24  Plan: TBD  Needs: Wean off oxygen  Hand off sent: NA  Transport: Family    Next Steps: RNCM to follow for medical progression, recommendations, and final discharge plan.     Mary Penn RN

## 2024-10-17 ENCOUNTER — APPOINTMENT (OUTPATIENT)
Dept: ULTRASOUND IMAGING | Facility: HOSPITAL | Age: 74
DRG: 189 | End: 2024-10-17
Attending: INTERNAL MEDICINE
Payer: COMMERCIAL

## 2024-10-17 ENCOUNTER — APPOINTMENT (OUTPATIENT)
Dept: RADIOLOGY | Facility: HOSPITAL | Age: 74
DRG: 189 | End: 2024-10-17
Payer: COMMERCIAL

## 2024-10-17 ENCOUNTER — APPOINTMENT (OUTPATIENT)
Dept: INTERPRETER SERVICES | Facility: CLINIC | Age: 74
End: 2024-10-17
Payer: COMMERCIAL

## 2024-10-17 LAB
GLUCOSE BLDC GLUCOMTR-MCNC: 176 MG/DL (ref 70–99)
GLUCOSE BLDC GLUCOMTR-MCNC: 197 MG/DL (ref 70–99)
GLUCOSE BLDC GLUCOMTR-MCNC: 278 MG/DL (ref 70–99)
GLUCOSE BLDC GLUCOMTR-MCNC: 290 MG/DL (ref 70–99)
GLUCOSE BLDC GLUCOMTR-MCNC: 386 MG/DL (ref 70–99)
TROPONIN T SERPL HS-MCNC: 47 NG/L

## 2024-10-17 PROCEDURE — 94640 AIRWAY INHALATION TREATMENT: CPT | Mod: 76

## 2024-10-17 PROCEDURE — 999N000157 HC STATISTIC RCP TIME EA 10 MIN

## 2024-10-17 PROCEDURE — 94640 AIRWAY INHALATION TREATMENT: CPT

## 2024-10-17 PROCEDURE — 36415 COLL VENOUS BLD VENIPUNCTURE: CPT

## 2024-10-17 PROCEDURE — 93010 ELECTROCARDIOGRAM REPORT: CPT | Performed by: STUDENT IN AN ORGANIZED HEALTH CARE EDUCATION/TRAINING PROGRAM

## 2024-10-17 PROCEDURE — 93005 ELECTROCARDIOGRAM TRACING: CPT

## 2024-10-17 PROCEDURE — 250N000011 HC RX IP 250 OP 636: Performed by: INTERNAL MEDICINE

## 2024-10-17 PROCEDURE — 76705 ECHO EXAM OF ABDOMEN: CPT

## 2024-10-17 PROCEDURE — 120N000001 HC R&B MED SURG/OB

## 2024-10-17 PROCEDURE — 250N000013 HC RX MED GY IP 250 OP 250 PS 637: Performed by: INTERNAL MEDICINE

## 2024-10-17 PROCEDURE — 250N000013 HC RX MED GY IP 250 OP 250 PS 637: Performed by: FAMILY MEDICINE

## 2024-10-17 PROCEDURE — 84484 ASSAY OF TROPONIN QUANT: CPT

## 2024-10-17 PROCEDURE — 250N000009 HC RX 250

## 2024-10-17 PROCEDURE — 99232 SBSQ HOSP IP/OBS MODERATE 35: CPT | Performed by: INTERNAL MEDICINE

## 2024-10-17 PROCEDURE — 71045 X-RAY EXAM CHEST 1 VIEW: CPT

## 2024-10-17 PROCEDURE — 250N000009 HC RX 250: Performed by: EMERGENCY MEDICINE

## 2024-10-17 RX ORDER — FUROSEMIDE 10 MG/ML
20 INJECTION INTRAMUSCULAR; INTRAVENOUS EVERY 8 HOURS
Status: DISPENSED | OUTPATIENT
Start: 2024-10-17 | End: 2024-10-18

## 2024-10-17 RX ORDER — IPRATROPIUM BROMIDE AND ALBUTEROL SULFATE 2.5; .5 MG/3ML; MG/3ML
3 SOLUTION RESPIRATORY (INHALATION)
Status: DISCONTINUED | OUTPATIENT
Start: 2024-10-17 | End: 2024-10-21 | Stop reason: HOSPADM

## 2024-10-17 RX ORDER — GUAIFENESIN 600 MG/1
600 TABLET, EXTENDED RELEASE ORAL 2 TIMES DAILY
Status: COMPLETED | OUTPATIENT
Start: 2024-10-17 | End: 2024-10-21

## 2024-10-17 RX ADMIN — PANTOPRAZOLE SODIUM 40 MG: 40 TABLET, DELAYED RELEASE ORAL at 09:28

## 2024-10-17 RX ADMIN — IPRATROPIUM BROMIDE AND ALBUTEROL SULFATE 3 ML: .5; 3 SOLUTION RESPIRATORY (INHALATION) at 15:01

## 2024-10-17 RX ADMIN — OXYCODONE HYDROCHLORIDE 5 MG: 5 TABLET ORAL at 21:51

## 2024-10-17 RX ADMIN — DOXYCYCLINE 100 MG: 100 CAPSULE ORAL at 09:29

## 2024-10-17 RX ADMIN — METHYLPREDNISOLONE SODIUM SUCCINATE 40 MG: 40 INJECTION, POWDER, FOR SOLUTION INTRAMUSCULAR; INTRAVENOUS at 04:18

## 2024-10-17 RX ADMIN — INSULIN ASPART 2 UNITS: 100 INJECTION, SOLUTION INTRAVENOUS; SUBCUTANEOUS at 13:52

## 2024-10-17 RX ADMIN — ALLOPURINOL 300 MG: 300 TABLET ORAL at 09:28

## 2024-10-17 RX ADMIN — IPRATROPIUM BROMIDE AND ALBUTEROL SULFATE 3 ML: .5; 3 SOLUTION RESPIRATORY (INHALATION) at 12:37

## 2024-10-17 RX ADMIN — DOXYCYCLINE 100 MG: 100 CAPSULE ORAL at 20:25

## 2024-10-17 RX ADMIN — FUROSEMIDE 20 MG: 10 INJECTION, SOLUTION INTRAMUSCULAR; INTRAVENOUS at 17:51

## 2024-10-17 RX ADMIN — ALLOPURINOL 300 MG: 300 TABLET ORAL at 20:25

## 2024-10-17 RX ADMIN — ASPIRIN 81 MG: 81 TABLET, COATED ORAL at 09:28

## 2024-10-17 RX ADMIN — SPIRONOLACTONE 25 MG: 25 TABLET, FILM COATED ORAL at 09:28

## 2024-10-17 RX ADMIN — Medication 25 MCG: at 09:28

## 2024-10-17 RX ADMIN — IPRATROPIUM BROMIDE AND ALBUTEROL SULFATE 3 ML: .5; 3 SOLUTION RESPIRATORY (INHALATION) at 22:00

## 2024-10-17 RX ADMIN — INSULIN HUMAN 10 UNITS: 100 INJECTION, SUSPENSION SUBCUTANEOUS at 09:32

## 2024-10-17 RX ADMIN — PANTOPRAZOLE SODIUM 40 MG: 40 TABLET, DELAYED RELEASE ORAL at 18:00

## 2024-10-17 RX ADMIN — IPRATROPIUM BROMIDE AND ALBUTEROL SULFATE 3 ML: .5; 3 SOLUTION RESPIRATORY (INHALATION) at 20:05

## 2024-10-17 RX ADMIN — BUMETANIDE 1 MG: 1 TABLET ORAL at 09:28

## 2024-10-17 RX ADMIN — GUAIFENESIN 600 MG: 600 TABLET ORAL at 20:25

## 2024-10-17 RX ADMIN — ROSUVASTATIN CALCIUM 40 MG: 40 TABLET, FILM COATED ORAL at 09:28

## 2024-10-17 RX ADMIN — AMLODIPINE BESYLATE 10 MG: 5 TABLET ORAL at 09:28

## 2024-10-17 RX ADMIN — ISOSORBIDE MONONITRATE 30 MG: 30 TABLET, EXTENDED RELEASE ORAL at 09:29

## 2024-10-17 RX ADMIN — METHYLPREDNISOLONE SODIUM SUCCINATE 40 MG: 40 INJECTION, POWDER, FOR SOLUTION INTRAMUSCULAR; INTRAVENOUS at 17:46

## 2024-10-17 RX ADMIN — LOSARTAN POTASSIUM 100 MG: 50 TABLET, FILM COATED ORAL at 09:28

## 2024-10-17 RX ADMIN — IPRATROPIUM BROMIDE AND ALBUTEROL SULFATE 3 ML: .5; 3 SOLUTION RESPIRATORY (INHALATION) at 08:20

## 2024-10-17 RX ADMIN — INSULIN ASPART 1 UNITS: 100 INJECTION, SOLUTION INTRAVENOUS; SUBCUTANEOUS at 09:32

## 2024-10-17 RX ADMIN — INSULIN ASPART 3 UNITS: 100 INJECTION, SOLUTION INTRAVENOUS; SUBCUTANEOUS at 17:46

## 2024-10-17 RX ADMIN — FUROSEMIDE 20 MG: 10 INJECTION, SOLUTION INTRAMUSCULAR; INTRAVENOUS at 11:55

## 2024-10-17 ASSESSMENT — ACTIVITIES OF DAILY LIVING (ADL)
ADLS_ACUITY_SCORE: 34

## 2024-10-17 NOTE — PLAN OF CARE
Problem: Gas Exchange Impaired  Goal: Optimal Gas Exchange  Outcome: Progressing     Problem: Glycemic Control Impaired  Goal: Blood Glucose Level Within Targeted Range  Outcome: Progressing   Goal Outcome Evaluation:       VSS on RA. A&Ox4. Hmong speaking,  utilized. Denies pain currently. Up SBA. IV lasix 1 time order given. Will go for paracentesis later today. Family visiting at bedside.

## 2024-10-17 NOTE — PLAN OF CARE
Problem: Chest Pain  Goal: Resolution of Chest Pain Symptoms  Outcome: Progressing     Problem: Gas Exchange Impaired  Goal: Optimal Gas Exchange  Intervention: Optimize Oxygenation and Ventilation  Recent Flowsheet Documentation  Taken 10/16/2024 1074 by Stepan Ramos RN  Head of Bed (HOB) Positioning: HOB at 30-45 degrees   Goal Outcome Evaluation:       Patient complained of mid chest and throat pain rated 3/10 when he coughs. Given prn Tylenol 650 mg at 2355, pain relieved afterwards. Patient's O2 sats 93% room air. Lung sounds coarse with expiratory wheezes. No shortness of breath at rest, states has dyspnea on exertion. Performed breathing exercises using incentive spirometer. On scheduled duonebs, solumedrol and diuretics. Uses urinal, voids well without difficulty.

## 2024-10-17 NOTE — PROGRESS NOTES
[facility]    Medicine Progress Note - Hospitalist Service    Date of Admission:  10/13/2024    Assessment & Plan   74-year-old with history of bronchitis, CAD not on aspirin who presented with shortness of breath and chest pain.  Serial troponins are trending down.  proBNP is negative.  Patient was treated with steroids nebs and antibiotics and is improving.    Acute respiratory insufficiency likely due to bronchitis now off BiPAP  -- Wean of oxygen not improving quickly.  -- BNP is negative.  Echocardiogram is unremarkable.  However on clinical examination patient appears fluid overloaded with raised JVD and bilateral crackles.  Given poor response to IV steroids, will Perichlor trial of IV Lasix  -- Continue IV steroids, nebs, ordered Mucinex.  -- Continue doxycycline  -- ordered iV Lasix 20 mg 3 times daily as empirical trial on 10/17     Chest pain likely musculoskeletal since it is reproducible  -- Restart aspirin 81 mg as is for ordered in the past  -- Continue Imdur  --Patient has had recent angiogram with unchanged findings of 80% stenosis of the distal LAD and massive distal RCA  --On his last discharge, metoprolol was added to his regimen but is unclear why it was discontinued.  --CT chest negative for PE  -- Use morphine and oxycodone for pain control  -- Troponin x 2 has been trending down  -- Ordered metoprolol 25 mg once daily if symptoms does not resolve  -- He is a poor candidate for dual antiplatelet therapy due to previous CNS bleed.  -- May require stand-alone PTCA of the apical LAD segment if symptoms persist or recur    Hyperglycemia likely due to steroid  Lab Results   Component Value Date    A1C 7.2 10/14/2024    A1C 7.4 04/25/2024    A1C 5.8 03/17/2017    A1C 5.9 09/01/2014    A1C 5.8 03/11/2013   -- Currently uncontrolled due to steroids.  De-escalate steroids to once daily  -- Improved control 10 units NPH twice daily       History of hemorrhagic stroke but  "tolerates baby aspirin at home  -- Patient stopped taking baby aspirin at home    Suspected VICTORINA  --Has had nocturnal hypoxia during his hospital stay on previous admission  --Need outpatient follow-up    Sore throat  Ordered Cepacol lozenges     Diet: Combination Diet Moderate Consistent Carb (60 g CHO per Meal) Diet; Low Saturated Fat Na <2400mg Diet, No Caffeine Diet    DVT Prophylaxis: Low Risk/Ambulatory with no VTE prophylaxis indicated  Eugene Catheter: Not present  Lines: None     Cardiac Monitoring: None  Code Status: Full Code      Clinically Significant Risk Factors                   # Hypertension: Noted on problem list          # DMII: A1C = 7.2 % (Ref range: <5.7 %) within past 6 months, PRESENT ON ADMISSION  # Obesity: Estimated body mass index is 31.19 kg/m  as calculated from the following:    Height as of this encounter: 1.6 m (5' 3\").    Weight as of this encounter: 79.9 kg (176 lb 1.6 oz)., PRESENT ON ADMISSION       # Financial/Environmental Concerns: none         Disposition Plan     Medically Ready for Discharge: Anticipated Tomorrow             Valdemar Whittaker MD  Hospitalist Service  [facility]  Securely message with [employer] (more info)  Text page via [facility] Paging/Directory   ______________________________________________________________________    Interval History   Shortness of breath is better than yesterday.  Has 1-2 chest pain at rest  Occasional coughing repeat chest x-ray from yesterday did not show any new changes.  Patient claims voice has changed  [employer]  used  Review of system is positive for throat pain.  Physical Exam   Vital Signs: Temp: 97.6  F (36.4  C) Temp src: Oral BP: 136/63 Pulse: 86   Resp: 18 SpO2: 93 % O2 Device: None (Room air) Oxygen Delivery: 1 LPM  Weight: 176 lbs 1.6 oz    Bilateral crackles and wheezing which does not improve on coughing  Chest pain is reproducible with pressure over the costochondral margin  JVD seen with " hepatojugular reflux  Abdomen is distended  No inspiratory wheezing heard  Posterior pharynx is injected  Medical Decision Making       35 MINUTES SPENT BY ME on the date of service doing chart review, history, exam, documentation & further activities per the note.  MANAGEMENT DISCUSSED with the following over the past 24 hours: Patient   NOTE(S)/MEDICAL RECORDS REVIEWED over the past 24 hours: Nursing       Data         Imaging results reviewed over the past 24 hrs:   Recent Results (from the past 24 hour(s))   XR Chest Port 1 View    Narrative    EXAM: XR CHEST PORT 1 VIEW  LOCATION: [facility]  DATE: 10/16/2024    INDICATION: Ongoing hypoxia.  COMPARISON: 10/14/2024 CTA chest and CXR.      Impression    IMPRESSION: Shallow inspiration and prominent paracardial mediastinal fat deposition further exaggerate mild stable cardiac enlargement. Pulmonary vascularity within normal limits. Shallow inspiration/low lung volumes. Lungs are clear with no focal   pulmonary consolidation. No pleural effusion. No pneumothorax.   US Abdomen Limited    Narrative    EXAM: US ABDOMEN LIMITED  LOCATION: [facility]  DATE: 10/17/2024    INDICATION: abd distension. Ascites evaluation  COMPARISON: None.  TECHNIQUE: Limited abdominal ultrasound.    FINDINGS: Imaging performed over all 4 quadrants. No ascites seen.

## 2024-10-17 NOTE — PLAN OF CARE
Problem: Adult Inpatient Plan of Care  Goal: Plan of Care Review  Description: The Plan of Care Review/Shift note should be completed every shift.  The Outcome Evaluation is a brief statement about your assessment that the patient is improving, declining, or no change.  This information will be displayed automatically on your shift  note.  Outcome: Progressing   Goal Outcome Evaluation:       Patient and family arrived to floor from P3.

## 2024-10-17 NOTE — TREATMENT PLAN
Respiratory Treatment Plan     Patient Score: 4  Patient Acuity: 5    Clinical Indication for Therapy: prevent atelectasis, expiratory wheezing on exam    Therapy Ordered:   Aerosol Therapy: DuoNeb QID    Volume Expansion: Incentive spirometry QID - encourage pt to perform 10 reps Q1H while awake      History:   -Smoking History: no         Assessment Summary: Patient admitted for SOB and chest pain. History of heart failure and no smoking history. Unknown pulmonary history.     Will continue duoneb with lower acuity as patient does have significant increase in aeration post treatment.      Current Oxygen Usage: Room air   Chest X-Ray: 10/16 Clear Lungs, shallow inspiration      Pippa Suárez, RT  10/17/2024

## 2024-10-18 LAB
ANION GAP SERPL CALCULATED.3IONS-SCNC: 14 MMOL/L (ref 7–15)
ATRIAL RATE - MUSE: 108 BPM
BUN SERPL-MCNC: 52.5 MG/DL (ref 8–23)
CALCIUM SERPL-MCNC: 9.3 MG/DL (ref 8.8–10.4)
CHLORIDE SERPL-SCNC: 96 MMOL/L (ref 98–107)
CREAT SERPL-MCNC: 1.74 MG/DL (ref 0.67–1.17)
DIASTOLIC BLOOD PRESSURE - MUSE: NORMAL MMHG
EGFRCR SERPLBLD CKD-EPI 2021: 41 ML/MIN/1.73M2
GLUCOSE BLDC GLUCOMTR-MCNC: 211 MG/DL (ref 70–99)
GLUCOSE BLDC GLUCOMTR-MCNC: 241 MG/DL (ref 70–99)
GLUCOSE BLDC GLUCOMTR-MCNC: 318 MG/DL (ref 70–99)
GLUCOSE BLDC GLUCOMTR-MCNC: 327 MG/DL (ref 70–99)
GLUCOSE BLDC GLUCOMTR-MCNC: 332 MG/DL (ref 70–99)
GLUCOSE BLDC GLUCOMTR-MCNC: 348 MG/DL (ref 70–99)
GLUCOSE SERPL-MCNC: 224 MG/DL (ref 70–99)
HCO3 SERPL-SCNC: 28 MMOL/L (ref 22–29)
HOLD SPECIMEN: NORMAL
HOLD SPECIMEN: NORMAL
INTERPRETATION ECG - MUSE: NORMAL
P AXIS - MUSE: 23 DEGREES
POTASSIUM SERPL-SCNC: 4.1 MMOL/L (ref 3.4–5.3)
PR INTERVAL - MUSE: 154 MS
QRS DURATION - MUSE: 96 MS
QT - MUSE: 334 MS
QTC - MUSE: 447 MS
R AXIS - MUSE: -10 DEGREES
SODIUM SERPL-SCNC: 138 MMOL/L (ref 135–145)
SYSTOLIC BLOOD PRESSURE - MUSE: NORMAL MMHG
T AXIS - MUSE: 79 DEGREES
TROPONIN T SERPL HS-MCNC: 48 NG/L
VENTRICULAR RATE- MUSE: 108 BPM

## 2024-10-18 PROCEDURE — 120N000001 HC R&B MED SURG/OB

## 2024-10-18 PROCEDURE — 94640 AIRWAY INHALATION TREATMENT: CPT

## 2024-10-18 PROCEDURE — 84484 ASSAY OF TROPONIN QUANT: CPT

## 2024-10-18 PROCEDURE — 250N000013 HC RX MED GY IP 250 OP 250 PS 637: Performed by: FAMILY MEDICINE

## 2024-10-18 PROCEDURE — 250N000011 HC RX IP 250 OP 636: Performed by: INTERNAL MEDICINE

## 2024-10-18 PROCEDURE — 250N000009 HC RX 250: Performed by: EMERGENCY MEDICINE

## 2024-10-18 PROCEDURE — 250N000013 HC RX MED GY IP 250 OP 250 PS 637: Performed by: INTERNAL MEDICINE

## 2024-10-18 PROCEDURE — 94640 AIRWAY INHALATION TREATMENT: CPT | Mod: 76

## 2024-10-18 PROCEDURE — 36415 COLL VENOUS BLD VENIPUNCTURE: CPT

## 2024-10-18 PROCEDURE — 36415 COLL VENOUS BLD VENIPUNCTURE: CPT | Performed by: INTERNAL MEDICINE

## 2024-10-18 PROCEDURE — P9045 ALBUMIN (HUMAN), 5%, 250 ML: HCPCS | Performed by: INTERNAL MEDICINE

## 2024-10-18 PROCEDURE — 80048 BASIC METABOLIC PNL TOTAL CA: CPT | Performed by: INTERNAL MEDICINE

## 2024-10-18 PROCEDURE — 999N000157 HC STATISTIC RCP TIME EA 10 MIN

## 2024-10-18 PROCEDURE — 99233 SBSQ HOSP IP/OBS HIGH 50: CPT | Performed by: INTERNAL MEDICINE

## 2024-10-18 RX ORDER — PIPERACILLIN SODIUM, TAZOBACTAM SODIUM 3; .375 G/15ML; G/15ML
3.38 INJECTION, POWDER, LYOPHILIZED, FOR SOLUTION INTRAVENOUS ONCE
Status: COMPLETED | OUTPATIENT
Start: 2024-10-18 | End: 2024-10-18

## 2024-10-18 RX ORDER — PIPERACILLIN SODIUM, TAZOBACTAM SODIUM 3; .375 G/15ML; G/15ML
3.38 INJECTION, POWDER, LYOPHILIZED, FOR SOLUTION INTRAVENOUS EVERY 8 HOURS
Status: DISCONTINUED | OUTPATIENT
Start: 2024-10-18 | End: 2024-10-21

## 2024-10-18 RX ORDER — METHYLPREDNISOLONE SODIUM SUCCINATE 40 MG/ML
40 INJECTION INTRAMUSCULAR; INTRAVENOUS EVERY 24 HOURS
Status: DISCONTINUED | OUTPATIENT
Start: 2024-10-19 | End: 2024-10-19

## 2024-10-18 RX ADMIN — FUROSEMIDE 20 MG: 10 INJECTION, SOLUTION INTRAMUSCULAR; INTRAVENOUS at 03:06

## 2024-10-18 RX ADMIN — ROSUVASTATIN CALCIUM 40 MG: 40 TABLET, FILM COATED ORAL at 09:50

## 2024-10-18 RX ADMIN — ALBUMIN HUMAN 12.5 G: 0.05 INJECTION, SOLUTION INTRAVENOUS at 17:42

## 2024-10-18 RX ADMIN — INSULIN ASPART 5 UNITS: 100 INJECTION, SOLUTION INTRAVENOUS; SUBCUTANEOUS at 17:23

## 2024-10-18 RX ADMIN — ALLOPURINOL 300 MG: 300 TABLET ORAL at 10:24

## 2024-10-18 RX ADMIN — IPRATROPIUM BROMIDE AND ALBUTEROL SULFATE 3 ML: .5; 3 SOLUTION RESPIRATORY (INHALATION) at 16:45

## 2024-10-18 RX ADMIN — METHYLPREDNISOLONE SODIUM SUCCINATE 40 MG: 40 INJECTION, POWDER, FOR SOLUTION INTRAMUSCULAR; INTRAVENOUS at 03:06

## 2024-10-18 RX ADMIN — LOSARTAN POTASSIUM 100 MG: 50 TABLET, FILM COATED ORAL at 09:50

## 2024-10-18 RX ADMIN — IPRATROPIUM BROMIDE AND ALBUTEROL SULFATE 3 ML: .5; 3 SOLUTION RESPIRATORY (INHALATION) at 08:54

## 2024-10-18 RX ADMIN — PIPERACILLIN AND TAZOBACTAM 3.38 G: 3; .375 INJECTION, POWDER, FOR SOLUTION INTRAVENOUS at 09:51

## 2024-10-18 RX ADMIN — PANTOPRAZOLE SODIUM 40 MG: 40 TABLET, DELAYED RELEASE ORAL at 17:26

## 2024-10-18 RX ADMIN — ASPIRIN 81 MG: 81 TABLET, COATED ORAL at 09:50

## 2024-10-18 RX ADMIN — INSULIN ASPART 4 UNITS: 100 INJECTION, SOLUTION INTRAVENOUS; SUBCUTANEOUS at 14:26

## 2024-10-18 RX ADMIN — GUAIFENESIN 600 MG: 600 TABLET ORAL at 09:51

## 2024-10-18 RX ADMIN — PIPERACILLIN AND TAZOBACTAM 3.38 G: 3; .375 INJECTION, POWDER, FOR SOLUTION INTRAVENOUS at 18:00

## 2024-10-18 RX ADMIN — GUAIFENESIN 600 MG: 600 TABLET ORAL at 20:33

## 2024-10-18 RX ADMIN — IPRATROPIUM BROMIDE AND ALBUTEROL SULFATE 3 ML: .5; 3 SOLUTION RESPIRATORY (INHALATION) at 12:12

## 2024-10-18 RX ADMIN — INSULIN ASPART 2 UNITS: 100 INJECTION, SOLUTION INTRAVENOUS; SUBCUTANEOUS at 09:54

## 2024-10-18 RX ADMIN — Medication 25 MCG: at 09:50

## 2024-10-18 RX ADMIN — IPRATROPIUM BROMIDE AND ALBUTEROL SULFATE 3 ML: .5; 3 SOLUTION RESPIRATORY (INHALATION) at 20:15

## 2024-10-18 RX ADMIN — PANTOPRAZOLE SODIUM 40 MG: 40 TABLET, DELAYED RELEASE ORAL at 10:24

## 2024-10-18 RX ADMIN — ACETAMINOPHEN 650 MG: 325 TABLET ORAL at 00:47

## 2024-10-18 RX ADMIN — ISOSORBIDE MONONITRATE 30 MG: 30 TABLET, EXTENDED RELEASE ORAL at 09:51

## 2024-10-18 RX ADMIN — ALLOPURINOL 300 MG: 300 TABLET ORAL at 20:33

## 2024-10-18 ASSESSMENT — ACTIVITIES OF DAILY LIVING (ADL)
ADLS_ACUITY_SCORE: 34

## 2024-10-18 NOTE — PROGRESS NOTES
[facility]    Medicine Progress Note - Hospitalist Service    Date of Admission:  10/13/2024    Assessment & Plan   74-year-old with history of bronchitis, CAD not on aspirin who presented with shortness of breath and chest pain.  Serial troponins are trending down.  proBNP is negative.  Patient was treated with steroids nebs and antibiotics and is improving..  Chest x-ray on 10/17 shows left-sided infiltrate.  Started on IV Zosyn for hospital-acquired pneumonia.  Creatinine worsened on 10/18 due to IV Lasix and therefore this has been stopped.  Started on IV albumin    Acute respiratory insufficiency likely due to bronchitis now off BiPAP  -- Initially treated with IV steroids, nebs and doxycycline but did not improve.  Started on IV Lasix low-dose on 10/17 but creatinine worsened on 10/18 and therefore this has been discontinued however patient is feeling better after IV Lasix.  -- Sugars are elevated and therefore decrease steroids to once daily.  -- IV Zosyn started on 10/18 due to left-sided infiltrate seen on x-ray done on 10/17.  Likely he has new hospital-acquired pneumonia.  Devious x-rays have been negative    Hospital-acquired pneumonia new on 10/18  -- Review of chest x-ray did not show left-sided pneumonia  -- Surgery done on 10/17 showed left sided pneumonia  -- Change doxycycline to IV Zosyn.  Recommend at least 5-day course    Chest pain likely musculoskeletal since it is reproducible-controlled with pain medication  --Serial troponins done so far have not shown increasing trend  -- Restart aspirin 81 mg as is for ordered in the past  -- Continue Imdur  --Patient has had recent angiogram with unchanged findings of 80% stenosis of the distal LAD and massive distal RCA  --On his last discharge, metoprolol was added to his regimen but is unclear why it was discontinued.  --CT chest negative for PE  -- Use morphine and oxycodone for pain control    -- Ordered metoprolol 25 mg  once daily if symptoms does not resolve  -- He is a poor candidate for dual antiplatelet therapy due to previous CNS bleed.  -- May require stand-alone PTCA of the apical LAD segment if symptoms persist or recur    Hyperglycemia likely due to steroid poor control due to IV steroid  Lab Results   Component Value Date    A1C 7.2 10/14/2024    A1C 7.4 04/25/2024    A1C 5.8 03/17/2017    A1C 5.9 09/01/2014    A1C 5.8 03/11/2013   De-escalate steroids to once daily    PANKAJ on CKD 3 --new on 10/18  -- Baseline creatinine of 1.5  --Creatinine worsened to 1.7 on 10/18 due to empirical diuresis done on 10/17 since patient was not getting better on usual treatment for acute bronchitis  --DC IV Lasix and hold Bumex.  Hold BP medications to allow kidneys to recover.  Order 12.5 g of albumin to allow renal perfusion with cardiorenal syndrome.  -- Hold BP medications.  Recheck BMP next a.m.       Moderate aortic regurgitation seen on echo  -- EF of greater than 60% and therefore no urgent valvular placement indicated  -- Patient remains symptomatic then consider follow-up in valvular clinic  -- Recommend echocardiogram every 6 months.    History of hemorrhagic stroke but tolerates baby aspirin at home  -- Patient stopped taking baby aspirin at home  -- Patient has been tolerating    Suspected VICTORINA  --Has had nocturnal hypoxia during his hospital stay on previous admission  --Need outpatient follow-up    Sore throat  Continue Cepacol lozenges     Diet: Combination Diet Moderate Consistent Carb (60 g CHO per Meal) Diet; 2 gm NA Diet    DVT Prophylaxis: Low Risk/Ambulatory with no VTE prophylaxis indicated  Eugene Catheter: Not present  Lines: None     Cardiac Monitoring: None  Code Status: Full Code      Clinically Significant Risk Factors          # Hypochloremia: Lowest Cl = 96 mmol/L in last 2 days, will monitor as appropriate         # Acute Kidney Injury, unspecified: based on a >150% or 0.3 mg/dL increase in last creatinine  "compared to past 90 day average, will monitor renal function  # Hypertension: Noted on problem list          # DMII: A1C = 7.2 % (Ref range: <5.7 %) within past 6 months   # Obesity: Estimated body mass index is 31.19 kg/m  as calculated from the following:    Height as of this encounter: 1.6 m (5' 3\").    Weight as of this encounter: 79.9 kg (176 lb 1.6 oz).        # Financial/Environmental Concerns: none         Disposition Plan     Medically Ready for Discharge: Anticipate 2 to 4 days             Valdemar Whittaker MD  Hospitalist Service  [facility]  Securely message with [employer] (more info)  Text page via [employer] Paging/Directory   ______________________________________________________________________    Interval History   Patient feels better than yesterday.  Had chest x-ray yesterday which shows left lateral lung.  Previous x-rays had not shown the same.  Discussed with the family.  Patient wanted to go home AGAINST MEDICAL ADVICE but after talking to the family he decided to stay in the hospital.  Chest pain is 0 out of 10  Creatinine increased to 1.7 and therefore Lasix has been discontinued    Physical Exam   Vital Signs: Temp: 97.8  F (36.6  C) Temp src: Oral BP: 130/64 Pulse: 86   Resp: 21 SpO2: 91 % O2 Device: None (Room air) Oxygen Delivery: 2 LPM  Weight: 176 lbs 1.6 oz    Patient able to speak full sentences  No wheezing with scattered crackles which improves with coughing  No leg edema  Abdominal nondistended  No JVD  No hepatojugular reflux  Early diastolic murmur enhanced by leaning forward    Medical Decision Making       35 MINUTES SPENT BY ME on the date of service doing chart review, history, exam, documentation & further activities per the note.  MANAGEMENT DISCUSSED with the following over the past 24 hours: Patient   NOTE(S)/MEDICAL RECORDS REVIEWED over the past 24 hours: Nursing       Data     I have personally reviewed the following data over the past 24 hrs:    N/A  \   " N/A   / N/A     138 96 (L) 52.5 (H) /  318 (H)   4.1 28 1.74 (H) \     Trop: 48 (H) BNP: N/A       Imaging results reviewed over the past 24 hrs:   Recent Results (from the past 24 hour(s))   XR Chest Port 1 View    Narrative    EXAM: XR CHEST PORT 1 VIEW  LOCATION: [facility]  DATE: 10/17/2024    INDICATION: worsening shortness of breath  COMPARISON: 10/16/2024.    FINDINGS: The heart is enlarged. There is no pulmonary edema. The thoracic aorta is calcified. Mild infiltrate in the left lung laterally. Band of atelectasis at the right lung base. The lungs are otherwise clear. No pneumothorax. Degenerative disease in   the spine.      Impression    IMPRESSION: There is a new mild left lung infiltrate.

## 2024-10-18 NOTE — PLAN OF CARE
Problem: Glycemic Control Impaired  Goal: Blood Glucose Level Within Targeted Range  Outcome: Progressing     Problem: Gas Exchange Impaired  Goal: Optimal Gas Exchange  Outcome: Progressing   Goal Outcome Evaluation:       VSS on RA. A&Ox4. Hmong speaking,  utilized. Denies pain/CP currently. Up SBA. IV abx started. BG elevated on IV steroids. Family visiting at bedside.

## 2024-10-18 NOTE — PROGRESS NOTES
Care Management Follow Up    Length of Stay (days): 4    Expected Discharge Date: 10/21/2024    Anticipated Discharge Plan:   return home     Transportation: Confirmed Family/friend    PT Recommendations:    OT Recommendations:        Barriers to Discharge: medical stability    Prior Living Situation: Pt lives w/son Kaleigh Singh, wife lives w/the other son, wife is his PCA for 8 hrs per day and has WC/Walker/Cane.     Discussed  Partnership in Safe Discharge Planning  document with patient/family: NA     Handoff Completed: No, handoff not indicated or clinically appropriate    Patient/Spokesperson Updated: No    Additional Information:  SW reviewed chart and discussed updates with MD. Anticipate patient will remain hospitalized through the weekend. Plan to return home to prior living environment at time of discharge.     Next Steps: Following care progression to assist as needed with safe discharge planning     PARRIS Jha at 11:02 AM on 10/18/24

## 2024-10-18 NOTE — PROGRESS NOTES
Notified of worsening shortness of breath and chest pain in patient admitted for acute respiratory failure felt most likely to be due to bronchitis. Patient also noted to have reproducible chest pain on admission felt to be musculoskeletal in nature. Is currently on room air but requesting something PRN for shortness of breath and chest tightness. Is already on IV steroids, IV lasix, antibitiotics.     - chest XR   - EKG   - Trop   - PRN duonebs added to scheduled duonebs     Janelle Schumacher MD PGY-3  [facility] Residency    House Officer     Addendum:   Chest XR shows new mid left lung infiltrate, suggesting possible worsening infectious picture. Patient is already on doxycycline for presumed pneumonia - would recommend continuing this. EKG is stable from prior. Trop just mildly elevated from prior. Symptoms improved but not resolved from additional duoneb. Discussed plan with nursing: continue Duonebs PRN in addition to scheduled, trail oxygen for comfort. Patient is mildly tachycardic, but presume this to be secondary to frequent albuterol nebs. If patient spikes fever, can consider broadening of antibiotics. Will trend trop as well.     Janelle Schumacher MD PGY-3  [facility] Residency    House Officer

## 2024-10-18 NOTE — PLAN OF CARE
Problem: Chest Pain  Goal: Resolution of Chest Pain Symptoms  10/18/2024 0057 by An Hill, RN  Outcome: Not Progressing  10/18/2024 0057 by An Hill, RN  Outcome: Not Progressing  Intervention: Manage Acute Chest Pain  Recent Flowsheet Documentation  Taken 10/17/2024 2200 by An Hill, SUMMER  Chest Pain Intervention: 12-lead ECG obtained     Problem: Gas Exchange Impaired  Goal: Optimal Gas Exchange  Intervention: Optimize Oxygenation and Ventilation  Recent Flowsheet Documentation  Taken 10/17/2024 2200 by An Hill, SUMMER  Head of Bed (HOB) Positioning: HOB at 30-45 degrees  Taken 10/17/2024 1730 by An Hill, SUMMER  Head of Bed (HOB) Positioning: HOB at 30 degrees   Goal Outcome Evaluation:    C/o of epigastric pain near end of shift. Oxycodone given and was effective. MD paged. See previous note. LS coarse and wheezing. Elevated HOB. Urinal within reach.  and 386. Sliding scale covered. Care on going.

## 2024-10-18 NOTE — PLAN OF CARE
Problem: Gas Exchange Impaired  Goal: Optimal Gas Exchange  Outcome: Progressing     Problem: Glycemic Control Impaired  Goal: Blood Glucose Level Within Targeted Range  Outcome: Not Progressing     Problem: Pain Acute  Goal: Optimal Pain Control and Function  Outcome: Progressing   Goal Outcome Evaluation:    Upon initial rounds- patient described his breathing as much better and his epigastric pain is now down to 2 out of 10. Tylenol PRN given. Patient states that having an oxygen is making him comfortable and making his breathing easier. Keeping O2 at 2 liters- 94 %. Continue IV lasix.  Epigastric pain was completely resolved at 0130 after the tylenol dose.  Troponin results 47 and 48. Relayed results to . No additional orders made.

## 2024-10-19 LAB
CREAT SERPL-MCNC: 1.32 MG/DL (ref 0.67–1.17)
EGFRCR SERPLBLD CKD-EPI 2021: 57 ML/MIN/1.73M2
GLUCOSE BLDC GLUCOMTR-MCNC: 154 MG/DL (ref 70–99)
GLUCOSE BLDC GLUCOMTR-MCNC: 160 MG/DL (ref 70–99)
GLUCOSE BLDC GLUCOMTR-MCNC: 243 MG/DL (ref 70–99)
GLUCOSE BLDC GLUCOMTR-MCNC: 298 MG/DL (ref 70–99)
GLUCOSE BLDC GLUCOMTR-MCNC: 325 MG/DL (ref 70–99)

## 2024-10-19 PROCEDURE — 82565 ASSAY OF CREATININE: CPT | Performed by: INTERNAL MEDICINE

## 2024-10-19 PROCEDURE — 120N000001 HC R&B MED SURG/OB

## 2024-10-19 PROCEDURE — 250N000009 HC RX 250: Performed by: EMERGENCY MEDICINE

## 2024-10-19 PROCEDURE — 36415 COLL VENOUS BLD VENIPUNCTURE: CPT | Performed by: INTERNAL MEDICINE

## 2024-10-19 PROCEDURE — 250N000013 HC RX MED GY IP 250 OP 250 PS 637: Performed by: FAMILY MEDICINE

## 2024-10-19 PROCEDURE — 94640 AIRWAY INHALATION TREATMENT: CPT

## 2024-10-19 PROCEDURE — 99232 SBSQ HOSP IP/OBS MODERATE 35: CPT | Performed by: STUDENT IN AN ORGANIZED HEALTH CARE EDUCATION/TRAINING PROGRAM

## 2024-10-19 PROCEDURE — 250N000011 HC RX IP 250 OP 636: Performed by: INTERNAL MEDICINE

## 2024-10-19 PROCEDURE — 94640 AIRWAY INHALATION TREATMENT: CPT | Mod: 76

## 2024-10-19 PROCEDURE — 250N000013 HC RX MED GY IP 250 OP 250 PS 637: Performed by: INTERNAL MEDICINE

## 2024-10-19 PROCEDURE — 999N000157 HC STATISTIC RCP TIME EA 10 MIN

## 2024-10-19 RX ORDER — PREDNISONE 20 MG/1
20 TABLET ORAL DAILY
Status: DISCONTINUED | OUTPATIENT
Start: 2024-10-23 | End: 2024-10-21 | Stop reason: HOSPADM

## 2024-10-19 RX ORDER — PREDNISONE 20 MG/1
40 TABLET ORAL DAILY
Status: DISCONTINUED | OUTPATIENT
Start: 2024-10-20 | End: 2024-10-21 | Stop reason: HOSPADM

## 2024-10-19 RX ORDER — PREDNISONE 5 MG/1
10 TABLET ORAL DAILY
Status: DISCONTINUED | OUTPATIENT
Start: 2024-10-26 | End: 2024-10-21 | Stop reason: HOSPADM

## 2024-10-19 RX ADMIN — GUAIFENESIN 600 MG: 600 TABLET ORAL at 21:12

## 2024-10-19 RX ADMIN — PANTOPRAZOLE SODIUM 40 MG: 40 TABLET, DELAYED RELEASE ORAL at 09:43

## 2024-10-19 RX ADMIN — PIPERACILLIN AND TAZOBACTAM 3.38 G: 3; .375 INJECTION, POWDER, FOR SOLUTION INTRAVENOUS at 09:17

## 2024-10-19 RX ADMIN — PIPERACILLIN AND TAZOBACTAM 3.38 G: 3; .375 INJECTION, POWDER, FOR SOLUTION INTRAVENOUS at 00:38

## 2024-10-19 RX ADMIN — ALLOPURINOL 300 MG: 300 TABLET ORAL at 21:12

## 2024-10-19 RX ADMIN — Medication 25 MCG: at 09:44

## 2024-10-19 RX ADMIN — ZOLPIDEM TARTRATE 2.5 MG: 5 TABLET, FILM COATED ORAL at 21:18

## 2024-10-19 RX ADMIN — INSULIN ASPART 4 UNITS: 100 INJECTION, SOLUTION INTRAVENOUS; SUBCUTANEOUS at 13:03

## 2024-10-19 RX ADMIN — LOSARTAN POTASSIUM 100 MG: 50 TABLET, FILM COATED ORAL at 09:43

## 2024-10-19 RX ADMIN — PIPERACILLIN AND TAZOBACTAM 3.38 G: 3; .375 INJECTION, POWDER, FOR SOLUTION INTRAVENOUS at 16:48

## 2024-10-19 RX ADMIN — IPRATROPIUM BROMIDE AND ALBUTEROL SULFATE 3 ML: .5; 3 SOLUTION RESPIRATORY (INHALATION) at 12:03

## 2024-10-19 RX ADMIN — INSULIN ASPART 4 UNITS: 100 INJECTION, SOLUTION INTRAVENOUS; SUBCUTANEOUS at 16:46

## 2024-10-19 RX ADMIN — ISOSORBIDE MONONITRATE 30 MG: 30 TABLET, EXTENDED RELEASE ORAL at 09:42

## 2024-10-19 RX ADMIN — ASPIRIN 81 MG: 81 TABLET, COATED ORAL at 09:41

## 2024-10-19 RX ADMIN — IPRATROPIUM BROMIDE AND ALBUTEROL SULFATE 3 ML: .5; 3 SOLUTION RESPIRATORY (INHALATION) at 08:14

## 2024-10-19 RX ADMIN — METHYLPREDNISOLONE SODIUM SUCCINATE 40 MG: 40 INJECTION, POWDER, FOR SOLUTION INTRAMUSCULAR; INTRAVENOUS at 05:42

## 2024-10-19 RX ADMIN — INSULIN ASPART 1 UNITS: 100 INJECTION, SOLUTION INTRAVENOUS; SUBCUTANEOUS at 09:42

## 2024-10-19 RX ADMIN — ALLOPURINOL 300 MG: 300 TABLET ORAL at 09:41

## 2024-10-19 RX ADMIN — IPRATROPIUM BROMIDE AND ALBUTEROL SULFATE 3 ML: .5; 3 SOLUTION RESPIRATORY (INHALATION) at 19:55

## 2024-10-19 RX ADMIN — GUAIFENESIN 600 MG: 600 TABLET ORAL at 09:42

## 2024-10-19 RX ADMIN — ROSUVASTATIN CALCIUM 40 MG: 40 TABLET, FILM COATED ORAL at 09:43

## 2024-10-19 RX ADMIN — IPRATROPIUM BROMIDE AND ALBUTEROL SULFATE 3 ML: .5; 3 SOLUTION RESPIRATORY (INHALATION) at 16:27

## 2024-10-19 RX ADMIN — PANTOPRAZOLE SODIUM 40 MG: 40 TABLET, DELAYED RELEASE ORAL at 16:47

## 2024-10-19 ASSESSMENT — ACTIVITIES OF DAILY LIVING (ADL)
ADLS_ACUITY_SCORE: 39
ADLS_ACUITY_SCORE: 34
ADLS_ACUITY_SCORE: 39
ADLS_ACUITY_SCORE: 34
ADLS_ACUITY_SCORE: 34
ADLS_ACUITY_SCORE: 40
ADLS_ACUITY_SCORE: 39
ADLS_ACUITY_SCORE: 34
ADLS_ACUITY_SCORE: 39
ADLS_ACUITY_SCORE: 34
ADLS_ACUITY_SCORE: 39
ADLS_ACUITY_SCORE: 34
ADLS_ACUITY_SCORE: 39
ADLS_ACUITY_SCORE: 39

## 2024-10-19 NOTE — PLAN OF CARE
Problem: Adult Inpatient Plan of Care  Goal: Absence of Hospital-Acquired Illness or Injury  Intervention: Identify and Manage Fall Risk  Recent Flowsheet Documentation  Taken 10/18/2024 1715 by Guerda Brewster RN  Safety Promotion/Fall Prevention:   activity supervised   lighting adjusted   mobility aid in reach   nonskid shoes/slippers when out of bed   patient and family education     Problem: Skin Injury Risk Increased  Goal: Skin Health and Integrity  Intervention: Plan: Nurse Driven Intervention: Moisture Management  Recent Flowsheet Documentation  Taken 10/18/2024 1715 by Guerda Brewster RN  Moisture Interventions: Encourage regular toileting   Goal Outcome Evaluation:       Pt A&Ox4. Up with SBA. VSS on RA. Denies pain. Hmong speaking,  utilized. Family at bedside. Guerda Brewster RN

## 2024-10-19 NOTE — PLAN OF CARE
Problem: Risk for Delirium  Goal: Optimal Coping  10/19/2024 0759 by Camilo García RN  Outcome: Progressing       Problem: Gas Exchange Impaired  Goal: Optimal Gas Exchange  10/19/2024 0759 by Camilo García RN  Outcome: Progressing     Problem: Pain Acute  Goal: Optimal Pain Control and Function  10/19/2024 0759 by Camilo García RN  Outcome: Progressing    Intervention: Prevent or Manage Pain  Recent Flowsheet Documentation  Taken 10/19/2024 0043 by Camilo García RN  Sensory Stimulation Regulation:   care clustered   quiet environment promoted     Goal Outcome Evaluation:      Plan of Care Reviewed With: patient    Overall Patient Progress: improving       A&O x4.  Denies pain.  Hmong speaking.  Family at bedside.  Standby assist.  Plan of care ongoing.

## 2024-10-19 NOTE — PLAN OF CARE
Problem: Adult Inpatient Plan of Care  Goal: Plan of Care Review  Description: The Plan of Care Review/Shift note should be completed every shift.  The Outcome Evaluation is a brief statement about your assessment that the patient is improving, declining, or no change.  This information will be displayed automatically on your shift  note.  Outcome: Progressing   Goal Outcome Evaluation:       Goal of care discussed with patient utilizing  service per andreea. All questions answered and Peter denies any complaints.  Problem: Risk for Delirium  Goal: Improved Attention and Thought Clarity  Outcome: Progressing         Has been using call light appropriately. Ambulates with SBA.  Problem: Gas Exchange Impaired  Goal: Optimal Gas Exchange  Outcome: Progressing     Lungs are wheezy, RT is managing with neb treatments. Mucinex scheduled. Also has IV steroids. Instructed on IS. Appears to understand it's importance.

## 2024-10-19 NOTE — PROGRESS NOTES
[facility]    Medicine Progress Note - Hospitalist Service    Date of Admission:  10/13/2024    Assessment & Plan   74-year-old with history of bronchitis, CAD not on aspirin who presented with shortness of breath and chest pain. Patient was treated with steroids nebs and antibiotics and is improving..  Chest x-ray on 10/17 shows new left-sided infiltrate.  Started on IV Zosyn for hospital-acquired pneumonia.  Creatinine worsened on 10/18 due to IV Lasix and therefore this has been stopped.  S/p IV albumin     Acute respiratory insufficiency , improved   -- Initially treated with IV steroids, nebs and doxycycline but did not improve.    --continue zosyn for HAP    Hospital-acquired pneumonia new on 10/18  -- xray on  10/17 showed left sided pneumonia  -- continue  IV Zosyn.    -- sputum gm stain and culture       Chest pain likely musculoskeletal   -- continue aspirin 81 mg   -- Continue Imdur  --Patient has had recent angiogram with unchanged findings of 80% stenosis of the distal LAD and massive distal RCA  --On his last discharge, metoprolol was added to his regimen but is unclear why it was discontinued.  -- CT chest negative for PE  -- He is a poor candidate for dual antiplatelet therapy due to previous CNS bleed.    Steroid induced hyperglycemia  -Continue NPH insulin while taking steriod  -Monitor blood sugar     PANKAJ on CKD 3   --new on 10/18  -- Baseline creatinine of 1.15  --Creatinine worsened to 1.7 on 10/18   --DC IV Lasix and hold Bumex.   - monitor BMP     Moderate aortic regurgitation seen on echo  -- EF of greater than 60% and therefore no urgent valvular placement indicated  -- Patient remains asymptomatic then consider follow-up in valvular clinic  -- Recommend echocardiogram every 6 months.     History of hemorrhagic stroke but tolerates baby aspirin at home     Suspected VICTORINA  --Has had nocturnal hypoxia during his hospital stay on previous admission  --Need outpatient  "follow-up     Sore throat  Continue Cepacol lozenges          Diet: Combination Diet Moderate Consistent Carb (60 g CHO per Meal) Diet; 2 gm NA Diet  Room Service    DVT Prophylaxis: Ambulate every shift  Eugene Catheter: Not present  Lines: None     Cardiac Monitoring: None  Code Status: Full Code      Clinically Significant Risk Factors          # Hypochloremia: Lowest Cl = 96 mmol/L in last 2 days, will monitor as appropriate          # Hypertension: Noted on problem list          # DMII: A1C = 7.2 % (Ref range: <5.7 %) within past 6 months   # Obesity: Estimated body mass index is 31.19 kg/m  as calculated from the following:    Height as of this encounter: 1.6 m (5' 3\").    Weight as of this encounter: 79.9 kg (176 lb 1.6 oz).      # Financial/Environmental Concerns: none         Disposition Plan     Medically Ready for Discharge: Anticipated Tomorrow         Rod Chanel MD  Hospitalist Service  [facility]  Securely message with [employer] (more info)  Text page via [employer] Paging/Directory   ______________________________________________________________________    Interval History   Patient new to me today.  Chart, labs and imaging results reviewed.  No distress noted.  He is on room air.  Management plan discussed with the patient and he expressed understanding. Hmong language translation was utilized via telephone.    Physical Exam   Vital Signs: Temp: 98  F (36.7  C) Temp src: Oral BP: (!) 143/64 Pulse: 81   Resp: 20 SpO2: 95 % O2 Device: None (Room air)    Weight: 176 lbs 1.6 oz    General Appearance: No distress noted  Respiratory: Good air entry bilaterally  Cardiovascular: S1 and S2 well heard  GI: Soft abdomen, no tenderness, normoactive bowel sounds  Skin: Intact and warm     Medical Decision Making       40 MINUTES SPENT BY ME on the date of service doing chart review, history, exam, documentation & further activities per the note.      Data     "

## 2024-10-20 LAB
ANION GAP SERPL CALCULATED.3IONS-SCNC: 10 MMOL/L (ref 7–15)
BUN SERPL-MCNC: 40.2 MG/DL (ref 8–23)
CALCIUM SERPL-MCNC: 8.8 MG/DL (ref 8.8–10.4)
CHLORIDE SERPL-SCNC: 102 MMOL/L (ref 98–107)
CREAT SERPL-MCNC: 1.15 MG/DL (ref 0.67–1.17)
EGFRCR SERPLBLD CKD-EPI 2021: 67 ML/MIN/1.73M2
ERYTHROCYTE [DISTWIDTH] IN BLOOD BY AUTOMATED COUNT: 13.4 % (ref 10–15)
GLUCOSE BLDC GLUCOMTR-MCNC: 153 MG/DL (ref 70–99)
GLUCOSE BLDC GLUCOMTR-MCNC: 165 MG/DL (ref 70–99)
GLUCOSE BLDC GLUCOMTR-MCNC: 174 MG/DL (ref 70–99)
GLUCOSE BLDC GLUCOMTR-MCNC: 299 MG/DL (ref 70–99)
GLUCOSE BLDC GLUCOMTR-MCNC: 310 MG/DL (ref 70–99)
GLUCOSE SERPL-MCNC: 180 MG/DL (ref 70–99)
HCO3 SERPL-SCNC: 26 MMOL/L (ref 22–29)
HCT VFR BLD AUTO: 44.9 % (ref 40–53)
HGB BLD-MCNC: 14.6 G/DL (ref 13.3–17.7)
MCH RBC QN AUTO: 30.1 PG (ref 26.5–33)
MCHC RBC AUTO-ENTMCNC: 32.5 G/DL (ref 31.5–36.5)
MCV RBC AUTO: 93 FL (ref 78–100)
PLATELET # BLD AUTO: 184 10E3/UL (ref 150–450)
POTASSIUM SERPL-SCNC: 4.3 MMOL/L (ref 3.4–5.3)
RBC # BLD AUTO: 4.85 10E6/UL (ref 4.4–5.9)
SODIUM SERPL-SCNC: 138 MMOL/L (ref 135–145)
WBC # BLD AUTO: 11.2 10E3/UL (ref 4–11)

## 2024-10-20 PROCEDURE — 250N000013 HC RX MED GY IP 250 OP 250 PS 637: Performed by: STUDENT IN AN ORGANIZED HEALTH CARE EDUCATION/TRAINING PROGRAM

## 2024-10-20 PROCEDURE — 250N000012 HC RX MED GY IP 250 OP 636 PS 637: Performed by: STUDENT IN AN ORGANIZED HEALTH CARE EDUCATION/TRAINING PROGRAM

## 2024-10-20 PROCEDURE — 250N000009 HC RX 250: Performed by: EMERGENCY MEDICINE

## 2024-10-20 PROCEDURE — 999N000156 HC STATISTIC RCP CONSULT EA 30 MIN

## 2024-10-20 PROCEDURE — 99232 SBSQ HOSP IP/OBS MODERATE 35: CPT | Performed by: STUDENT IN AN ORGANIZED HEALTH CARE EDUCATION/TRAINING PROGRAM

## 2024-10-20 PROCEDURE — 94640 AIRWAY INHALATION TREATMENT: CPT | Mod: 76

## 2024-10-20 PROCEDURE — 94640 AIRWAY INHALATION TREATMENT: CPT

## 2024-10-20 PROCEDURE — 250N000013 HC RX MED GY IP 250 OP 250 PS 637: Performed by: INTERNAL MEDICINE

## 2024-10-20 PROCEDURE — 250N000011 HC RX IP 250 OP 636: Performed by: INTERNAL MEDICINE

## 2024-10-20 PROCEDURE — 999N000157 HC STATISTIC RCP TIME EA 10 MIN

## 2024-10-20 PROCEDURE — 85027 COMPLETE CBC AUTOMATED: CPT | Performed by: STUDENT IN AN ORGANIZED HEALTH CARE EDUCATION/TRAINING PROGRAM

## 2024-10-20 PROCEDURE — 36415 COLL VENOUS BLD VENIPUNCTURE: CPT | Performed by: STUDENT IN AN ORGANIZED HEALTH CARE EDUCATION/TRAINING PROGRAM

## 2024-10-20 PROCEDURE — 250N000009 HC RX 250

## 2024-10-20 PROCEDURE — 120N000001 HC R&B MED SURG/OB

## 2024-10-20 PROCEDURE — 80048 BASIC METABOLIC PNL TOTAL CA: CPT | Performed by: STUDENT IN AN ORGANIZED HEALTH CARE EDUCATION/TRAINING PROGRAM

## 2024-10-20 PROCEDURE — 250N000013 HC RX MED GY IP 250 OP 250 PS 637: Performed by: FAMILY MEDICINE

## 2024-10-20 RX ORDER — AMOXICILLIN 250 MG
1 CAPSULE ORAL 2 TIMES DAILY
Status: DISCONTINUED | OUTPATIENT
Start: 2024-10-20 | End: 2024-10-21 | Stop reason: HOSPADM

## 2024-10-20 RX ADMIN — ASPIRIN 81 MG: 81 TABLET, COATED ORAL at 08:47

## 2024-10-20 RX ADMIN — LOSARTAN POTASSIUM 100 MG: 50 TABLET, FILM COATED ORAL at 08:49

## 2024-10-20 RX ADMIN — PIPERACILLIN AND TAZOBACTAM 3.38 G: 3; .375 INJECTION, POWDER, FOR SOLUTION INTRAVENOUS at 17:05

## 2024-10-20 RX ADMIN — ALLOPURINOL 300 MG: 300 TABLET ORAL at 08:46

## 2024-10-20 RX ADMIN — ACETAMINOPHEN 650 MG: 325 TABLET ORAL at 21:30

## 2024-10-20 RX ADMIN — PREDNISONE 40 MG: 20 TABLET ORAL at 08:49

## 2024-10-20 RX ADMIN — IPRATROPIUM BROMIDE AND ALBUTEROL SULFATE 3 ML: .5; 3 SOLUTION RESPIRATORY (INHALATION) at 08:06

## 2024-10-20 RX ADMIN — PANTOPRAZOLE SODIUM 40 MG: 40 TABLET, DELAYED RELEASE ORAL at 17:02

## 2024-10-20 RX ADMIN — ZOLPIDEM TARTRATE 2.5 MG: 5 TABLET, FILM COATED ORAL at 21:30

## 2024-10-20 RX ADMIN — GUAIFENESIN 600 MG: 600 TABLET ORAL at 08:47

## 2024-10-20 RX ADMIN — PANTOPRAZOLE SODIUM 40 MG: 40 TABLET, DELAYED RELEASE ORAL at 05:49

## 2024-10-20 RX ADMIN — IPRATROPIUM BROMIDE AND ALBUTEROL SULFATE 3 ML: .5; 3 SOLUTION RESPIRATORY (INHALATION) at 23:57

## 2024-10-20 RX ADMIN — PIPERACILLIN AND TAZOBACTAM 3.38 G: 3; .375 INJECTION, POWDER, FOR SOLUTION INTRAVENOUS at 23:52

## 2024-10-20 RX ADMIN — IPRATROPIUM BROMIDE AND ALBUTEROL SULFATE 3 ML: .5; 3 SOLUTION RESPIRATORY (INHALATION) at 20:10

## 2024-10-20 RX ADMIN — PIPERACILLIN AND TAZOBACTAM 3.38 G: 3; .375 INJECTION, POWDER, FOR SOLUTION INTRAVENOUS at 08:49

## 2024-10-20 RX ADMIN — AMLODIPINE BESYLATE 10 MG: 5 TABLET ORAL at 08:46

## 2024-10-20 RX ADMIN — ALLOPURINOL 300 MG: 300 TABLET ORAL at 21:30

## 2024-10-20 RX ADMIN — ROSUVASTATIN CALCIUM 40 MG: 40 TABLET, FILM COATED ORAL at 08:50

## 2024-10-20 RX ADMIN — PIPERACILLIN AND TAZOBACTAM 3.38 G: 3; .375 INJECTION, POWDER, FOR SOLUTION INTRAVENOUS at 00:53

## 2024-10-20 RX ADMIN — IPRATROPIUM BROMIDE AND ALBUTEROL SULFATE 3 ML: .5; 3 SOLUTION RESPIRATORY (INHALATION) at 12:29

## 2024-10-20 RX ADMIN — ISOSORBIDE MONONITRATE 30 MG: 30 TABLET, EXTENDED RELEASE ORAL at 08:48

## 2024-10-20 RX ADMIN — Medication 25 MCG: at 08:50

## 2024-10-20 RX ADMIN — IPRATROPIUM BROMIDE AND ALBUTEROL SULFATE 3 ML: .5; 3 SOLUTION RESPIRATORY (INHALATION) at 16:15

## 2024-10-20 RX ADMIN — GUAIFENESIN 600 MG: 600 TABLET ORAL at 21:30

## 2024-10-20 ASSESSMENT — ACTIVITIES OF DAILY LIVING (ADL)
ADLS_ACUITY_SCORE: 37
ADLS_ACUITY_SCORE: 39
ADLS_ACUITY_SCORE: 37
ADLS_ACUITY_SCORE: 35
ADLS_ACUITY_SCORE: 34
ADLS_ACUITY_SCORE: 37
ADLS_ACUITY_SCORE: 37
ADLS_ACUITY_SCORE: 39
ADLS_ACUITY_SCORE: 35
ADLS_ACUITY_SCORE: 35
ADLS_ACUITY_SCORE: 37
ADLS_ACUITY_SCORE: 39
ADLS_ACUITY_SCORE: 34
ADLS_ACUITY_SCORE: 39
ADLS_ACUITY_SCORE: 34
ADLS_ACUITY_SCORE: 39
ADLS_ACUITY_SCORE: 39
ADLS_ACUITY_SCORE: 34
ADLS_ACUITY_SCORE: 37
ADLS_ACUITY_SCORE: 39
ADLS_ACUITY_SCORE: 35
ADLS_ACUITY_SCORE: 39
ADLS_ACUITY_SCORE: 35

## 2024-10-20 NOTE — PLAN OF CARE
Problem: Pain Acute  Goal: Optimal Pain Control and Function  Outcome: Progressing     Problem: Comorbidity Management  Goal: Blood Glucose Levels Within Targeted Range  Outcome: Progressing   Goal Outcome Evaluation:  No significant events overnight for Sedrick. He said his chest was feeling much better than the day before. Denied pain. A&OX4, very pleasant. Room air. No telemetry. SBA. PRN neb requested once this shift. He appeared to sleep well.

## 2024-10-20 NOTE — PROGRESS NOTES
[facility]    Medicine Progress Note - Hospitalist Service    Date of Admission:  10/13/2024    Assessment & Plan   74-year-old with history of bronchitis, CAD not on aspirin who presented with shortness of breath and chest pain. Patient was treated with steroids nebs and antibiotics and is improving..  Chest x-ray on 10/17 shows new left-sided infiltrate.  Started on IV Zosyn for hospital-acquired pneumonia.  Creatinine worsened on 10/18 due to IV Lasix and therefore this has been stopped.  S/p IV albumin     Acute respiratory insufficiency , improved   -- Initially treated with IV steroids, nebs and doxycycline but did not improve.    --continue zosyn for HAP    Hospital-acquired pneumonia new on 10/18  -- xray on  10/17 showed left sided pneumonia  -- continue  IV Zosyn.    -- sputum gm stain and culture .      Chest pain likely musculoskeletal   -- continue aspirin 81 mg   -- Continue Imdur  --Patient has had recent angiogram with unchanged findings of 80% stenosis of the distal LAD and massive distal RCA  --On his last discharge, metoprolol was added to his regimen but is unclear why it was discontinued.  -- CT chest negative for PE  -- He is a poor candidate for dual antiplatelet therapy due to previous CNS bleed.    Steroid induced hyperglycemia  -Continue NPH insulin while taking steriod  -Monitor blood sugar     PANKAJ on CKD 3 , improving   --new on 10/18  -- Baseline creatinine of 1.15  --Creatinine worsened to 1.7 on 10/18   --DC IV Lasix and hold Bumex.   - monitor BMP     Moderate aortic regurgitation seen on echo  -- EF of greater than 60% and therefore no urgent valvular placement indicated  -- Patient remains asymptomatic then consider follow-up in valvular clinic  -- Recommend echocardiogram every 6 months.     History of hemorrhagic stroke but tolerates baby aspirin at home     Suspected VICTORINA  --Has had nocturnal hypoxia during his hospital stay on previous admission  --Need  "outpatient follow-up     Sore throat  Continue Cepacol lozenges          Diet: Combination Diet Moderate Consistent Carb (60 g CHO per Meal) Diet; 2 gm NA Diet  Room Service    DVT Prophylaxis: Ambulate every shift  Eugene Catheter: Not present  Lines: None     Cardiac Monitoring: None  Code Status: Full Code      Clinically Significant Risk Factors                   # Hypertension: Noted on problem list          # DMII: A1C = 7.2 % (Ref range: <5.7 %) within past 6 months   # Obesity: Estimated body mass index is 31.19 kg/m  as calculated from the following:    Height as of this encounter: 1.6 m (5' 3\").    Weight as of this encounter: 79.9 kg (176 lb 1.6 oz).      # Financial/Environmental Concerns: none         Disposition Plan     Medically Ready for Discharge: Anticipated Tomorrow         Rod Chanel MD  Hospitalist Service  [facility]  Securely message with [employer] (more info)  Text page via [employer] Paging/Directory   ______________________________________________________________________    Interval History   Johnny states he is feeling better today.  Is having good oral intake.  No sputum has been collected.  Management plan discussed with the patient and his spouse who was present at the bedside. Hmong language translation was used via telephone    Physical Exam   Vital Signs: Temp: 97.9  F (36.6  C) Temp src: Oral BP: 130/70 Pulse: 82   Resp: 20 SpO2: 95 % O2 Device: None (Room air)    Weight: 176 lbs 1.6 oz    General Appearance:  No distress noted  Respiratory: Good air entry bilaterally  Cardiovascular: S1 and S2 well heard  GI: Soft abdomen, no tenderness, normoactive bowel sounds  Skin: Intact and warm        Medical Decision Making       40 MINUTES SPENT BY ME on the date of service doing chart review, history, exam, documentation & further activities per the note.      Data     "

## 2024-10-20 NOTE — PLAN OF CARE
Problem: Adult Inpatient Plan of Care  Goal: Plan of Care Review  Description: The Plan of Care Review/Shift note should be completed every shift.  The Outcome Evaluation is a brief statement about your assessment that the patient is improving, declining, or no change.  This information will be displayed automatically on your shift  note.  Outcome: Progressing   Goal Outcome Evaluation:       Discussed current treatment. Changed to oral steroids but continues on IV antibiotics.  Problem: Risk for Delirium  Goal: Improved Attention and Thought Clarity  Outcome: Progressing     Is very oriented. Visiting with his wife. Speaks and understands a lot of english.  Problem: Skin Injury Risk Increased  Goal: Skin Health and Integrity  Intervention: Plan: Nurse Driven Intervention: Moisture Management  Recent Flowsheet Documentation  Taken 10/20/2024 0806 by Mariya Ware RN  Moisture Interventions:       Indep. With bathroom. Had a bm and refused senna.

## 2024-10-21 ENCOUNTER — APPOINTMENT (OUTPATIENT)
Dept: INTERPRETER SERVICES | Facility: CLINIC | Age: 74
End: 2024-10-21
Payer: COMMERCIAL

## 2024-10-21 VITALS
RESPIRATION RATE: 18 BRPM | HEART RATE: 82 BPM | HEIGHT: 63 IN | DIASTOLIC BLOOD PRESSURE: 64 MMHG | WEIGHT: 176.59 LBS | BODY MASS INDEX: 31.29 KG/M2 | SYSTOLIC BLOOD PRESSURE: 149 MMHG | TEMPERATURE: 97.4 F | OXYGEN SATURATION: 95 %

## 2024-10-21 LAB
ANION GAP SERPL CALCULATED.3IONS-SCNC: 11 MMOL/L (ref 7–15)
BUN SERPL-MCNC: 36.6 MG/DL (ref 8–23)
CALCIUM SERPL-MCNC: 8.6 MG/DL (ref 8.8–10.4)
CHLORIDE SERPL-SCNC: 101 MMOL/L (ref 98–107)
CREAT SERPL-MCNC: 1.22 MG/DL (ref 0.67–1.17)
EGFRCR SERPLBLD CKD-EPI 2021: 63 ML/MIN/1.73M2
ERYTHROCYTE [DISTWIDTH] IN BLOOD BY AUTOMATED COUNT: 13.2 % (ref 10–15)
GLUCOSE BLDC GLUCOMTR-MCNC: 115 MG/DL (ref 70–99)
GLUCOSE BLDC GLUCOMTR-MCNC: 164 MG/DL (ref 70–99)
GLUCOSE BLDC GLUCOMTR-MCNC: 167 MG/DL (ref 70–99)
GLUCOSE SERPL-MCNC: 175 MG/DL (ref 70–99)
HCO3 SERPL-SCNC: 24 MMOL/L (ref 22–29)
HCT VFR BLD AUTO: 44.8 % (ref 40–53)
HGB BLD-MCNC: 14.9 G/DL (ref 13.3–17.7)
MCH RBC QN AUTO: 30.3 PG (ref 26.5–33)
MCHC RBC AUTO-ENTMCNC: 33.3 G/DL (ref 31.5–36.5)
MCV RBC AUTO: 91 FL (ref 78–100)
PLATELET # BLD AUTO: 187 10E3/UL (ref 150–450)
POTASSIUM SERPL-SCNC: 4.2 MMOL/L (ref 3.4–5.3)
RBC # BLD AUTO: 4.91 10E6/UL (ref 4.4–5.9)
SODIUM SERPL-SCNC: 136 MMOL/L (ref 135–145)
WBC # BLD AUTO: 11.8 10E3/UL (ref 4–11)

## 2024-10-21 PROCEDURE — 250N000013 HC RX MED GY IP 250 OP 250 PS 637: Performed by: STUDENT IN AN ORGANIZED HEALTH CARE EDUCATION/TRAINING PROGRAM

## 2024-10-21 PROCEDURE — 250N000009 HC RX 250: Performed by: EMERGENCY MEDICINE

## 2024-10-21 PROCEDURE — 250N000013 HC RX MED GY IP 250 OP 250 PS 637: Performed by: FAMILY MEDICINE

## 2024-10-21 PROCEDURE — 36415 COLL VENOUS BLD VENIPUNCTURE: CPT | Performed by: STUDENT IN AN ORGANIZED HEALTH CARE EDUCATION/TRAINING PROGRAM

## 2024-10-21 PROCEDURE — 94640 AIRWAY INHALATION TREATMENT: CPT

## 2024-10-21 PROCEDURE — 85027 COMPLETE CBC AUTOMATED: CPT | Performed by: STUDENT IN AN ORGANIZED HEALTH CARE EDUCATION/TRAINING PROGRAM

## 2024-10-21 PROCEDURE — 250N000011 HC RX IP 250 OP 636: Performed by: INTERNAL MEDICINE

## 2024-10-21 PROCEDURE — 94640 AIRWAY INHALATION TREATMENT: CPT | Mod: 76

## 2024-10-21 PROCEDURE — 250N000012 HC RX MED GY IP 250 OP 636 PS 637: Performed by: STUDENT IN AN ORGANIZED HEALTH CARE EDUCATION/TRAINING PROGRAM

## 2024-10-21 PROCEDURE — 80048 BASIC METABOLIC PNL TOTAL CA: CPT | Performed by: STUDENT IN AN ORGANIZED HEALTH CARE EDUCATION/TRAINING PROGRAM

## 2024-10-21 PROCEDURE — 250N000013 HC RX MED GY IP 250 OP 250 PS 637: Performed by: INTERNAL MEDICINE

## 2024-10-21 PROCEDURE — 99239 HOSP IP/OBS DSCHRG MGMT >30: CPT | Performed by: STUDENT IN AN ORGANIZED HEALTH CARE EDUCATION/TRAINING PROGRAM

## 2024-10-21 RX ORDER — ISOSORBIDE MONONITRATE 30 MG/1
30 TABLET, EXTENDED RELEASE ORAL DAILY
Qty: 30 TABLET | Refills: 1 | Status: SHIPPED | OUTPATIENT
Start: 2024-10-22 | End: 2024-12-21

## 2024-10-21 RX ORDER — BUMETANIDE 1 MG/1
1 TABLET ORAL DAILY
Qty: 30 TABLET | Refills: 1 | Status: SHIPPED | OUTPATIENT
Start: 2024-10-28

## 2024-10-21 RX ORDER — ASPIRIN 81 MG/1
81 TABLET ORAL DAILY
Qty: 30 TABLET | Refills: 1 | Status: SHIPPED | OUTPATIENT
Start: 2024-10-22 | End: 2024-12-21

## 2024-10-21 RX ORDER — SPIRONOLACTONE 25 MG/1
25 TABLET ORAL DAILY
Qty: 30 TABLET | Refills: 1 | Status: SHIPPED | OUTPATIENT
Start: 2024-10-28

## 2024-10-21 RX ORDER — PREDNISONE 10 MG/1
TABLET ORAL
Qty: 13 TABLET | Refills: 0 | Status: SHIPPED | OUTPATIENT
Start: 2024-10-22 | End: 2024-10-29

## 2024-10-21 RX ADMIN — PREDNISONE 40 MG: 20 TABLET ORAL at 09:39

## 2024-10-21 RX ADMIN — ALLOPURINOL 300 MG: 300 TABLET ORAL at 09:40

## 2024-10-21 RX ADMIN — IPRATROPIUM BROMIDE AND ALBUTEROL SULFATE 3 ML: .5; 3 SOLUTION RESPIRATORY (INHALATION) at 11:44

## 2024-10-21 RX ADMIN — ASPIRIN 81 MG: 81 TABLET, COATED ORAL at 09:40

## 2024-10-21 RX ADMIN — GUAIFENESIN 600 MG: 600 TABLET ORAL at 09:40

## 2024-10-21 RX ADMIN — Medication 25 MCG: at 09:40

## 2024-10-21 RX ADMIN — PIPERACILLIN AND TAZOBACTAM 3.38 G: 3; .375 INJECTION, POWDER, FOR SOLUTION INTRAVENOUS at 09:32

## 2024-10-21 RX ADMIN — ACETAMINOPHEN 650 MG: 325 TABLET ORAL at 09:40

## 2024-10-21 RX ADMIN — ROSUVASTATIN CALCIUM 40 MG: 40 TABLET, FILM COATED ORAL at 09:41

## 2024-10-21 RX ADMIN — METFORMIN HYDROCHLORIDE 500 MG: 500 TABLET, FILM COATED ORAL at 12:09

## 2024-10-21 RX ADMIN — IPRATROPIUM BROMIDE AND ALBUTEROL SULFATE 3 ML: .5; 3 SOLUTION RESPIRATORY (INHALATION) at 08:58

## 2024-10-21 RX ADMIN — AMLODIPINE BESYLATE 10 MG: 5 TABLET ORAL at 09:40

## 2024-10-21 RX ADMIN — PANTOPRAZOLE SODIUM 40 MG: 40 TABLET, DELAYED RELEASE ORAL at 06:34

## 2024-10-21 RX ADMIN — ISOSORBIDE MONONITRATE 30 MG: 30 TABLET, EXTENDED RELEASE ORAL at 09:40

## 2024-10-21 RX ADMIN — LOSARTAN POTASSIUM 100 MG: 50 TABLET, FILM COATED ORAL at 09:40

## 2024-10-21 RX ADMIN — AMOXICILLIN AND CLAVULANATE POTASSIUM 1 TABLET: 875; 125 TABLET, FILM COATED ORAL at 12:09

## 2024-10-21 ASSESSMENT — ACTIVITIES OF DAILY LIVING (ADL)
ADLS_ACUITY_SCORE: 37

## 2024-10-21 NOTE — PROGRESS NOTES
Care Management Discharge Note    Discharge Date: 10/21/2024       Discharge Disposition: Home    Discharge Services: PCA    Discharge DME: None    Discharge Transportation:      Private pay costs discussed: Not applicable    Does the patient's insurance plan have a 3 day qualifying hospital stay waiver?  No    PAS Confirmation Code:  NA  Patient/family educated on Medicare website which has current facility and service quality ratings: yes    Education Provided on the Discharge Plan: Yes  Persons Notified of Discharge Plans: patient   Patient/Family in Agreement with the Plan: yes    Handoff Referral Completed: No, handoff not indicated or clinically appropriate    Additional Information:  Discussed updates with MD. Anticipate discharge home with resumption of PCA services and support from family. Family to transport.     PARRIS Jha at 9:19 AM on 10/21/24     11:51 AM  Left voice mail for pt's daughter, Ama, inquiring what time family will transport.

## 2024-10-21 NOTE — PROGRESS NOTES
RCAT Treatment Plan    Patient Score: 13  Patient Acuity: 4    Clinical Indication for Therapy: Bronchospasm    Therapy Ordered: Duo QID    Assessment Summary: Continue current therapy per RCAT. RE RCAT in 3 days.     Rodolfo Costa RT  10/20/2024

## 2024-10-21 NOTE — PLAN OF CARE
Problem: Comorbidity Management  Goal: Blood Glucose Levels Within Targeted Range  Outcome: Progressing     Problem: Pain Acute  Goal: Optimal Pain Control and Function  Outcome: Adequate for Care Transition   Goal Outcome Evaluation:  Sedrick had a good night, appeared to sleep well. A&OX4, very pleasant. Room air, PRN neb given x1. No telemetry. Denies pain.

## 2024-10-21 NOTE — PLAN OF CARE
Problem: Glycemic Control Impaired  Goal: Blood Glucose Level Within Targeted Range  Outcome: Progressing     Problem: Gas Exchange Impaired  Goal: Optimal Gas Exchange  Outcome: Progressing     Goal Outcome Evaluation:    Hmong speaking. RA. LS diminished. No wheezing. Denies pain. Ambulated x2 with spouse in the hallway. IV ABX infused.  and 310. Educated spouse and pt on foods with high carbs to reduce. Pt stated DM2 is a new dx for 5 months, not manage with anything at home prior to admission.  Diabetic booklet handout given. Bowels and Voids WNL. Call light and urinal within reach.  Potential discharge tomorrow.

## 2024-10-21 NOTE — PLAN OF CARE
"Goal Outcome Evaluation:    Problem: Adult Inpatient Plan of Care  Goal: Plan of Care Review  Description: The Plan of Care Review/Shift note should be completed every shift.  The Outcome Evaluation is a brief statement about your assessment that the patient is improving, declining, or no change.  This information will be displayed automatically on your shift  note.  Outcome: Met  Goal: Patient-Specific Goal (Individualized)  Description: You can add care plan individualizations to a care plan. Examples of Individualization might be:  \"Parent requests to be called daily at 9am for status\", \"I have a hard time hearing out of my right ear\", or \"Do not touch me to wake me up as it startles  me\".  Outcome: Met  Goal: Absence of Hospital-Acquired Illness or Injury  Outcome: Met  Intervention: Identify and Manage Fall Risk  Recent Flowsheet Documentation  Taken 10/21/2024 0754 by Kristin Barton, RN  Safety Promotion/Fall Prevention:   clutter free environment maintained   nonskid shoes/slippers when out of bed   patient and family education   room organization consistent   safety round/check completed  Goal: Readiness for Transition of Care  Outcome: Met     Problem: Risk for Delirium  Goal: Optimal Coping  Outcome: Met  Goal: Improved Behavioral Control  Outcome: Met  Intervention: Minimize Safety Risk  Recent Flowsheet Documentation  Taken 10/21/2024 0754 by Kristin Barton RN  Enhanced Safety Measures: review medications for side effects with activity  Goal: Improved Attention and Thought Clarity  Outcome: Met     Problem: Skin Injury Risk Increased  Goal: Skin Health and Integrity  Outcome: Met  Intervention: Plan: Nurse Driven Intervention: Moisture Management  Recent Flowsheet Documentation  Taken 10/21/2024 0754 by Kristin Barton, RN  Moisture Interventions: Encourage regular toileting  Intervention: Plan: Nurse Driven Intervention: Friction and Shear  Recent Flowsheet Documentation  Taken 10/21/2024 0754 by Mick" Kristin ALANIZ RN  Friction/Shear Interventions: HOB 30 degrees or less  Intervention: Optimize Skin Protection  Recent Flowsheet Documentation  Taken 10/21/2024 0937 by Kristin Barton, RN  Activity Management: ambulated to bathroom     Problem: Chest Pain  Goal: Resolution of Chest Pain Symptoms  Outcome: Met     Problem: Hypertension Acute  Goal: Blood Pressure Within Desired Range  Outcome: Met  Intervention: Normalize Blood Pressure  Recent Flowsheet Documentation  Taken 10/21/2024 0754 by Kristin Barton, RN  Medication Review/Management: medications reviewed     Problem: Glycemic Control Impaired  Goal: Blood Glucose Level Within Targeted Range  Outcome: Met     Problem: Gas Exchange Impaired  Goal: Optimal Gas Exchange  Outcome: Met     Problem: Comorbidity Management  Goal: Blood Glucose Levels Within Targeted Range  Outcome: Met  Intervention: Monitor and Manage Glycemia  Recent Flowsheet Documentation  Taken 10/21/2024 0754 by Kristin Barton, RN  Medication Review/Management: medications reviewed        Patient denied pain prior to discharge. Continues to tolerate room air with oxygen saturation staying >90%. Discharge instructions discussed with patient and spouse; both verbalized understanding. Copy of AVS handed to patient. Patient left with all personal belongings including cellphones and chargers.

## 2024-10-21 NOTE — DISCHARGE SUMMARY
"[facility]  Hospitalist Discharge Summary      Date of Admission:  10/13/2024  Date of Discharge:  10/21/2024  1:25 PM  Discharging Provider: Rod Chanel MD  Discharge Service: Hospitalist Service    Discharge Diagnoses   Acute respiratory insufficiency, improved  Hospital-acquired pneumonia, on 10/18  Chest pain likely musculoskeletal  Steroid-induced hyperglycemia  Type 2 diabetes mellitus  PANKAJ on CKD 3, improving  Moderate aortic regurgitation  History of hemorrhagic stroke  Suspected VICTORINA  Sore throat    Clinically Significant Risk Factors     # DMII: A1C = 7.2 % (Ref range: <5.7 %) within past 6 months  # Obesity: Estimated body mass index is 31.28 kg/m  as calculated from the following:    Height as of this encounter: 1.6 m (5' 3\").    Weight as of this encounter: 80.1 kg (176 lb 9.4 oz).       Follow-ups Needed After Discharge   Follow-up Appointments     Follow-up and recommended labs and tests       Follow up with primary care provider, SHASHANK DOUGLAS, within 7 days for   hospital follow- up.  The following labs/tests are recommended: BMP in a   week.          Unresulted Labs Ordered in the Past 30 Days of this Admission       No orders found from 9/13/2024 to 10/14/2024.        These results will be followed up by     Discharge Disposition   Discharged to home  Condition at discharge: Stable    Hospital Course   74-year-old with history of bronchitis, CAD not on aspirin who presented with shortness of breath and chest pain. Patient was treated with steroids nebs and antibiotics and is improving..  Chest x-ray on 10/17 shows new left-sided infiltrate.  Started on IV Zosyn for hospital-acquired pneumonia.  Creatinine worsened on 10/18 due to IV Lasix and therefore this has been stopped.  S/p IV albumin   Acute respiratory insufficiency , improved   -- Initially treated with IV steroids, nebs and doxycycline but did not improve.    --was on  zosyn for HAP. Later on transitioned " to augmentin  Hospital-acquired pneumonia new on 10/18  -- xray on  10/17 showed left sided pneumonia  -- continue  IV Zosyn.    -- sputum gm stain and culture . No sputum was obtained    Chest pain likely musculoskeletal   -- continue aspirin 81 mg   -- Continue Imdur  --Patient has had recent angiogram with unchanged findings of 80% stenosis of the distal LAD and massive distal RCA  --On his last discharge, metoprolol was added to his regimen but is unclear why it was discontinued.  -- CT chest negative for PE  -- He is a poor candidate for dual antiplatelet therapy due to previous CNS bleed.  Steroid induced hyperglycemia  Type 2 DM   -Continue NPH insulin while taking steriod  -Start metformin 500 mg PO twice daily  -Monitor blood sugar   PANKAJ on CKD 3 , improving   - improved after IV diuretic was held   Moderate aortic regurgitation seen on echo  -- EF of greater than 60% and therefore no urgent valvular placement indicated  -- Patient remains asymptomatic then consider follow-up in valvular clinic  -- Recommend echocardiogram every 6 months.   History of hemorrhagic stroke but tolerates baby aspirin at home   Suspected VICTORINA  --Has had nocturnal hypoxia during his hospital stay on previous admission  --Need outpatient follow-up   Sore throat  Continue Cepacol lozenges    Patient is clinically stable enough to discharge home.  Medication reconciliation was done.    Consultations This Hospital Stay   CARDIOLOGY IP CONSULT  CARE MANAGEMENT / SOCIAL WORK IP CONSULT    Code Status   Full Code    Time Spent on this Encounter   I, Rod Chanel MD, personally saw the patient today and spent greater than 30 minutes discharging this patient.       Rod Chanel MD  [address] [address]  Phone: [phone]  Fax: [phone]  ______________________________________________________________________    Physical Exam   Vital Signs: Temp: 97.4  F  (36.3  C) Temp src: Oral BP: (!) 149/64 Pulse: 82   Resp: 18 SpO2: 95 % O2 Device: None (Room air)    Weight: 176 lbs 9.42 oz    General Appearance:  No distress noted  Respiratory: Good air entry bilaterally  Cardiovascular: S1 and S2 well heard  GI: Soft abdomen, no tenderness, normoactive bowel sounds  Skin: Intact and warm           Primary Care Physician   SHASHANK DOUGLAS    Discharge Orders      Reason for your hospital stay    Acute respiratory failure     Follow-up and recommended labs and tests     Follow up with primary care provider, SHASHANK DOUGLAS, within 7 days for hospital follow- up.  The following labs/tests are recommended: BMP in a week.     Activity    Your activity upon discharge: activity as tolerated     Diet    Follow this diet upon discharge: Current Diet:Orders Placed This Encounter      Room Service      Combination Diet Moderate Consistent Carb (60 g CHO per Meal) Diet; 2 gm NA Diet       Significant Results and Procedures       Discharge Medications   Current Discharge Medication List        START taking these medications    Details   amoxicillin-clavulanate (AUGMENTIN) 875-125 MG tablet Take 1 tablet by mouth every 12 hours for 6 days.  Qty: 12 tablet, Refills: 0    Associated Diagnoses: HAP (hospital-acquired pneumonia)      aspirin 81 MG EC tablet Take 1 tablet (81 mg) by mouth daily.  Qty: 30 tablet, Refills: 1    Associated Diagnoses: Coronary artery disease due to lipid rich plaque      blood glucose (NO BRAND SPECIFIED) lancets standard Use to test blood sugar 4 times daily or as directed.  Qty: 100 Lancet, Refills: 0    Associated Diagnoses: Type 2 diabetes mellitus with other circulatory complication, without long-term current use of insulin (H)      blood glucose monitoring (NO BRAND SPECIFIED) meter device kit Use to test blood sugar 4 times daily or as directed.  Qty: 1 kit, Refills: 0    Associated Diagnoses: Type 2 diabetes mellitus with other circulatory complication, without  long-term current use of insulin (H)      isosorbide mononitrate (IMDUR) 30 MG 24 hr tablet Take 1 tablet (30 mg) by mouth daily.  Qty: 30 tablet, Refills: 1    Associated Diagnoses: Chronic diastolic heart failure (H)      metFORMIN (GLUCOPHAGE) 500 MG tablet Take 1 tablet (500 mg) by mouth 2 times daily (with meals).  Qty: 60 tablet, Refills: 1    Associated Diagnoses: Type 2 diabetes mellitus with other circulatory complication, without long-term current use of insulin (H)      predniSONE (DELTASONE) 10 MG tablet Take 4 tablets (40 mg) by mouth daily for 1 day, THEN 2 tablets (20 mg) daily for 3 days, THEN 1 tablet (10 mg) daily for 3 days.  Qty: 13 tablet, Refills: 0    Associated Diagnoses: HAP (hospital-acquired pneumonia)           CONTINUE these medications which have CHANGED    Details   bumetanide (BUMEX) 1 MG tablet Take 1 tablet (1 mg) by mouth daily.  Qty: 30 tablet, Refills: 1    Associated Diagnoses: Acute diastolic heart failure (H)      spironolactone (ALDACTONE) 25 MG tablet Take 1 tablet (25 mg) by mouth daily.  Qty: 30 tablet, Refills: 1    Associated Diagnoses: Acute diastolic heart failure (H)           CONTINUE these medications which have NOT CHANGED    Details   allopurinol (ZYLOPRIM) 300 MG tablet Take 300 mg by mouth 2 times daily      amLODIPine (NORVASC) 10 MG tablet Take 10 mg by mouth daily      losartan (COZAAR) 100 MG tablet Take 100 mg by mouth daily.      nitroGLYcerin (NITROSTAT) 0.4 MG sublingual tablet For chest pain place 1 tablet under the tongue every 5 minutes for 3 doses. If symptoms persist 5 minutes after 1st dose call 911.  Qty: 20 tablet, Refills: 3    Associated Diagnoses: Chest pain, unspecified type; Coronary artery disease due to lipid rich plaque      pantoprazole (PROTONIX) 40 MG EC tablet Take 1 tablet (40 mg) by mouth 2 times daily (before meals)  Qty: 60 tablet, Refills: 3    Associated Diagnoses: Acute chest pain      rosuvastatin (CRESTOR) 40 MG tablet Take  40 mg by mouth daily      VITAMIN D3 25 MCG (1000 UT) tablet Take 25 mcg by mouth daily.           Allergies   Allergies   Allergen Reactions    Dilaudid [Hydromorphone] Unknown    Phenytoin Hives and Itching

## 2024-10-23 ENCOUNTER — PATIENT OUTREACH (OUTPATIENT)
Dept: CARE COORDINATION | Facility: CLINIC | Age: 74
End: 2024-10-23
Payer: COMMERCIAL

## 2024-10-23 NOTE — PROGRESS NOTES
[facility]:   [facility] Contact  [facility]/Voicemail     Clinical Data: Post-Discharge Outreach     Outreach attempted x 2.  Left message on patient's voicemail, providing [facility]'s central phone number of [phone] ([phone]) for questions/concerns and/or to schedule an appt with an [facility] provider, if they do not have a PCP.      Plan:  [facility] will do no further outreaches at this time.       Naye English  Community Health Worker  [facility], [facility]  [phone]      *Connected Care Resource Team does NOT follow patient ongoing. Referrals are identified based on internal discharge reports and the outreach is to ensure patient has an understanding of their discharge instructions.

## 2024-11-03 ENCOUNTER — HOSPITAL ENCOUNTER (EMERGENCY)
Facility: CLINIC | Age: 74
Discharge: HOME | End: 2024-11-03
Attending: EMERGENCY MEDICINE | Admitting: EMERGENCY MEDICINE
Payer: COMMERCIAL

## 2024-11-03 ENCOUNTER — APPOINTMENT (OUTPATIENT)
Dept: CT IMAGING | Facility: CLINIC | Age: 74
End: 2024-11-03
Attending: EMERGENCY MEDICINE
Payer: COMMERCIAL

## 2024-11-03 VITALS
DIASTOLIC BLOOD PRESSURE: 72 MMHG | OXYGEN SATURATION: 95 % | WEIGHT: 179 LBS | SYSTOLIC BLOOD PRESSURE: 172 MMHG | HEIGHT: 63 IN | BODY MASS INDEX: 31.71 KG/M2 | TEMPERATURE: 97.7 F | RESPIRATION RATE: 28 BRPM | HEART RATE: 61 BPM

## 2024-11-03 DIAGNOSIS — E87.6 HYPOKALEMIA: ICD-10-CM

## 2024-11-03 DIAGNOSIS — S05.11XA PERIORBITAL CONTUSION OF RIGHT EYE, INITIAL ENCOUNTER: ICD-10-CM

## 2024-11-03 DIAGNOSIS — R55 NEAR SYNCOPE: ICD-10-CM

## 2024-11-03 LAB
ALBUMIN SERPL BCG-MCNC: 3.4 G/DL (ref 3.5–5.2)
ALP SERPL-CCNC: 72 U/L (ref 40–150)
ALT SERPL W P-5'-P-CCNC: 39 U/L (ref 0–70)
ANION GAP SERPL CALCULATED.3IONS-SCNC: 10 MMOL/L (ref 7–15)
AST SERPL W P-5'-P-CCNC: 34 U/L (ref 0–45)
ATRIAL RATE - MUSE: 66 BPM
BASOPHILS # BLD AUTO: 0 10E3/UL (ref 0–0.2)
BASOPHILS NFR BLD AUTO: 0 %
BILIRUB DIRECT SERPL-MCNC: <0.2 MG/DL (ref 0–0.3)
BILIRUB SERPL-MCNC: 0.4 MG/DL
BUN SERPL-MCNC: 28 MG/DL (ref 8–23)
CALCIUM SERPL-MCNC: 8.8 MG/DL (ref 8.8–10.4)
CHLORIDE SERPL-SCNC: 107 MMOL/L (ref 98–107)
CREAT SERPL-MCNC: 1.02 MG/DL (ref 0.67–1.17)
D DIMER PPP FEU-MCNC: 1.34 UG/ML FEU (ref 0–0.5)
DIASTOLIC BLOOD PRESSURE - MUSE: NORMAL MMHG
EGFRCR SERPLBLD CKD-EPI 2021: 78 ML/MIN/1.73M2
EOSINOPHIL # BLD AUTO: 0.2 10E3/UL (ref 0–0.7)
EOSINOPHIL NFR BLD AUTO: 3 %
ERYTHROCYTE [DISTWIDTH] IN BLOOD BY AUTOMATED COUNT: 14.2 % (ref 10–15)
GLUCOSE SERPL-MCNC: 141 MG/DL (ref 70–99)
HCO3 SERPL-SCNC: 25 MMOL/L (ref 22–29)
HCT VFR BLD AUTO: 40.3 % (ref 40–53)
HGB BLD-MCNC: 13.3 G/DL (ref 13.3–17.7)
HOLD SPECIMEN: NORMAL
IMM GRANULOCYTES # BLD: 0 10E3/UL
IMM GRANULOCYTES NFR BLD: 1 %
INTERPRETATION ECG - MUSE: NORMAL
LYMPHOCYTES # BLD AUTO: 1.1 10E3/UL (ref 0.8–5.3)
LYMPHOCYTES NFR BLD AUTO: 17 %
MCH RBC QN AUTO: 30.4 PG (ref 26.5–33)
MCHC RBC AUTO-ENTMCNC: 33 G/DL (ref 31.5–36.5)
MCV RBC AUTO: 92 FL (ref 78–100)
MONOCYTES # BLD AUTO: 0.6 10E3/UL (ref 0–1.3)
MONOCYTES NFR BLD AUTO: 9 %
NEUTROPHILS # BLD AUTO: 4.5 10E3/UL (ref 1.6–8.3)
NEUTROPHILS NFR BLD AUTO: 71 %
NRBC # BLD AUTO: 0 10E3/UL
NRBC BLD AUTO-RTO: 0 /100
P AXIS - MUSE: 13 DEGREES
PLATELET # BLD AUTO: 132 10E3/UL (ref 150–450)
POTASSIUM SERPL-SCNC: 3.2 MMOL/L (ref 3.4–5.3)
PR INTERVAL - MUSE: 178 MS
PROT SERPL-MCNC: 5.5 G/DL (ref 6.4–8.3)
QRS DURATION - MUSE: 104 MS
QT - MUSE: 412 MS
QTC - MUSE: 431 MS
R AXIS - MUSE: -5 DEGREES
RBC # BLD AUTO: 4.37 10E6/UL (ref 4.4–5.9)
SODIUM SERPL-SCNC: 142 MMOL/L (ref 135–145)
SYSTOLIC BLOOD PRESSURE - MUSE: NORMAL MMHG
T AXIS - MUSE: 44 DEGREES
TROPONIN T SERPL HS-MCNC: 37 NG/L
TROPONIN T SERPL HS-MCNC: 37 NG/L
VENTRICULAR RATE- MUSE: 66 BPM
WBC # BLD AUTO: 6.4 10E3/UL (ref 4–11)

## 2024-11-03 PROCEDURE — 85025 COMPLETE CBC W/AUTO DIFF WBC: CPT | Performed by: EMERGENCY MEDICINE

## 2024-11-03 PROCEDURE — 36415 COLL VENOUS BLD VENIPUNCTURE: CPT | Performed by: EMERGENCY MEDICINE

## 2024-11-03 PROCEDURE — 99285 EMERGENCY DEPT VISIT HI MDM: CPT | Mod: 25

## 2024-11-03 PROCEDURE — 70450 CT HEAD/BRAIN W/O DYE: CPT

## 2024-11-03 PROCEDURE — 85379 FIBRIN DEGRADATION QUANT: CPT | Performed by: EMERGENCY MEDICINE

## 2024-11-03 PROCEDURE — 80051 ELECTROLYTE PANEL: CPT | Performed by: EMERGENCY MEDICINE

## 2024-11-03 PROCEDURE — 71275 CT ANGIOGRAPHY CHEST: CPT

## 2024-11-03 PROCEDURE — 93005 ELECTROCARDIOGRAM TRACING: CPT | Performed by: EMERGENCY MEDICINE

## 2024-11-03 PROCEDURE — 250N000013 HC RX MED GY IP 250 OP 250 PS 637: Performed by: EMERGENCY MEDICINE

## 2024-11-03 PROCEDURE — 250N000011 HC RX IP 250 OP 636: Performed by: EMERGENCY MEDICINE

## 2024-11-03 PROCEDURE — 82248 BILIRUBIN DIRECT: CPT | Performed by: EMERGENCY MEDICINE

## 2024-11-03 PROCEDURE — 84484 ASSAY OF TROPONIN QUANT: CPT | Performed by: EMERGENCY MEDICINE

## 2024-11-03 RX ORDER — POTASSIUM CHLORIDE 1500 MG/1
40 TABLET, EXTENDED RELEASE ORAL ONCE
Status: COMPLETED | OUTPATIENT
Start: 2024-11-03 | End: 2024-11-03

## 2024-11-03 RX ORDER — ACETAMINOPHEN 325 MG/1
975 TABLET ORAL ONCE
Status: COMPLETED | OUTPATIENT
Start: 2024-11-03 | End: 2024-11-03

## 2024-11-03 RX ORDER — IOPAMIDOL 755 MG/ML
75 INJECTION, SOLUTION INTRAVASCULAR ONCE
Status: COMPLETED | OUTPATIENT
Start: 2024-11-03 | End: 2024-11-03

## 2024-11-03 RX ADMIN — IOPAMIDOL 75 ML: 755 INJECTION, SOLUTION INTRAVENOUS at 21:44

## 2024-11-03 RX ADMIN — ACETAMINOPHEN 975 MG: 325 TABLET ORAL at 23:08

## 2024-11-03 RX ADMIN — POTASSIUM CHLORIDE 40 MEQ: 1500 TABLET, EXTENDED RELEASE ORAL at 23:08

## 2024-11-03 ASSESSMENT — COLUMBIA-SUICIDE SEVERITY RATING SCALE - C-SSRS
6. HAVE YOU EVER DONE ANYTHING, STARTED TO DO ANYTHING, OR PREPARED TO DO ANYTHING TO END YOUR LIFE?: NO
1. IN THE PAST MONTH, HAVE YOU WISHED YOU WERE DEAD OR WISHED YOU COULD GO TO SLEEP AND NOT WAKE UP?: NO
2. HAVE YOU ACTUALLY HAD ANY THOUGHTS OF KILLING YOURSELF IN THE PAST MONTH?: NO

## 2024-11-03 ASSESSMENT — ACTIVITIES OF DAILY LIVING (ADL)
ADLS_ACUITY_SCORE: 0

## 2024-11-04 NOTE — ED NOTES
Pt face flushed and skin feels warm to the touch; temporal in triage was higher than oral temp right now which is normal. MD updated and will continue to monitor.

## 2024-11-04 NOTE — ED PROVIDER NOTES
EMERGENCY DEPARTMENT ENCOUNTER      NAME: Sedrick Murray  AGE: 74 year old male  YOB: 1950  MRN: [id number]  EVALUATION DATE & TIME: 11/3/2024  8:25 PM    PCP: Milvia Costa    ED PROVIDER: Elva Juarez MD    Chief Complaint   Patient presents with    Fall    Syncope         FINAL IMPRESSION:  1. Near syncope    2. Hypokalemia    3. Periorbital contusion of right eye, initial encounter          ED COURSE & MEDICAL DECISION MAKING:    Pertinent Labs & Imaging studies reviewed. (See chart for details)  74 year old male with history of HTN, HLD, previous CVA, CAD, DM recently hospitalized for pneumonia who presents to the Emergency Department for evaluation of near syncopal episode after he was seated on the couch for some time and stood up felt lightheaded dizzy and fell to the floor hitting his head on a coffee table.  Differential includes orthostasis, dehydration, electrolyte abnormality, symptomatic anemia, arrhythmia.  Patient has known CAD but overall my suspicion for ACS is low as he complains of this chronic chest pain that is been ongoing, it is not new worse or different and his pain is very much reproducible with palpation to the chest wall on examination today.  He does not have any PE risk factors but was just hospitalized from the 13th to the 21st.  Concern for closed head injury or ICH with his head trauma.    Patient placed on monitor, IV established and blood obtained.  Twelve-lead EKG sinus rhythm.  T wave flattening inferiorly.  , QTc 431.  Compared to previous EKG from 10/17/2024 tachycardia has resolved, PVCs that she is resolved.  CBC, BMP notable for blood glucose of 141.  Known diabetic.  LFTs unremarkable.  Troponin elevated in indeterminate range at 37, this is similar to previous values.  Potassium 3.2 replaced orally.  D-dimer elevated at 1.34.  CT scan of the head unremarkable.  CT PE study unremarkable.  Delta troponin unchanged.  Patient ambulatory here without  symptoms and is safe for discharge to home.        ED Course as of 11/03/24 2316   Sun Nov 03, 2024 2112 D-Dimer Quantitative(!): 1.34   2117 Troponin T, High Sensitivity(!): 37  Similar to prev    2126 Potassium(!): 3.2   2126 Glucose(!): 141  Known DM   2202 CT Chest Pulmonary Embolism w Contrast  CT independently interpreted by myself the visualized PE   2252 Ambulated without difficulty, no symptomatic.       Medical Decision Making  Obtained supplemental history:Supplemental history obtained?: Family Member/Significant Other  Reviewed external records: External records reviewed?: Inpatient Record: Recent hospital discharge summary  Care impacted by chronic illness:Cerebrovascular Disease, Diabetes, Heart Disease, Hyperlipidemia, and Hypertension  Did you consider but not order tests?: Work up considered but not performed and documented in chart, if applicable  Did you interpret images independently?: Independent interpretation of ECG and images noted in documentation, when applicable.  Consultation discussion with other provider:Did you involve another provider (consultant, MH, pharmacy, etc.)?: No  Discharge. No recommendations on prescription strength medication(s). I considered admission, but ultimately discharged patient after serial cardiac enzymes.    MIPS: Adult Minor Head Trauma:Age 65 years or older      At the conclusion of the encounter I discussed the results of all of the tests and the disposition. The questions were answered. The patient or family acknowledged understanding and was agreeable with the care plan.      MEDICATIONS GIVEN IN THE EMERGENCY:  Medications   iopamidol (ISOVUE-370) solution 75 mL (75 mLs Intravenous $Given 11/3/24 2144)   potassium chloride garry ER (KLOR-CON M20) CR tablet 40 mEq (40 mEq Oral $Given 11/3/24 2308)   acetaminophen (TYLENOL) tablet 975 mg (975 mg Oral $Given 11/3/24 2308)       NEW PRESCRIPTIONS STARTED AT TODAY'S ER VISIT  New Prescriptions    No  medications on file          =================================================================    HPI    Patient information was obtained from: Patient and family    Use of Intrepreter: Daughter at bedside, declines formal     Sedrick Murray is a 74 year old male with pertinent medical history of HTN, HLD, previous CVA, CAD, DM recently hospitalized for pneumonia and respiratory failure who presents near-syncopal episode.  Patient was reportedly sitting on the couch in his living room.  Had been sitting there for some time.  Went to stand up and felt lightheaded/dizzy.  States he fell to the floor hitting the right side of his head of the coffee table.  Witnessed by wife.  She does not think he completely lost consciousness.  He was awake and talking the whole time.  Complains of pain to the right side of his head, around the periorbital region with some swelling.  Denies any blood thinning medications.  Patient states that since his recent hospital stay he has been having ongoing issues with shortness of breath and chest pain.      PAST MEDICAL HISTORY:  Past Medical History:   Diagnosis Date    Anxiety     Back pain     Cerebral vascular accident (H)     Chest pain     Chronic pain     Diarrhea     Dyslipidemia 9/1/2014    Gout     Hemorrhagic stroke (H)     Hypertension        PAST SURGICAL HISTORY:  Past Surgical History:   Procedure Laterality Date    CHOLECYSTECTOMY      CV CORONARY ANGIOGRAM N/A 4/12/2024    Procedure: Coronary Angiogram;  Surgeon: Phu Delgado MD;  Location: [facility] CV    CV LEFT HEART CATH N/A 4/12/2024    Procedure: Left Heart Catheterization;  Surgeon: Phu Delgado MD;  Location: [facility] CV    IR MISCELLANEOUS PROCEDURE  3/9/2013    IR MISCELLANEOUS PROCEDURE  3/9/2013       CURRENT MEDICATIONS:    Prior to Admission Medications   Prescriptions Last Dose Informant Patient Reported? Taking?   VITAMIN D3 25 MCG (1000 UT) tablet  Self Yes No   Sig: Take  25 mcg by mouth daily.   allopurinol (ZYLOPRIM) 300 MG tablet  Self, Spouse/Significant Other Yes No   Sig: Take 300 mg by mouth 2 times daily   amLODIPine (NORVASC) 10 MG tablet  Self, Spouse/Significant Other Yes No   Sig: Take 10 mg by mouth daily   aspirin 81 MG EC tablet   No No   Sig: Take 1 tablet (81 mg) by mouth daily.   blood glucose (NO BRAND SPECIFIED) lancets standard   No No   Sig: Use to test blood sugar 4 times daily or as directed.   blood glucose monitoring (NO BRAND SPECIFIED) meter device kit   No No   Sig: Use to test blood sugar 4 times daily or as directed.   bumetanide (BUMEX) 1 MG tablet   No No   Sig: Take 1 tablet (1 mg) by mouth daily.   isosorbide mononitrate (IMDUR) 30 MG 24 hr tablet   No No   Sig: Take 1 tablet (30 mg) by mouth daily.   losartan (COZAAR) 100 MG tablet  Self Yes No   Sig: Take 100 mg by mouth daily.   metFORMIN (GLUCOPHAGE) 500 MG tablet   No No   Sig: Take 1 tablet (500 mg) by mouth 2 times daily (with meals).   nitroGLYcerin (NITROSTAT) 0.4 MG sublingual tablet  Self No No   Sig: For chest pain place 1 tablet under the tongue every 5 minutes for 3 doses. If symptoms persist 5 minutes after 1st dose call 911.   pantoprazole (PROTONIX) 40 MG EC tablet  Self No No   Sig: Take 1 tablet (40 mg) by mouth 2 times daily (before meals)   rosuvastatin (CRESTOR) 40 MG tablet  Self, Spouse/Significant Other Yes No   Sig: Take 40 mg by mouth daily   spironolactone (ALDACTONE) 25 MG tablet   No No   Sig: Take 1 tablet (25 mg) by mouth daily.      Facility-Administered Medications: None       ALLERGIES:  Allergies   Allergen Reactions    Dilaudid [Hydromorphone] Unknown    Phenytoin Hives and Itching       FAMILY HISTORY:  Family History   Problem Relation Age of Onset    Cerebrovascular Disease Mother        SOCIAL HISTORY:  Social History     Tobacco Use    Smoking status: Never   Substance Use Topics    Alcohol use: Yes    Drug use: No        VITALS:  Patient Vitals for the past  "24 hrs:   BP Temp Temp src Pulse Resp SpO2 Height Weight   11/03/24 2251 (!) 186/78 -- -- 63 -- 96 % -- --   11/03/24 2115 (!) 158/67 -- -- 65 -- 97 % -- --   11/03/24 2051 -- 97.7  F (36.5  C) Oral -- -- -- -- --   11/03/24 2030 (!) 175/72 -- -- 68 12 96 % -- --   11/03/24 2020 (!) 174/76 98.9  F (37.2  C) Temporal 65 20 97 % 1.6 m (5' 3\") 81.2 kg (179 lb)       PHYSICAL EXAM    General Appearance: Well-appearing, well-nourished, no acute distress   Head:  Normocephalic, right forehead and periorbital ecchymosis  Eyes:  PERRL, conjunctiva/corneas clear, EOM's intact, no tenderness palpation of the bony orbital anatomy  ENT:  Lips, mucosa, and tongue normal; membranes are moist without pallor, TMs clear bilaterally  Neck:  Supple, no midline tenderness to palpation  Chest: Reproducible tenderness palpation of the chest wall anteriorly bilaterally  Cardio:  Regular rate and rhythm, no murmur/gallop/rub, hypertensive  Pulm: Mild respiratory distress/tachypnea with some conversational dyspnea the lungs are clear.  Improved per family at bedside from recent hospital stay  Back:  No midline tenderness to palpation, no paraspinal tenderness  Abdomen:  Soft, non-tender  Extremities: Extremities are atraumatic.  He has complained of some pain to the right shoulder in triage but I am not able to reproduce this on my examination with full pain-free range of motion to the shoulder and no focal tenderness to palpation.  Skin:  Skin warm, dry, no rashes  Neuro:  Alert and oriented ×3, moving all extremities, no gross sensory defects     RADIOLOGY/LABS:  Reviewed all pertinent imaging. Please see official radiology report. All pertinent labs reviewed and interpreted.    Results for orders placed or performed during the hospital encounter of 11/03/24   Head CT w/o contrast    Impression    IMPRESSION:  1.  Right periorbital soft tissue swelling without acute intracranial process.  2.  Chronic findings have mildly progressed since " 2020.   CT Chest Pulmonary Embolism w Contrast    Impression    IMPRESSION:  1.  Negative for pulmonary embolism.    2.  Scarring and/or atelectasis in the lungs. No evidence for pneumonitis.    3.  Cardiac enlargement. No effusions.    4.  Coronary artery calcification.   Basic metabolic panel   Result Value Ref Range    Sodium 142 135 - 145 mmol/L    Potassium 3.2 (L) 3.4 - 5.3 mmol/L    Chloride 107 98 - 107 mmol/L    Carbon Dioxide (CO2) 25 22 - 29 mmol/L    Anion Gap 10 7 - 15 mmol/L    Urea Nitrogen 28.0 (H) 8.0 - 23.0 mg/dL    Creatinine 1.02 0.67 - 1.17 mg/dL    GFR Estimate 78 >60 mL/min/1.73m2    Calcium 8.8 8.8 - 10.4 mg/dL    Glucose 141 (H) 70 - 99 mg/dL   Hepatic panel   Result Value Ref Range    Protein Total 5.5 (L) 6.4 - 8.3 g/dL    Albumin 3.4 (L) 3.5 - 5.2 g/dL    Bilirubin Total 0.4 <=1.2 mg/dL    Alkaline Phosphatase 72 40 - 150 U/L    AST 34 0 - 45 U/L    ALT 39 0 - 70 U/L    Bilirubin Direct <0.20 0.00 - 0.30 mg/dL   Result Value Ref Range    Troponin T, High Sensitivity 37 (H) <=22 ng/L   CBC with platelets and differential   Result Value Ref Range    WBC Count 6.4 4.0 - 11.0 10e3/uL    RBC Count 4.37 (L) 4.40 - 5.90 10e6/uL    Hemoglobin 13.3 13.3 - 17.7 g/dL    Hematocrit 40.3 40.0 - 53.0 %    MCV 92 78 - 100 fL    MCH 30.4 26.5 - 33.0 pg    MCHC 33.0 31.5 - 36.5 g/dL    RDW 14.2 10.0 - 15.0 %    Platelet Count 132 (L) 150 - 450 10e3/uL    % Neutrophils 71 %    % Lymphocytes 17 %    % Monocytes 9 %    % Eosinophils 3 %    % Basophils 0 %    % Immature Granulocytes 1 %    NRBCs per 100 WBC 0 <1 /100    Absolute Neutrophils 4.5 1.6 - 8.3 10e3/uL    Absolute Lymphocytes 1.1 0.8 - 5.3 10e3/uL    Absolute Monocytes 0.6 0.0 - 1.3 10e3/uL    Absolute Eosinophils 0.2 0.0 - 0.7 10e3/uL    Absolute Basophils 0.0 0.0 - 0.2 10e3/uL    Absolute Immature Granulocytes 0.0 <=0.4 10e3/uL    Absolute NRBCs 0.0 10e3/uL   Extra Blue Top Tube   Result Value Ref Range    Hold Specimen JIC    Extra Green Top  (Lithium Heparin) Tube   Result Value Ref Range    Hold Specimen JIC    Extra Purple Top Tube   Result Value Ref Range    Hold Specimen JIC    D dimer quantitative   Result Value Ref Range    D-Dimer Quantitative 1.34 (H) 0.00 - 0.50 ug/mL FEU   Result Value Ref Range    Troponin T, High Sensitivity 37 (H) <=22 ng/L   ECG 12-lead with Muse (LHE)   Result Value Ref Range    Systolic Blood Pressure  mmHg    Diastolic Blood Pressure  mmHg    Ventricular Rate 66 BPM    Atrial Rate 66 BPM    TX Interval 178 ms    QRS Duration 104 ms     ms    QTc 431 ms    P Axis 13 degrees    R AXIS -5 degrees    T Axis 44 degrees    Interpretation ECG       Sinus rhythm  Nonspecific T wave abnormality  Abnormal ECG  When compared with ECG of 17-Oct-2024 21:55,  Premature ventricular complexes are no longer Present  Vent. rate has decreased by  42 bpm  Nonspecific T wave abnormality has replaced inverted T waves in Lateral leads  Confirmed by SEE ED PROVIDER NOTE FOR, ECG INTERPRETATION (4000),  HOLGER PLEITEZ ([id number]) on 11/3/2024 9:42:13 PM         EKG:  Sinus rhythm rate 66.  T wave flattening inferiorly.   QTc 431 compared to previous from 10/17/2024 tachycardia and PVCs have resolved.  I have independently reviewed and interpreted the EKG(s) documented above.        Elva Juarez MD  Emergency Medicine  [facility] EMERGENCY ROOM  [address] [address] [phone]  Dept: [phone]     Elva Juarez MD  11/03/24 2329

## 2024-11-04 NOTE — ED NOTES
Pt remains A&O, moving all his extremities well. Pt and daughter are agreeable with plan to discharge home with primary care follow up. Pt ambulates with walker at baseline so was wheelchair assisted to the car for discharge.

## 2024-11-04 NOTE — ED NOTES
EDT Ambulated Pt in malave with walker.  Daughter was present.  Pt stated he felt good.  MD & RN aware.

## 2024-11-04 NOTE — ED TRIAGE NOTES
Pt reports sitting on the living room couch this afternoon, went to stand up, felt dizzy, and fell to floor hitting right side of head on coffee table. Reports pain to right side head and right shoulder. Swelling and bruising noted to right eye. Denies blood thinning medications. No meds PTA.      Triage Assessment (Adult)       Row Name 11/03/24 2021          Triage Assessment    Airway WDL WDL        Respiratory WDL    Respiratory WDL WDL        Skin Circulation/Temperature WDL    Skin Circulation/Temperature WDL WDL        Cardiac WDL    Cardiac WDL WDL        Peripheral/Neurovascular WDL    Peripheral Neurovascular WDL WDL        Cognitive/Neuro/Behavioral WDL    Cognitive/Neuro/Behavioral WDL WDL

## 2024-11-04 NOTE — DISCHARGE INSTRUCTIONS
CT scan of the brain today was normal.  Blood work showed slightly low potassium which was replaced here.  EKG was unremarkable.  Nothing to suggest blood clot, or pulmonary embolism going from the heart out to the lungs.  Make sure you transitions slowly from a seated to a standing position.

## 2025-01-03 ENCOUNTER — APPOINTMENT (OUTPATIENT)
Dept: CT IMAGING | Facility: HOSPITAL | Age: 75
DRG: 315 | End: 2025-01-03
Attending: EMERGENCY MEDICINE
Payer: COMMERCIAL

## 2025-01-03 ENCOUNTER — APPOINTMENT (OUTPATIENT)
Dept: RADIOLOGY | Facility: HOSPITAL | Age: 75
DRG: 315 | End: 2025-01-03
Attending: EMERGENCY MEDICINE
Payer: COMMERCIAL

## 2025-01-03 ENCOUNTER — HOSPITAL ENCOUNTER (INPATIENT)
Facility: HOSPITAL | Age: 75
LOS: 1 days | Discharge: HOME | DRG: 315 | End: 2025-01-06
Attending: EMERGENCY MEDICINE | Admitting: INTERNAL MEDICINE
Payer: COMMERCIAL

## 2025-01-03 ENCOUNTER — APPOINTMENT (OUTPATIENT)
Dept: CT IMAGING | Facility: HOSPITAL | Age: 75
DRG: 315 | End: 2025-01-03
Attending: INTERNAL MEDICINE
Payer: COMMERCIAL

## 2025-01-03 DIAGNOSIS — R07.9 CHEST PAIN, UNSPECIFIED TYPE: ICD-10-CM

## 2025-01-03 DIAGNOSIS — I25.83 CORONARY ARTERY DISEASE DUE TO LIPID RICH PLAQUE: Primary | Chronic | ICD-10-CM

## 2025-01-03 DIAGNOSIS — I25.10 CORONARY ARTERY DISEASE DUE TO LIPID RICH PLAQUE: Primary | Chronic | ICD-10-CM

## 2025-01-03 DIAGNOSIS — I50.31 ACUTE DIASTOLIC HEART FAILURE (H): ICD-10-CM

## 2025-01-03 DIAGNOSIS — H53.8 BLURRED VISION: ICD-10-CM

## 2025-01-03 DIAGNOSIS — R13.10 DYSPHAGIA, UNSPECIFIED TYPE: ICD-10-CM

## 2025-01-03 DIAGNOSIS — N17.9 ACUTE KIDNEY INJURY: ICD-10-CM

## 2025-01-03 LAB
ANION GAP SERPL CALCULATED.3IONS-SCNC: 14 MMOL/L (ref 7–15)
BASE EXCESS BLDV CALC-SCNC: -2.5 MMOL/L (ref -3–3)
BASOPHILS # BLD AUTO: 0 10E3/UL (ref 0–0.2)
BASOPHILS NFR BLD AUTO: 0 %
BUN SERPL-MCNC: 48.7 MG/DL (ref 8–23)
CALCIUM SERPL-MCNC: 9.5 MG/DL (ref 8.8–10.4)
CHLORIDE SERPL-SCNC: 104 MMOL/L (ref 98–107)
CREAT SERPL-MCNC: 2.31 MG/DL (ref 0.67–1.17)
CRP SERPL HS-MCNC: 2.27 MG/L
EGFRCR SERPLBLD CKD-EPI 2021: 29 ML/MIN/1.73M2
EOSINOPHIL # BLD AUTO: 0.1 10E3/UL (ref 0–0.7)
EOSINOPHIL NFR BLD AUTO: 1 %
ERYTHROCYTE [DISTWIDTH] IN BLOOD BY AUTOMATED COUNT: 13.5 % (ref 10–15)
FLUAV RNA SPEC QL NAA+PROBE: NEGATIVE
FLUBV RNA RESP QL NAA+PROBE: NEGATIVE
GLUCOSE SERPL-MCNC: 124 MG/DL (ref 70–99)
HCO3 BLDV-SCNC: 25 MMOL/L (ref 21–28)
HCO3 SERPL-SCNC: 22 MMOL/L (ref 22–29)
HCT VFR BLD AUTO: 46.2 % (ref 40–53)
HGB BLD-MCNC: 15.5 G/DL (ref 13.3–17.7)
HOLD SPECIMEN: NORMAL
IMM GRANULOCYTES # BLD: 0 10E3/UL
IMM GRANULOCYTES NFR BLD: 1 %
LYMPHOCYTES # BLD AUTO: 1.6 10E3/UL (ref 0.8–5.3)
LYMPHOCYTES NFR BLD AUTO: 25 %
MAGNESIUM SERPL-MCNC: 1.9 MG/DL (ref 1.7–2.3)
MCH RBC QN AUTO: 31 PG (ref 26.5–33)
MCHC RBC AUTO-ENTMCNC: 33.5 G/DL (ref 31.5–36.5)
MCV RBC AUTO: 92 FL (ref 78–100)
MONOCYTES # BLD AUTO: 0.6 10E3/UL (ref 0–1.3)
MONOCYTES NFR BLD AUTO: 9 %
NEUTROPHILS # BLD AUTO: 3.9 10E3/UL (ref 1.6–8.3)
NEUTROPHILS NFR BLD AUTO: 63 %
NRBC # BLD AUTO: 0 10E3/UL
NRBC BLD AUTO-RTO: 0 /100
NT-PROBNP SERPL-MCNC: 56 PG/ML (ref 0–900)
O2/TOTAL GAS SETTING VFR VENT: 21 %
OXYHGB MFR BLDV: 24 % (ref 70–75)
PCO2 BLDV: 49 MM HG (ref 40–50)
PH BLDV: 7.3 [PH] (ref 7.32–7.43)
PLATELET # BLD AUTO: 157 10E3/UL (ref 150–450)
PO2 BLDV: 19 MM HG (ref 25–47)
POTASSIUM SERPL-SCNC: 4.6 MMOL/L (ref 3.4–5.3)
PROCALCITONIN SERPL IA-MCNC: 0.22 NG/ML
RBC # BLD AUTO: 5 10E6/UL (ref 4.4–5.9)
RSV RNA SPEC NAA+PROBE: NEGATIVE
SAO2 % BLDV: 23.8 % (ref 70–75)
SARS-COV-2 RNA RESP QL NAA+PROBE: NEGATIVE
SODIUM SERPL-SCNC: 140 MMOL/L (ref 135–145)
TROPONIN T SERPL HS-MCNC: 46 NG/L
TROPONIN T SERPL HS-MCNC: 57 NG/L
WBC # BLD AUTO: 6.1 10E3/UL (ref 4–11)

## 2025-01-03 PROCEDURE — G0378 HOSPITAL OBSERVATION PER HR: HCPCS

## 2025-01-03 PROCEDURE — 99285 EMERGENCY DEPT VISIT HI MDM: CPT | Mod: 25

## 2025-01-03 PROCEDURE — 71250 CT THORAX DX C-: CPT

## 2025-01-03 PROCEDURE — 83735 ASSAY OF MAGNESIUM: CPT | Performed by: EMERGENCY MEDICINE

## 2025-01-03 PROCEDURE — 93005 ELECTROCARDIOGRAM TRACING: CPT | Performed by: INTERNAL MEDICINE

## 2025-01-03 PROCEDURE — 96361 HYDRATE IV INFUSION ADD-ON: CPT

## 2025-01-03 PROCEDURE — 96360 HYDRATION IV INFUSION INIT: CPT | Mod: 59

## 2025-01-03 PROCEDURE — 99223 1ST HOSP IP/OBS HIGH 75: CPT | Performed by: INTERNAL MEDICINE

## 2025-01-03 PROCEDURE — 80048 BASIC METABOLIC PNL TOTAL CA: CPT | Performed by: EMERGENCY MEDICINE

## 2025-01-03 PROCEDURE — 83880 ASSAY OF NATRIURETIC PEPTIDE: CPT | Performed by: INTERNAL MEDICINE

## 2025-01-03 PROCEDURE — 84145 PROCALCITONIN (PCT): CPT | Performed by: INTERNAL MEDICINE

## 2025-01-03 PROCEDURE — 85018 HEMOGLOBIN: CPT | Performed by: EMERGENCY MEDICINE

## 2025-01-03 PROCEDURE — 71046 X-RAY EXAM CHEST 2 VIEWS: CPT

## 2025-01-03 PROCEDURE — 85004 AUTOMATED DIFF WBC COUNT: CPT | Performed by: EMERGENCY MEDICINE

## 2025-01-03 PROCEDURE — 70450 CT HEAD/BRAIN W/O DYE: CPT

## 2025-01-03 PROCEDURE — 82805 BLOOD GASES W/O2 SATURATION: CPT | Performed by: EMERGENCY MEDICINE

## 2025-01-03 PROCEDURE — 84484 ASSAY OF TROPONIN QUANT: CPT | Performed by: EMERGENCY MEDICINE

## 2025-01-03 PROCEDURE — 86141 C-REACTIVE PROTEIN HS: CPT | Performed by: INTERNAL MEDICINE

## 2025-01-03 PROCEDURE — 250N000013 HC RX MED GY IP 250 OP 250 PS 637: Performed by: INTERNAL MEDICINE

## 2025-01-03 PROCEDURE — 87637 SARSCOV2&INF A&B&RSV AMP PRB: CPT | Performed by: INTERNAL MEDICINE

## 2025-01-03 PROCEDURE — 258N000003 HC RX IP 258 OP 636: Performed by: EMERGENCY MEDICINE

## 2025-01-03 PROCEDURE — 82435 ASSAY OF BLOOD CHLORIDE: CPT | Performed by: EMERGENCY MEDICINE

## 2025-01-03 PROCEDURE — 93005 ELECTROCARDIOGRAM TRACING: CPT | Performed by: EMERGENCY MEDICINE

## 2025-01-03 PROCEDURE — 36415 COLL VENOUS BLD VENIPUNCTURE: CPT | Performed by: EMERGENCY MEDICINE

## 2025-01-03 PROCEDURE — 250N000013 HC RX MED GY IP 250 OP 250 PS 637: Performed by: EMERGENCY MEDICINE

## 2025-01-03 RX ORDER — ACETAMINOPHEN 650 MG/1
650 SUPPOSITORY RECTAL EVERY 4 HOURS PRN
Status: DISCONTINUED | OUTPATIENT
Start: 2025-01-03 | End: 2025-01-06 | Stop reason: HOSPADM

## 2025-01-03 RX ORDER — ISOSORBIDE MONONITRATE 30 MG/1
30 TABLET, EXTENDED RELEASE ORAL EVERY MORNING
COMMUNITY

## 2025-01-03 RX ORDER — ACETAMINOPHEN 325 MG/1
650 TABLET ORAL EVERY 4 HOURS PRN
Status: DISCONTINUED | OUTPATIENT
Start: 2025-01-03 | End: 2025-01-06 | Stop reason: HOSPADM

## 2025-01-03 RX ORDER — ASPIRIN 81 MG
81 TABLET,CHEWABLE ORAL EVERY MORNING
COMMUNITY
Start: 2024-04-02

## 2025-01-03 RX ORDER — CALCIUM CARBONATE 500 MG/1
1000 TABLET, CHEWABLE ORAL 4 TIMES DAILY PRN
Status: DISCONTINUED | OUTPATIENT
Start: 2025-01-03 | End: 2025-01-06 | Stop reason: HOSPADM

## 2025-01-03 RX ORDER — ISOSORBIDE MONONITRATE 30 MG/1
30 TABLET, EXTENDED RELEASE ORAL EVERY MORNING
Status: DISCONTINUED | OUTPATIENT
Start: 2025-01-04 | End: 2025-01-06 | Stop reason: HOSPADM

## 2025-01-03 RX ORDER — ASPIRIN 81 MG/1
81 TABLET, CHEWABLE ORAL EVERY MORNING
Status: DISCONTINUED | OUTPATIENT
Start: 2025-01-04 | End: 2025-01-06 | Stop reason: HOSPADM

## 2025-01-03 RX ORDER — ONDANSETRON 2 MG/ML
4 INJECTION INTRAMUSCULAR; INTRAVENOUS EVERY 6 HOURS PRN
Status: DISCONTINUED | OUTPATIENT
Start: 2025-01-03 | End: 2025-01-06 | Stop reason: HOSPADM

## 2025-01-03 RX ORDER — LOSARTAN POTASSIUM 50 MG/1
100 TABLET ORAL EVERY MORNING
Status: DISCONTINUED | OUTPATIENT
Start: 2025-01-04 | End: 2025-01-06 | Stop reason: HOSPADM

## 2025-01-03 RX ORDER — BUMETANIDE 1 MG/1
1 TABLET ORAL DAILY
Status: DISCONTINUED | OUTPATIENT
Start: 2025-01-04 | End: 2025-01-06 | Stop reason: HOSPADM

## 2025-01-03 RX ORDER — LIDOCAINE 40 MG/G
CREAM TOPICAL
Status: DISCONTINUED | OUTPATIENT
Start: 2025-01-03 | End: 2025-01-06 | Stop reason: HOSPADM

## 2025-01-03 RX ORDER — AMLODIPINE BESYLATE 5 MG/1
10 TABLET ORAL EVERY MORNING
Status: DISCONTINUED | OUTPATIENT
Start: 2025-01-04 | End: 2025-01-06 | Stop reason: HOSPADM

## 2025-01-03 RX ORDER — NITROGLYCERIN 0.4 MG/1
0.4 TABLET SUBLINGUAL EVERY 5 MIN PRN
Status: DISCONTINUED | OUTPATIENT
Start: 2025-01-03 | End: 2025-01-06 | Stop reason: HOSPADM

## 2025-01-03 RX ORDER — ONDANSETRON 4 MG/1
4 TABLET, ORALLY DISINTEGRATING ORAL EVERY 6 HOURS PRN
Status: DISCONTINUED | OUTPATIENT
Start: 2025-01-03 | End: 2025-01-06 | Stop reason: HOSPADM

## 2025-01-03 RX ORDER — ALLOPURINOL 300 MG/1
300 TABLET ORAL 2 TIMES DAILY
Status: DISCONTINUED | OUTPATIENT
Start: 2025-01-03 | End: 2025-01-06 | Stop reason: HOSPADM

## 2025-01-03 RX ORDER — AMOXICILLIN 250 MG
1 CAPSULE ORAL 2 TIMES DAILY PRN
Status: DISCONTINUED | OUTPATIENT
Start: 2025-01-03 | End: 2025-01-06 | Stop reason: HOSPADM

## 2025-01-03 RX ORDER — AMOXICILLIN 250 MG
2 CAPSULE ORAL 2 TIMES DAILY PRN
Status: DISCONTINUED | OUTPATIENT
Start: 2025-01-03 | End: 2025-01-06 | Stop reason: HOSPADM

## 2025-01-03 RX ORDER — ASPIRIN 325 MG
325 TABLET ORAL ONCE
Status: COMPLETED | OUTPATIENT
Start: 2025-01-03 | End: 2025-01-03

## 2025-01-03 RX ADMIN — ASPIRIN 325 MG: 325 TABLET ORAL at 14:42

## 2025-01-03 RX ADMIN — ALLOPURINOL 300 MG: 300 TABLET ORAL at 21:33

## 2025-01-03 RX ADMIN — SODIUM CHLORIDE 500 ML: 9 INJECTION, SOLUTION INTRAVENOUS at 15:43

## 2025-01-03 RX ADMIN — SODIUM CHLORIDE 500 ML: 9 INJECTION, SOLUTION INTRAVENOUS at 18:45

## 2025-01-03 ASSESSMENT — VISUAL ACUITY
OS: 20/100;WITHOUT CORRECTIVE LENSES
OS: 20/100;WITHOUT CORRECTIVE LENSES
OU: NORMAL
OU: 20/100;WITHOUT CORRECTIVE LENSES
OD: 20/100;WITHOUT CORRECTIVE LENSES
OD: 20/100;WITHOUT CORRECTIVE LENSES

## 2025-01-03 ASSESSMENT — ACTIVITIES OF DAILY LIVING (ADL)
ADLS_ACUITY_SCORE: 57

## 2025-01-03 NOTE — MEDICATION SCRIBE - ADMISSION MEDICATION HISTORY
Medication Scribe Admission Medication History    Admission medication history is complete. The information provided in this note is only as accurate as the sources available at the time of the update.    Information Source(s): Patient and Family member via in-person and phone    Pertinent Information: patient's medications are being managed by son and daughter. Called daughter, Myles @ [phone] (Works as NA on [facility] [facility] [place] and conducted medication interview.     Patient reported to be no longer taking: Pantoprazole, Crestor, Spironolactone    Allopurinol: patient repeatedly reported taking BID. No Sure script data fill in last year.     Changes made to PTA medication list:  Added: Imdur, Glucophage (both were reported by patient as still taking), Aspirin   Deleted:  Pantoprazole, Crestor, Spironolactone  Changed: None    Allergies reviewed with patient and updates made in EHR: yes    Medication History Completed By: Manohar Cadet 1/3/2025 5:05 PM    PTA Med List   Medication Sig Last Dose/Taking    allopurinol (ZYLOPRIM) 300 MG tablet Take 300 mg by mouth 2 times daily 1/3/2025 Morning    amLODIPine (NORVASC) 10 MG tablet Take 10 mg by mouth every morning. 1/3/2025 Morning    ASPIRIN LOW DOSE 81 MG chewable tablet Take 81 mg by mouth every morning. 1/3/2025 Morning    bumetanide (BUMEX) 1 MG tablet Take 1 tablet (1 mg) by mouth daily. 1/3/2025 Morning    isosorbide mononitrate (IMDUR) 30 MG 24 hr tablet Take 30 mg by mouth every morning. 1/3/2025 Morning    losartan (COZAAR) 100 MG tablet Take 100 mg by mouth every morning. 1/3/2025 Morning    metFORMIN (GLUCOPHAGE) 500 MG tablet Take 500 mg by mouth 2 times daily (with meals). 1/3/2025 Morning    nitroGLYcerin (NITROSTAT) 0.4 MG sublingual tablet For chest pain place 1 tablet under the tongue every 5 minutes for 3 doses. If symptoms persist 5 minutes after 1st dose call 911. Taking    VITAMIN D3 25 MCG (1000 UT) tablet Take 25 mcg by mouth every morning. 1/3/2025  Morning

## 2025-01-03 NOTE — ED TRIAGE NOTES
Pt arrived via triage. Pt reports chest pain for last week and today had vision loss with pain.     Triage Assessment (Adult)       Row Name 01/03/25 1417          Triage Assessment    Airway WDL WDL        Respiratory WDL    Respiratory WDL WDL        Skin Circulation/Temperature WDL    Skin Circulation/Temperature WDL WDL        Cardiac WDL    Cardiac WDL chest pain        Chest Pain Assessment    Chest Pain Location midsternal     Chest Pain Radiation back     Character tightness     Associated Signs/Symptoms dyspnea        Peripheral/Neurovascular WDL    Peripheral Neurovascular WDL WDL        Cognitive/Neuro/Behavioral WDL    Cognitive/Neuro/Behavioral WDL WDL

## 2025-01-03 NOTE — ED PROVIDER NOTES
EMERGENCY DEPARTMENT ENCOUNTER      NAME: Sedrick Murray  AGE: 74 year old male  YOB: 1950  MRN: [id number]  EVALUATION DATE & TIME: 1/3/2025  2:14 PM    PCP: Milvia Costa    ED PROVIDER: Luda Power M.D.      Chief Complaint   Patient presents with    Chest Pain     FINAL IMPRESSION:  1. Acute kidney injury (H)    2. Chest pain, unspecified type    3. Blurred vision      ED COURSE & MEDICAL DECISION MAKING:    Pertinent Labs & Imaging studies reviewed. (See chart for details)  ED Course as of 01/03/25 2300   Fri Jan 03, 2025   1435 Patient is a 74-year-old male comes in today for evaluation of chest pain for the last 1 week that he reports is constant.  He said he has similar episode in October and was admitted to the hospital.  He also complains of some new shortness of breath that was the same as when he was here in October as well.  He denies any cough but does have some sneezing.  He also reports fevers over the last week.  He also notices that he has had some blurry vision since 10 AM involving both eyes.  He has a slight headache.  He gets headaches sometimes.  He denies any history of vision changes.  He has a history of hypertension and says his blood pressure is normally elevated but on arrival here it was 92/50 so I do wonder if some of his vision changes could just be related to some low blood pressure.  He has pretty significant past medical history including CHF and hypertension and diabetes and chronic kidney disease as well as a stroke in the past.  He has some residual left-sided weakness at baseline and he feels like that is actually a little bit better than his baseline today.  We will get CTA imaging of his head and neck.  Will check lab work and plan to get chest x-ray.  His EKG does show some T wave inversions in the lateral leads which appear new from previous EKG.  We moved the patient immediately back into a room and labs have been collected.  I used the [facility]  to  "discuss his history, physical exam, and plan going forward and he is in agreement.  I ordered aspirin on him.  Hold off on any IV fluid despite the borderline blood pressure given his history of heart failure.  I discussed the plan with the patient and he is in agreement.   1506 Creatinine is elevated 2.31 from his baseline of 1.07.  He has got acute kidney injury and will for sure need admission to the hospital for that.  Because of that I will order a little IV fluid at this time with his hypotension.   1633 CT was negative for an acute bleed.  Chest x-ray was unremarkable.  Patient does have uremia with a BUN of 48.7 and creatinine of 2.31.  This is new for him.  I wonder if that could be related to his blurry vision.  I will work on getting him admitted to the hospital.   1651 I spoke with Dr. Barlow with cardiology.  He thinks that the troponin elevation is likely just related to the acute kidney injury and his chronic slightly elevated troponin.  He agrees with holding off on heparin unless the patient's troponins are trending up in a more concerning way.   1659 Updated the patient and family on the findings and plan for admission to the hospital.  They are in agreement.   1847 I just got a call from Dr. Farias that the patient's blood pressure was little low.  This is the first I had heard of it.  He was in the 90s before and then seem to come up with some fluids but I ordered some more fluid on him.  Will see if that helps.       Medical Decision Making    History:  Supplemental history from: None  External Record(s) reviewed: I reviewed the patient's discharge summary from 10/21/2024.  At that time he was discharged from [facility] after several day stay for respiratory failure secondary to the care of \"I had pneumonia.  He was having some chest pain and they thought it was musculoskeletal in nature although patient was on Imdur.  He has a history of an angiogram done which showed 80% stenosis of " the distal LAD with mass to distal RCA.  He has some nocturnal hypoxia but that that was related to obstructive sleep apnea.    Work Up:  Emergent/Severe conditions considered and evaluated for: ACS, pericarditis, myocarditis, pneumothorax, pneumonia, esophageal rupture, Electrolyte abnormality, hyperglycemia, hypoglycemia, acidosis, anemia, thrombocytopenia, renal failure, dehydration, hemorrhagic stroke  I independently reviewed and interpreted EKG and head CT which showed no intracranial hemorrhage. See full radiology report for all details. Rhythm strip shows sinus rhythm at 79 bpm.  In addition to work up documented, I considered the following work up: Considered CT angiogram but patient did not have a GFR that took accommodate  Medications given that require intensive monitoring for toxicity: None    External consultation:  Discussion of management with another provider: Hospitalist, cardiology    Complicating factors:  Care impacted by chronic illness: thrombocytopenic disorder, mitral valve disorder, aortic valve regurgitation, ASCVD, CAD, gout, HTN, left-sided weakness, syncope, chronic diastolic heart failure, CVA, HLD, diabetes, CKD    Disposition considerations: Admission        At the conclusion of the encounter I discussed  the results of all of the tests and the disposition with patient.   All questions were answered.  The patient acknowledged understanding and was involved in the decision making regarding the overall care plan.      MEDICATIONS GIVEN IN THE EMERGENCY:  Medications   lidocaine 1 % 0.1-1 mL (has no administration in time range)   lidocaine (LMX4) cream (has no administration in time range)   sodium chloride (PF) 0.9% PF flush 3 mL (3 mLs Intracatheter $Given 1/3/25 5200)   sodium chloride (PF) 0.9% PF flush 3 mL (has no administration in time range)   senna-docusate (SENOKOT-S/PERICOLACE) 8.6-50 MG per tablet 1 tablet (has no administration in time range)     Or   senna-docusate  (SENOKOT-S/PERICOLACE) 8.6-50 MG per tablet 2 tablet (has no administration in time range)   calcium carbonate (TUMS) chewable tablet 1,000 mg (has no administration in time range)   acetaminophen (TYLENOL) tablet 650 mg (has no administration in time range)     Or   acetaminophen (TYLENOL) Suppository 650 mg (has no administration in time range)   ondansetron (ZOFRAN ODT) ODT tab 4 mg (has no administration in time range)     Or   ondansetron (ZOFRAN) injection 4 mg (has no administration in time range)   allopurinol (ZYLOPRIM) tablet 300 mg (300 mg Oral $Given 1/3/25 2133)   aspirin (ASA) chewable tablet 81 mg (has no administration in time range)   bumetanide (BUMEX) tablet 1 mg ( Oral Automatically Held 1/7/25 0800)   amLODIPine (NORVASC) tablet 10 mg ( Oral Automatically Held 1/7/25 0800)   isosorbide mononitrate (IMDUR) 24 hr tablet 30 mg (has no administration in time range)   losartan (COZAAR) tablet 100 mg ( Oral Automatically Held 1/7/25 0800)   metFORMIN (GLUCOPHAGE) tablet 500 mg ( Oral Automatically Held 1/6/25 1800)   nitroGLYcerin (NITROSTAT) sublingual tablet 0.4 mg (has no administration in time range)   aspirin (ASA) tablet 325 mg (325 mg Oral $Given 1/3/25 1442)   sodium chloride 0.9% BOLUS 500 mL (0 mLs Intravenous Stopped 1/3/25 1654)   sodium chloride 0.9% BOLUS 500 mL (0 mLs Intravenous Stopped 1/3/25 2009)     =================================================================    HPI    Triage Note: Pt arrived via triage. Pt reports chest pain for last week and today had vision loss with pain.     Triage Assessment (Adult)       Row Name 01/03/25 6580          Triage Assessment    Airway WDL WDL        Respiratory WDL    Respiratory WDL WDL        Skin Circulation/Temperature WDL    Skin Circulation/Temperature WDL WDL        Cardiac WDL    Cardiac WDL chest pain        Chest Pain Assessment    Chest Pain Location midsternal     Chest Pain Radiation back     Character tightness     Associated  Signs/Symptoms dyspnea        Peripheral/Neurovascular WDL    Peripheral Neurovascular WDL WDL        Cognitive/Neuro/Behavioral WDL    Cognitive/Neuro/Behavioral WDL WDL                   Use of : Sahra  through video console      Sedrick Murray is a 74 year old male who presents for evaluation of chest pain for the last week that has been constant as well as some blurry vision since 10:00 this morning.      PAST MEDICAL HISTORY:  Past Medical History:   Diagnosis Date    Anxiety     Back pain     Cerebral vascular accident (H)     Chest pain     Chronic pain     Diarrhea     Dyslipidemia 9/1/2014    Gout     Hemorrhagic stroke (H)     Hypertension        PAST SURGICAL HISTORY:  Past Surgical History:   Procedure Laterality Date    CHOLECYSTECTOMY      CV CORONARY ANGIOGRAM N/A 4/12/2024    Procedure: Coronary Angiogram;  Surgeon: Phu Delgado MD;  Location: [facility] CATH LAB CV    CV LEFT HEART CATH N/A 4/12/2024    Procedure: Left Heart Catheterization;  Surgeon: Phu Delgado MD;  Location: [facility] CATH LAB CV    IR MISCELLANEOUS PROCEDURE  3/9/2013    IR MISCELLANEOUS PROCEDURE  3/9/2013       CURRENT MEDICATIONS:      Current Facility-Administered Medications:     acetaminophen (TYLENOL) tablet 650 mg, 650 mg, Oral, Q4H PRN **OR** acetaminophen (TYLENOL) Suppository 650 mg, 650 mg, Rectal, Q4H PRN, Mani Farias MD    allopurinol (ZYLOPRIM) tablet 300 mg, 300 mg, Oral, BID, Mani Farias MD, 300 mg at 01/03/25 2133    [Held by provider] amLODIPine (NORVASC) tablet 10 mg, 10 mg, Oral, Jarrett ARENAS Musa O, MD    [START ON 1/4/2025] aspirin (ASA) chewable tablet 81 mg, 81 mg, Oral, Jarrett ARENAS Musa O, MD    [Held by provider] bumetanide (BUMEX) tablet 1 mg, 1 mg, Oral, Daily, Mani Farias MD    calcium carbonate (TUMS) chewable tablet 1,000 mg, 1,000 mg, Oral, 4x Daily PRN, Mani Farias MD    [START ON 1/4/2025] isosorbide mononitrate (IMDUR) 24 hr tablet 30  mg, 30 mg, Oral, Jarrett ARENAS Musa O, MD    lidocaine (LMX4) cream, , Topical, Q1H PRN, Mani Farias MD    lidocaine 1 % 0.1-1 mL, 0.1-1 mL, Other, Q1H PRN, Mani Farias MD    [Held by provider] losartan (COZAAR) tablet 100 mg, 100 mg, Oral, Jarrett ARENAS Musa O, MD    [Held by provider] metFORMIN (GLUCOPHAGE) tablet 500 mg, 500 mg, Oral, BID w/meals, Mani Farias MD    nitroGLYcerin (NITROSTAT) sublingual tablet 0.4 mg, 0.4 mg, Sublingual, Q5 Min PRN, Mani Farias MD    ondansetron (ZOFRAN ODT) ODT tab 4 mg, 4 mg, Oral, Q6H PRN **OR** ondansetron (ZOFRAN) injection 4 mg, 4 mg, Intravenous, Q6H PRN, Mani Farias MD    senna-docusate (SENOKOT-S/PERICOLACE) 8.6-50 MG per tablet 1 tablet, 1 tablet, Oral, BID PRN **OR** senna-docusate (SENOKOT-S/PERICOLACE) 8.6-50 MG per tablet 2 tablet, 2 tablet, Oral, BID PRN, Mani Farias MD    sodium chloride (PF) 0.9% PF flush 3 mL, 3 mL, Intracatheter, Q8H, Mani Farias MD, 3 mL at 01/03/25 1844    sodium chloride (PF) 0.9% PF flush 3 mL, 3 mL, Intracatheter, q1 min prn, Mani Farias MD    Current Outpatient Medications:     allopurinol (ZYLOPRIM) 300 MG tablet, Take 300 mg by mouth 2 times daily, Disp: , Rfl:     amLODIPine (NORVASC) 10 MG tablet, Take 10 mg by mouth every morning., Disp: , Rfl:     ASPIRIN LOW DOSE 81 MG chewable tablet, Take 81 mg by mouth every morning., Disp: , Rfl:     bumetanide (BUMEX) 1 MG tablet, Take 1 tablet (1 mg) by mouth daily., Disp: 30 tablet, Rfl: 1    isosorbide mononitrate (IMDUR) 30 MG 24 hr tablet, Take 30 mg by mouth every morning., Disp: , Rfl:     losartan (COZAAR) 100 MG tablet, Take 100 mg by mouth every morning., Disp: , Rfl:     metFORMIN (GLUCOPHAGE) 500 MG tablet, Take 500 mg by mouth 2 times daily (with meals)., Disp: , Rfl:     nitroGLYcerin (NITROSTAT) 0.4 MG sublingual tablet, For chest pain place 1 tablet under the tongue every 5 minutes for 3 doses. If symptoms persist 5 minutes after  1st dose call 911., Disp: 20 tablet, Rfl: 3    VITAMIN D3 25 MCG (1000 UT) tablet, Take 25 mcg by mouth every morning., Disp: , Rfl:     Facility-Administered Medications Ordered in Other Encounters:     iodixanol (VISIPAQUE 320) injection, , , Once PRN, Phu Delgado MD, 65 mL at 04/12/24 1410    ALLERGIES:  Allergies   Allergen Reactions    Dilaudid [Hydromorphone] Unknown    Phenytoin Hives and Itching       FAMILY HISTORY:  Family History   Problem Relation Age of Onset    Cerebrovascular Disease Mother        SOCIAL HISTORY:   Social History     Socioeconomic History    Marital status:    Tobacco Use    Smoking status: Never   Substance and Sexual Activity    Alcohol use: Yes    Drug use: No   Social History Narrative    Patient presented to the ED with his wife 04/25/17       Social Drivers of Health     Financial Resource Strain: Low Risk  (10/17/2024)    Financial Resource Strain     Within the past 12 months, have you or your family members you live with been unable to get utilities (heat, electricity) when it was really needed?: No   Food Insecurity: Low Risk  (10/17/2024)    Food Insecurity     Within the past 12 months, did you worry that your food would run out before you got money to buy more?: No     Within the past 12 months, did the food you bought just not last and you didn t have money to get more?: No   Transportation Needs: Low Risk  (10/17/2024)    Transportation Needs     Within the past 12 months, has lack of transportation kept you from medical appointments, getting your medicines, non-medical meetings or appointments, work, or from getting things that you need?: No    Received from [facility], [facility]    Social Connections   Interpersonal Safety: Low Risk  (10/14/2024)    Interpersonal Safety     Do you feel physically and emotionally safe where you currently live?: Yes     Within the past 12 months, have you  been hit, slapped, kicked or otherwise physically hurt by someone?: No     Within the past 12 months, have you been humiliated or emotionally abused in other ways by your partner or ex-partner?: No   Housing Stability: Low Risk  (10/17/2024)    Housing Stability     Do you have housing? : Yes     Are you worried about losing your housing?: No       PHYSICAL EXAM    VITAL SIGNS: /58   Pulse 70   Temp 98.1  F (36.7  C) (Oral)   Resp 21   Wt 79.8 kg (176 lb)   SpO2 95%   BMI 31.18 kg/m     GENERAL: Awake, alert, answering questions appropriately, slight left facial droop, extraocular movements intact.  Pupils equally round bilaterally  SPEECH:  Easy to understand speech, Normal volume and clyde  PULMONARY: No respiratory distress, Lungs clear to auscultation bilaterally  CARDIOVASCULAR: Regular rate and rhythm, Distal pulses present and normal.  ABDOMINAL: Soft, Nondistended, Nontender, No rebound or guarding, No palpable masses  EXTREMITIES: No lower extremity edema.  Weakness of the left arm and left leg compared to the right side.  Patient reports this is his baseline.  PSYCH: Normal mood and affect     LAB:  All pertinent labs reviewed and interpreted.  Results for orders placed or performed during the hospital encounter of 01/03/25   XR Chest 2 Views    Impression    IMPRESSION: Bibasilar atelectasis. No pleural effusion or pneumothorax. Stable heart size and mediastinal contours. Degenerative changes of the spine.   CT Head w/o Contrast    Impression    IMPRESSION:  1.  No acute intracranial findings.  2.  Mild age-related changes.   CT Chest w/o Contrast    Impression    IMPRESSION:   1.  Chronic subpleural interstitial change in the mid and lower lungs, likely interstitial lung disease. No focal consolidation.    2.  Coronary artery calcification.    3.  Cholecystectomy.    4.  Heart is mildly enlarged.    5.  Fatty liver.     Basic metabolic panel   Result Value Ref Range    Sodium 140 135 - 145  mmol/L    Potassium 4.6 3.4 - 5.3 mmol/L    Chloride 104 98 - 107 mmol/L    Carbon Dioxide (CO2) 22 22 - 29 mmol/L    Anion Gap 14 7 - 15 mmol/L    Urea Nitrogen 48.7 (H) 8.0 - 23.0 mg/dL    Creatinine 2.31 (H) 0.67 - 1.17 mg/dL    GFR Estimate 29 (L) >60 mL/min/1.73m2    Calcium 9.5 8.8 - 10.4 mg/dL    Glucose 124 (H) 70 - 99 mg/dL   Result Value Ref Range    Troponin T, High Sensitivity 57 (H) <=22 ng/L   Result Value Ref Range    Magnesium 1.9 1.7 - 2.3 mg/dL   Blood gas venous   Result Value Ref Range    pH Venous 7.30 (L) 7.32 - 7.43    pCO2 Venous 49 40 - 50 mm Hg    pO2 Venous 19 (L) 25 - 47 mm Hg    Bicarbonate Venous 25 21 - 28 mmol/L    Base Excess/Deficit Venous -2.5 -3.0 - 3.0 mmol/L    FIO2 21     Oxyhemoglobin Venous 24 (L) 70 - 75 %    O2 Sat, Venous 23.8 (L) 70.0 - 75.0 %   CBC with platelets and differential   Result Value Ref Range    WBC Count 6.1 4.0 - 11.0 10e3/uL    RBC Count 5.00 4.40 - 5.90 10e6/uL    Hemoglobin 15.5 13.3 - 17.7 g/dL    Hematocrit 46.2 40.0 - 53.0 %    MCV 92 78 - 100 fL    MCH 31.0 26.5 - 33.0 pg    MCHC 33.5 31.5 - 36.5 g/dL    RDW 13.5 10.0 - 15.0 %    Platelet Count 157 150 - 450 10e3/uL    % Neutrophils 63 %    % Lymphocytes 25 %    % Monocytes 9 %    % Eosinophils 1 %    % Basophils 0 %    % Immature Granulocytes 1 %    NRBCs per 100 WBC 0 <1 /100    Absolute Neutrophils 3.9 1.6 - 8.3 10e3/uL    Absolute Lymphocytes 1.6 0.8 - 5.3 10e3/uL    Absolute Monocytes 0.6 0.0 - 1.3 10e3/uL    Absolute Eosinophils 0.1 0.0 - 0.7 10e3/uL    Absolute Basophils 0.0 0.0 - 0.2 10e3/uL    Absolute Immature Granulocytes 0.0 <=0.4 10e3/uL    Absolute NRBCs 0.0 10e3/uL   Extra Blue Top Tube   Result Value Ref Range    Hold Specimen JIC    Extra Red Top Tube   Result Value Ref Range    Hold Specimen JIC    Extra Green Top (Lithium Heparin) Tube   Result Value Ref Range    Hold Specimen JIC    Extra Purple Top Tube   Result Value Ref Range    Hold Specimen JIC    Result Value Ref Range     Troponin T, High Sensitivity 46 (H) <=22 ng/L   Nt probnp inpatient   Result Value Ref Range    N terminal Pro BNP Inpatient 56 0 - 900 pg/mL   Influenza A/B, RSV and SARS-CoV2 PCR (COVID-19) Nasopharyngeal    Specimen: Nasopharyngeal; Swab   Result Value Ref Range    Influenza A PCR Negative Negative    Influenza B PCR Negative Negative    RSV PCR Negative Negative    SARS CoV2 PCR Negative Negative   Result Value Ref Range    Procalcitonin 0.22 <0.50 ng/mL   C-Reactive Protein, High Sensitivity   Result Value Ref Range    C-Reactive Protein High Sensitivity 2.27        RADIOLOGY:  CT Chest w/o Contrast   Final Result   IMPRESSION:    1.  Chronic subpleural interstitial change in the mid and lower lungs, likely interstitial lung disease. No focal consolidation.      2.  Coronary artery calcification.      3.  Cholecystectomy.      4.  Heart is mildly enlarged.      5.  Fatty liver.         XR Chest 2 Views   Final Result   IMPRESSION: Bibasilar atelectasis. No pleural effusion or pneumothorax. Stable heart size and mediastinal contours. Degenerative changes of the spine.      CT Head w/o Contrast   Final Result   IMPRESSION:   1.  No acute intracranial findings.   2.  Mild age-related changes.      Echocardiogram Limited    (Results Pending)         EKG:    Date and time: January 3, 2025 at 1401  Rate: 82 bpm  Rhythm: Normal sinus rhythm  VA interval: 180 ms  QRS interval: 92 ms  QT/QTc: 384/448 ms  ST changes or T wave changes: T wave inversions in the lateral leads  Change from prior ECG: T wave inversions are new from prior  I have independently reviewed and interpreted this EKG.     Luda Power M.D.  Emergency Medicine  [facility] EMERGENCY DEPARTMENT  [facility] [facility] [id number] [phone]  Dept: [phone]     Luda Power MD  01/03/25 7017

## 2025-01-03 NOTE — H&P
[facility]    History and Physical - Hospitalist Service       Date of Admission:  1/3/2025    Sedrick Murray is a 74 year old male history of CAD, CKD stage III, diabetes, who presents for evaluation of chest pain for the last week that has been constant today as well as some blurry vision since 10:00 this morning.       Assessment & Plan    Chest pain with BRAVO  History of CAD, angio 4/24 with LAD has diffuse lumen irregularity proximal to mid segment; moderate stenoses mid segment and 70 to 80% segment apical LAD.  Huge aneurysm RCA  EKG shows inferior lateral T wave inversions, new  Troponin 57, repeat 46  ED spoke to cardiology on-call who did not recommend IV heparin for now, feel troponin is likely from CKD.  ASA given in the ED  Limited echo ordered for a.m., admit to cardiac monitoring, trend troponin, cardiology consulted    Blurry vision, bilateral with mild headache  History of hemorrhagic CVA in the past  ED noted symptoms correlated with episode of hypotension 92/50 in the ED, BP improved, symptoms improving  CT head and neck no acute intracranial findings  Chronic left-sided weakness, stable, no neurodeficits  Observe for now, neurochecks ordered  Neurology consulted    HFPEF - ECHO 65-70 % OCT/24  Mild shortness of breath noted on exam at bedside  Also mildly tachypneic  Chart review shows treated for pneumonia in October of last year  Check procalcitonin and CRP today  Check CT chest noncontrast to evaluate lung parenchyma  Order COVID and flu test, check BNP    PANKAJ/CKD stage III  Mild low bp   Creatinine 2.31, baseline appears to be 1.2  Will give fluid boluses in the ED as he has no features for pulmonary edema currently  Watch carefully respiratory status  Hold home bp meds /diuretics     Diabetes type 2  Sliding scale insulin started  Premeal glucose checks ordered    Full code            Diet:  Diabetic cardiac  DVT Prophylaxis: Low Risk/Ambulatory with no VTE prophylaxis  indicated  Eugene Catheter: Not present  Lines: None     Cardiac Monitoring: None  Code Status:  Above    Clinically Significant Risk Factors Present on Admission                  # Acute Kidney Injury, unspecified: based on a >150% or 0.3 mg/dL increase in last creatinine compared to past 90 day average, will monitor renal function  # Hypertension: Noted on problem list          # DMII: A1C = 7.2 % (Ref range: <5.7 %) within past 6 months        # Financial/Environmental Concerns:           Disposition Plan     Medically Ready for Discharge: Anticipated in 2-4 Days           Mani Farias MD  Hospitalist Service  [facility]  Securely message with [email or web address] (more info)  Text page via [facility] Paging/Directory     ______________________________________________________________________    Chief Complaint   Chest pain/SOB for 1 week    History is obtained from the patient    History of Present Illness   Sedrick Murray is a 74 year old male who complicated medical history, comes in for evaluation of chest pain for last 1 week which is constant according to him.  Symptoms worsened last couple of days.  Also describes SOB worse with exertion, no PND orthopnea, no leg edema, no cough no fever chills no abdominal symptoms.  Also complains of a headache and blurry vision since early this morning, no neck stiffness no skin rash, no speech normalities, no confusion, no upper or lower extremity weakness.  He does have a history of CVA in the past, hemorrhagic with residual left-sided weakness.    On arrival his BP initially was 102/53, trended lower to 92/50, this improved with no treatment in the ED.  Heart rate 84, O2 sat 96, temperature 98.4.  Labs showed mild troponin elevation, EKG showed inferolateral T wave changes.  Labs showed worsening renal failure.  Chest x-ray was unremarkable.  ED spoke to cardiology who recommend admission, no heparin IV was recommended for now.  CT head showed no acute stroke.   He was given IV fluid boluses in ED.      Past Medical History    Past Medical History:   Diagnosis Date    Anxiety     Back pain     Cerebral vascular accident (H)     Chest pain     Chronic pain     Diarrhea     Dyslipidemia 9/1/2014    Gout     Hemorrhagic stroke (H)     Hypertension        Past Surgical History   Past Surgical History:   Procedure Laterality Date    CHOLECYSTECTOMY      CV CORONARY ANGIOGRAM N/A 4/12/2024    Procedure: Coronary Angiogram;  Surgeon: Phu Delgado MD;  Location: [facility] CATH LAB CV    CV LEFT HEART CATH N/A 4/12/2024    Procedure: Left Heart Catheterization;  Surgeon: Phu Delgado MD;  Location: [facility] CATH LAB CV    IR MISCELLANEOUS PROCEDURE  3/9/2013    IR MISCELLANEOUS PROCEDURE  3/9/2013       Prior to Admission Medications   Prior to Admission Medications   Prescriptions Last Dose Informant Patient Reported? Taking?   VITAMIN D3 25 MCG (1000 UT) tablet  Self Yes No   Sig: Take 25 mcg by mouth daily.   allopurinol (ZYLOPRIM) 300 MG tablet  Self, Spouse/Significant Other Yes No   Sig: Take 300 mg by mouth 2 times daily   amLODIPine (NORVASC) 10 MG tablet  Self, Spouse/Significant Other Yes No   Sig: Take 10 mg by mouth daily   blood glucose (NO BRAND SPECIFIED) lancets standard   No No   Sig: Use to test blood sugar 4 times daily or as directed.   blood glucose monitoring (NO BRAND SPECIFIED) meter device kit   No No   Sig: Use to test blood sugar 4 times daily or as directed.   bumetanide (BUMEX) 1 MG tablet   No No   Sig: Take 1 tablet (1 mg) by mouth daily.   isosorbide mononitrate (IMDUR) 30 MG 24 hr tablet   No No   Sig: Take 1 tablet (30 mg) by mouth daily.   losartan (COZAAR) 100 MG tablet  Self Yes No   Sig: Take 100 mg by mouth daily.   metFORMIN (GLUCOPHAGE) 500 MG tablet   No No   Sig: Take 1 tablet (500 mg) by mouth 2 times daily (with meals).   nitroGLYcerin (NITROSTAT) 0.4 MG sublingual tablet  Self No No   Sig: For chest pain place 1 tablet under the  tongue every 5 minutes for 3 doses. If symptoms persist 5 minutes after 1st dose call 911.   pantoprazole (PROTONIX) 40 MG EC tablet  Self No No   Sig: Take 1 tablet (40 mg) by mouth 2 times daily (before meals)   rosuvastatin (CRESTOR) 40 MG tablet  Self, Spouse/Significant Other Yes No   Sig: Take 40 mg by mouth daily   spironolactone (ALDACTONE) 25 MG tablet   No No   Sig: Take 1 tablet (25 mg) by mouth daily.      Facility-Administered Medications: None        Physical Exam   Vital Signs: Temp: 98.4  F (36.9  C)   BP: 105/54 Pulse: 72   Resp: 24 SpO2: 96 % O2 Device: None (Room air)    Weight: 176 lbs 0 oz    General Appearance: AAOx3  Respiratory: Clear anteriorly, mild tachypnea  Cardiovascular: S1-S2,  GI: Soft nontender nondistended bowel sound heard  Skin: No erythema  Other: No edema bilaterally,  Neurologic moving upper extremities, no facial weakness speech is normal    Medical Decision Making       7 5 MINUTES SPENT BY ME on the date of service doing chart review, history, exam, documentation & further activities per the note.      Data   Imaging results reviewed over the past 24 hrs:   Recent Results (from the past 24 hours)   CT Head w/o Contrast    Narrative    EXAM: CT HEAD W/O CONTRAST  LOCATION: [facility]  DATE: 1/3/2025    INDICATION: Vision changes  COMPARISON: Head CT 11/03/2024.  TECHNIQUE: Routine CT Head without IV contrast. Multiplanar reformats. Dose reduction techniques were used.    FINDINGS:  INTRACRANIAL CONTENTS: No intracranial hemorrhage, extraaxial collection, or mass effect.  No CT evidence of acute infarct. Chronic lacunar infarct in the left basal ganglia. Mild presumed chronic small vessel ischemic changes. Mild generalized volume   loss. No hydrocephalus.     VISUALIZED ORBITS/SINUSES/MASTOIDS: No intraorbital abnormality. No paranasal sinus mucosal disease. No middle ear or mastoid effusion.    BONES/SOFT TISSUES: No acute abnormality.       Impression    IMPRESSION:  1.  No acute intracranial findings.  2.  Mild age-related changes.   XR Chest 2 Views    Narrative    EXAM: XR CHEST 2 VIEWS  LOCATION: [facility]  DATE: 1/3/2025    INDICATION: chest pain  COMPARISON: Chest radiograph 10/17/2024      Impression    IMPRESSION: Bibasilar atelectasis. No pleural effusion or pneumothorax. Stable heart size and mediastinal contours. Degenerative changes of the spine.   CT Chest w/o Contrast    Narrative    EXAM: CT CHEST W/O CONTRAST  LOCATION: [facility]  DATE: 1/3/2025    INDICATION: Chest pain, SOB, recent treatment for pneumonia.  COMPARISON: CT pulmonary angiogram 10/14/2024  TECHNIQUE: CT chest without IV contrast. Multiplanar reformats were obtained. Dose reduction techniques were used.  CONTRAST: None.    FINDINGS:   LUNGS AND PLEURA: There is stable chronic subpleural interstitial change in the mid and lower lungs.    MEDIASTINUM/AXILLAE: No lymphadenopathy. No thoracic aortic aneurysm. Mild cardiac enlargement.    CORONARY ARTERY CALCIFICATION: Severe.    UPPER ABDOMEN: Cholecystectomy.    MUSCULOSKELETAL: Unremarkable.      Impression    IMPRESSION:   1.  Chronic subpleural interstitial change in the mid and lower lungs, likely interstitial lung disease. No focal consolidation.    2.  Coronary artery calcification.    3.  Cholecystectomy.    4.  Heart is mildly enlarged.    5.  Fatty liver.

## 2025-01-04 ENCOUNTER — APPOINTMENT (OUTPATIENT)
Dept: MRI IMAGING | Facility: HOSPITAL | Age: 75
DRG: 315 | End: 2025-01-04
Attending: PSYCHIATRY & NEUROLOGY
Payer: COMMERCIAL

## 2025-01-04 ENCOUNTER — APPOINTMENT (OUTPATIENT)
Dept: CARDIOLOGY | Facility: HOSPITAL | Age: 75
DRG: 315 | End: 2025-01-04
Attending: INTERNAL MEDICINE
Payer: COMMERCIAL

## 2025-01-04 LAB
ANION GAP SERPL CALCULATED.3IONS-SCNC: 11 MMOL/L (ref 7–15)
BUN SERPL-MCNC: 34.7 MG/DL (ref 8–23)
CALCIUM SERPL-MCNC: 8.7 MG/DL (ref 8.8–10.4)
CHLORIDE SERPL-SCNC: 109 MMOL/L (ref 98–107)
CREAT SERPL-MCNC: 1.18 MG/DL (ref 0.67–1.17)
EGFRCR SERPLBLD CKD-EPI 2021: 65 ML/MIN/1.73M2
ERYTHROCYTE [DISTWIDTH] IN BLOOD BY AUTOMATED COUNT: 13.6 % (ref 10–15)
FERRITIN SERPL-MCNC: 641 NG/ML (ref 31–409)
GLUCOSE BLDC GLUCOMTR-MCNC: 123 MG/DL (ref 70–99)
GLUCOSE SERPL-MCNC: 111 MG/DL (ref 70–99)
HCO3 SERPL-SCNC: 20 MMOL/L (ref 22–29)
HCT VFR BLD AUTO: 40.7 % (ref 40–53)
HGB BLD-MCNC: 14 G/DL (ref 13.3–17.7)
LIPASE SERPL-CCNC: 49 U/L (ref 13–60)
LVEF ECHO: NORMAL
MCH RBC QN AUTO: 31.3 PG (ref 26.5–33)
MCHC RBC AUTO-ENTMCNC: 34.4 G/DL (ref 31.5–36.5)
MCV RBC AUTO: 91 FL (ref 78–100)
PLATELET # BLD AUTO: 126 10E3/UL (ref 150–450)
POTASSIUM SERPL-SCNC: 3.9 MMOL/L (ref 3.4–5.3)
RBC # BLD AUTO: 4.47 10E6/UL (ref 4.4–5.9)
SODIUM SERPL-SCNC: 140 MMOL/L (ref 135–145)
WBC # BLD AUTO: 4 10E3/UL (ref 4–11)

## 2025-01-04 PROCEDURE — 82728 ASSAY OF FERRITIN: CPT | Performed by: STUDENT IN AN ORGANIZED HEALTH CARE EDUCATION/TRAINING PROGRAM

## 2025-01-04 PROCEDURE — 93325 DOPPLER ECHO COLOR FLOW MAPG: CPT | Mod: 26 | Performed by: INTERNAL MEDICINE

## 2025-01-04 PROCEDURE — G0378 HOSPITAL OBSERVATION PER HR: HCPCS

## 2025-01-04 PROCEDURE — 83690 ASSAY OF LIPASE: CPT | Performed by: STUDENT IN AN ORGANIZED HEALTH CARE EDUCATION/TRAINING PROGRAM

## 2025-01-04 PROCEDURE — A9585 GADOBUTROL INJECTION: HCPCS | Performed by: PSYCHIATRY & NEUROLOGY

## 2025-01-04 PROCEDURE — 93308 TTE F-UP OR LMTD: CPT | Mod: 26 | Performed by: INTERNAL MEDICINE

## 2025-01-04 PROCEDURE — 80048 BASIC METABOLIC PNL TOTAL CA: CPT | Performed by: INTERNAL MEDICINE

## 2025-01-04 PROCEDURE — 36415 COLL VENOUS BLD VENIPUNCTURE: CPT | Performed by: INTERNAL MEDICINE

## 2025-01-04 PROCEDURE — 70553 MRI BRAIN STEM W/O & W/DYE: CPT

## 2025-01-04 PROCEDURE — 99222 1ST HOSP IP/OBS MODERATE 55: CPT | Performed by: STUDENT IN AN ORGANIZED HEALTH CARE EDUCATION/TRAINING PROGRAM

## 2025-01-04 PROCEDURE — 93325 DOPPLER ECHO COLOR FLOW MAPG: CPT

## 2025-01-04 PROCEDURE — 99233 SBSQ HOSP IP/OBS HIGH 50: CPT | Performed by: STUDENT IN AN ORGANIZED HEALTH CARE EDUCATION/TRAINING PROGRAM

## 2025-01-04 PROCEDURE — 250N000013 HC RX MED GY IP 250 OP 250 PS 637: Performed by: STUDENT IN AN ORGANIZED HEALTH CARE EDUCATION/TRAINING PROGRAM

## 2025-01-04 PROCEDURE — 82310 ASSAY OF CALCIUM: CPT | Performed by: INTERNAL MEDICINE

## 2025-01-04 PROCEDURE — 255N000002 HC RX 255 OP 636: Performed by: PSYCHIATRY & NEUROLOGY

## 2025-01-04 PROCEDURE — 85027 COMPLETE CBC AUTOMATED: CPT | Performed by: INTERNAL MEDICINE

## 2025-01-04 PROCEDURE — 99233 SBSQ HOSP IP/OBS HIGH 50: CPT | Performed by: PSYCHIATRY & NEUROLOGY

## 2025-01-04 PROCEDURE — 82962 GLUCOSE BLOOD TEST: CPT

## 2025-01-04 PROCEDURE — 93321 DOPPLER ECHO F-UP/LMTD STD: CPT | Mod: 26 | Performed by: INTERNAL MEDICINE

## 2025-01-04 PROCEDURE — 250N000013 HC RX MED GY IP 250 OP 250 PS 637: Performed by: INTERNAL MEDICINE

## 2025-01-04 RX ORDER — PANTOPRAZOLE SODIUM 40 MG/1
40 TABLET, DELAYED RELEASE ORAL
Status: DISCONTINUED | OUTPATIENT
Start: 2025-01-04 | End: 2025-01-06 | Stop reason: HOSPADM

## 2025-01-04 RX ORDER — GADOBUTROL 604.72 MG/ML
7 INJECTION INTRAVENOUS ONCE
Status: COMPLETED | OUTPATIENT
Start: 2025-01-04 | End: 2025-01-04

## 2025-01-04 RX ORDER — PANTOPRAZOLE SODIUM 40 MG/1
40 TABLET, DELAYED RELEASE ORAL
Status: DISCONTINUED | OUTPATIENT
Start: 2025-01-04 | End: 2025-01-04

## 2025-01-04 RX ORDER — MAGNESIUM HYDROXIDE/ALUMINUM HYDROXICE/SIMETHICONE 120; 1200; 1200 MG/30ML; MG/30ML; MG/30ML
15 SUSPENSION ORAL ONCE
Status: COMPLETED | OUTPATIENT
Start: 2025-01-04 | End: 2025-01-04

## 2025-01-04 RX ADMIN — ISOSORBIDE MONONITRATE 30 MG: 30 TABLET, EXTENDED RELEASE ORAL at 09:00

## 2025-01-04 RX ADMIN — ALUMINUM HYDROXIDE, MAGNESIUM HYDROXIDE, AND SIMETHICONE 15 ML: 200; 200; 20 SUSPENSION ORAL at 11:18

## 2025-01-04 RX ADMIN — PANTOPRAZOLE SODIUM 40 MG: 40 TABLET, DELAYED RELEASE ORAL at 11:18

## 2025-01-04 RX ADMIN — PANTOPRAZOLE SODIUM 40 MG: 40 TABLET, DELAYED RELEASE ORAL at 16:23

## 2025-01-04 RX ADMIN — ACETAMINOPHEN 650 MG: 325 TABLET ORAL at 16:23

## 2025-01-04 RX ADMIN — ALLOPURINOL 300 MG: 300 TABLET ORAL at 09:00

## 2025-01-04 RX ADMIN — ASPIRIN 81 MG CHEWABLE TABLET 81 MG: 81 TABLET CHEWABLE at 09:00

## 2025-01-04 RX ADMIN — ALLOPURINOL 300 MG: 300 TABLET ORAL at 20:16

## 2025-01-04 RX ADMIN — GADOBUTROL 7 ML: 604.72 INJECTION INTRAVENOUS at 16:55

## 2025-01-04 ASSESSMENT — ACTIVITIES OF DAILY LIVING (ADL)
ADLS_ACUITY_SCORE: 57
DOING_ERRANDS_INDEPENDENTLY_DIFFICULTY: YES
ADLS_ACUITY_SCORE: 57
DRESSING/BATHING_DIFFICULTY: YES
ADLS_ACUITY_SCORE: 57
ADLS_ACUITY_SCORE: 51
ADLS_ACUITY_SCORE: 51
ADLS_ACUITY_SCORE: 57
ADLS_ACUITY_SCORE: 57
WEAR_GLASSES_OR_BLIND: NO
CONCENTRATING,_REMEMBERING_OR_MAKING_DECISIONS_DIFFICULTY: NO
FALL_HISTORY_WITHIN_LAST_SIX_MONTHS: YES
ADLS_ACUITY_SCORE: 51
ADLS_ACUITY_SCORE: 57
ADLS_ACUITY_SCORE: 51
ADLS_ACUITY_SCORE: 57
ADLS_ACUITY_SCORE: 57
ADLS_ACUITY_SCORE: 51
ADLS_ACUITY_SCORE: 57
ADLS_ACUITY_SCORE: 51
CHANGE_IN_FUNCTIONAL_STATUS_SINCE_ONSET_OF_CURRENT_ILLNESS/INJURY: NO
ADLS_ACUITY_SCORE: 57
TOILETING_ISSUES: NO
ADLS_ACUITY_SCORE: 57
ADLS_ACUITY_SCORE: 57
WALKING_OR_CLIMBING_STAIRS: AMBULATION DIFFICULTY, ASSISTANCE 1 PERSON
ADLS_ACUITY_SCORE: 57
ADLS_ACUITY_SCORE: 51
DRESSING/BATHING: BATHING DIFFICULTY, ASSISTANCE 1 PERSON
ADLS_ACUITY_SCORE: 57
WALKING_OR_CLIMBING_STAIRS_DIFFICULTY: YES
EQUIPMENT_CURRENTLY_USED_AT_HOME: CANE, STRAIGHT;WALKER, ROLLING
ADLS_ACUITY_SCORE: 57
ADLS_ACUITY_SCORE: 57
DIFFICULTY_EATING/SWALLOWING: NO

## 2025-01-04 NOTE — PLAN OF CARE
Goal Outcome Evaluation:      Plan of Care Reviewed With: patient, spouse          Outcome Evaluation: pt receiving echo at this time. has been comfortable with 1 liter oxygen on for comfort. b/p's stable. md updated of room change & report phoned to RN handoff

## 2025-01-04 NOTE — CONSULTS
[facility] [address], [place] [address]   [email or web address]   OFFICE: [phone]     CARDIOLOGY INPATIENT CONSULT NOTE     Impression and Plan     Assessment/Plan:  Mr. Sedrick Murray is a 74 year old male with CAD managed medically, HFpEF, HTN, mod-severe AI, and prior hemorrhagic CVA in 2017 who presented to the hospital with dizziness and chest pain.    Chest pain   - He has known stenosis on [facility] however this was not intervened on due to high risk of bleeding given hx of hemorrhagic CVA.  His pain currently is more suggestive of GI related pain such as GERD or ulcer rather than cardiac angina.  Will do a trial of GI cocktail and PPI   - Continue imdur   - Mildly elevated troponin with a plateau trend not consistent with ACS.  Likely supply/demand mismatch related to PANKAJ/dehydration and known underlying CAD.   - At this point no plans to repeat ischemic testing.  If it was felt he would benefit from PCI this would be planned on an outpt basis due to current PANKAJ.  Pt and family agree with plan.     Dizziness   - Likely hypotension and dehydration related to poor PO intake   - Encourage PO intake, hydration    Will continue to follow  He is not currently following with cardiology.  He may follow with me on discharge from the hospital.    Primary Cardiologist: Not established    History of Present Illness      Mr. Sedrick Murray is a 74 year old male with CAD managed medically, HFpEF, HTN, mod-severe AI, and prior hemorrhagic CVA in 2017 who presented to the hospital with dizziness and chest pain.  The patient notes the pain has been ongoing for a few days.  He gets worsening pain/tightness when he eats so has been avoiding it. . Pain also worsens when laying flat.  He dos also get worsening pian with exertion.  HE notes pain was constant since it started and is similar to piror admissions.  In between admissions he generally feels well but will get these  flares in between.          Review of Systems:  Further review of systems is otherwise negative/noncontributory (based on review of medical record (admission H&P) and 13 point review of systems reviewed. Pertinent positives noted).    Cardiac Diagnostics     ECG: Personally reviewed and interpreted: 1/3/2025  NSR  Inferolateral TWI    Telemetry (personally reviewed): NSR    Most recent:  Echocardiogram (results reviewed): 10/14/2024  Interpretation Summary     The left ventricle is normal in size. There is mild to moderate concentric  left ventricular hypertrophy.  Hyperdynamic left ventricular function The visual ejection fraction is 65-70%.  No regional wall motion abnormalities noted.     The right ventricle is normal in size and function.  Normal left atrial size. Right atrial size is normal.  There is moderate (2+) aortic regurgitation.  IVC diameter <2.1 cm collapsing >50% with sniff suggests a normal RA pressure  of 3 mmHg.  Compared to prior study, there is no significant change.    Cardiac Cath (results reviewed): 4/12/2024    Dist LAD lesion is 80% stenosed.    Prox LAD to Mid LAD lesion is 20% stenosed.    Mid LAD lesion is 50% stenosed.    1st Diag lesion is 30% stenosed.    Mid Cx to Dist Cx lesion is 30% stenosed.    RPDA lesion is 40% stenosed.    Prox RCA to Dist RCA lesion is 15% stenosed.      Medical History  Surgical History Family History Social History   Past Medical History:   Diagnosis Date    Anxiety     Back pain     Cerebral vascular accident (H)     Chest pain     Chronic pain     Diarrhea     Dyslipidemia 9/1/2014    Gout     Hemorrhagic stroke (H)     Hypertension      Past Surgical History:   Procedure Laterality Date    CHOLECYSTECTOMY      CV CORONARY ANGIOGRAM N/A 4/12/2024    Procedure: Coronary Angiogram;  Surgeon: Phu Delgado MD;  Location: [facility] CATH LAB CV    CV LEFT HEART CATH N/A 4/12/2024    Procedure: Left Heart Catheterization;  Surgeon: Phu Delgado MD;   Location: [facility] LAB CV    IR MISCELLANEOUS PROCEDURE  3/9/2013    IR MISCELLANEOUS PROCEDURE  3/9/2013     Family History   Problem Relation Age of Onset    Cerebrovascular Disease Mother            Social History     Socioeconomic History    Marital status:      Spouse name: Not on file    Number of children: Not on file    Years of education: Not on file    Highest education level: Not on file   Occupational History    Not on file   Tobacco Use    Smoking status: Never    Smokeless tobacco: Not on file   Substance and Sexual Activity    Alcohol use: Yes    Drug use: No    Sexual activity: Not on file   Other Topics Concern    Not on file   Social History Narrative    Patient presented to the ED with his wife 04/25/17       Social Drivers of Health     Financial Resource Strain: Low Risk  (10/17/2024)    Financial Resource Strain     Within the past 12 months, have you or your family members you live with been unable to get utilities (heat, electricity) when it was really needed?: No   Food Insecurity: Low Risk  (10/17/2024)    Food Insecurity     Within the past 12 months, did you worry that your food would run out before you got money to buy more?: No     Within the past 12 months, did the food you bought just not last and you didn t have money to get more?: No   Transportation Needs: Low Risk  (10/17/2024)    Transportation Needs     Within the past 12 months, has lack of transportation kept you from medical appointments, getting your medicines, non-medical meetings or appointments, work, or from getting things that you need?: No   Physical Activity: Not on file   Stress: Not on file   Social Connections: Unknown (4/19/2023)    Received from [facility]    Social Connections     Frequency of Communication with Friends and Family: Not on file   Interpersonal Safety: Low Risk  (10/14/2024)    Interpersonal Safety     Do  you feel physically and emotionally safe where you currently live?: Yes     Within the past 12 months, have you been hit, slapped, kicked or otherwise physically hurt by someone?: No     Within the past 12 months, have you been humiliated or emotionally abused in other ways by your partner or ex-partner?: No   Housing Stability: Low Risk  (10/17/2024)    Housing Stability     Do you have housing? : Yes     Are you worried about losing your housing?: No             Physical Examination   VITALS: /59   Pulse 66   Temp 98  F (36.7  C) (Oral)   Resp 22   Wt 79.8 kg (176 lb)   SpO2 95%   BMI 31.18 kg/m    BMI: Body mass index is 31.18 kg/m .  Wt Readings from Last 3 Encounters:   01/03/25 79.8 kg (176 lb)   11/03/24 81.2 kg (179 lb)   10/21/24 80.1 kg (176 lb 9.4 oz)       Intake/Output Summary (Last 24 hours) at 1/4/2025 1200  Last data filed at 1/4/2025 0401  Gross per 24 hour   Intake --   Output 300 ml   Net -300 ml       General: pleasant male. No acute distress.   HENT: external ears normal. Nares patent. Mucous membranes moist.  Neck: No JVD  Lungs: clear to auscultation  COR:  regular rate and rhythm, No murmurs, rubs, or gallops  Abd: nondistended, BS present  Extrem: No edema          Lab Results Reviewed    Chemistry/lipid CBC Cardiac Enzymes/BNP/TSH/INR   Recent Labs   Lab Test 04/11/24  1215   CHOL 210*   HDL 80   LDL 61   TRIG 343*     Recent Labs   Lab Test 04/11/24  1215 05/29/17  0623 03/17/17  0447   LDL 61 73 120     Recent Labs   Lab Test 01/04/25  0815 01/04/25  0704   NA  --  140   POTASSIUM  --  3.9   CHLORIDE  --  109*   CO2  --  20*   * 111*   BUN  --  34.7*   CR  --  1.18*   GFRESTIMATED  --  65   SAHARA  --  8.7*     Recent Labs   Lab Test 01/04/25  0704 01/03/25  1433 11/03/24  2047   CR 1.18* 2.31* 1.02     Recent Labs   Lab Test 10/14/24  0657 04/25/24 2026 03/17/17  0447   A1C 7.2* 7.4* 5.8          Recent Labs   Lab Test 01/04/25  0704   WBC 4.0   HGB 14.0   HCT 40.7   MCV  91   *     Recent Labs   Lab Test 01/04/25  0704 01/03/25  1433 11/03/24  2047   HGB 14.0 15.5 13.3    Recent Labs   Lab Test 05/06/20  0520 05/05/20  2148   TROPONINI 0.03 0.03     Recent Labs   Lab Test 01/03/25  1653 10/13/24  2354 04/25/24 2026   NTBNPI 56 504 308     Recent Labs   Lab Test 05/05/20  2148   TSH 1.06     Recent Labs   Lab Test 04/11/24  1216 05/05/20  2148   INR 0.99 0.98           Current Inpatient Scheduled Medications   Scheduled Meds:  Current Facility-Administered Medications   Medication Dose Route Frequency Provider Last Rate Last Admin    allopurinol (ZYLOPRIM) tablet 300 mg  300 mg Oral BID Mani Fraias MD   300 mg at 01/04/25 0900    [Held by provider] amLODIPine (NORVASC) tablet 10 mg  10 mg Oral Mani Delarosa MD        aspirin (ASA) chewable tablet 81 mg  81 mg Oral Mani Delarosa MD   81 mg at 01/04/25 0900    [Held by provider] bumetanide (BUMEX) tablet 1 mg  1 mg Oral Daily Mani Farias MD        isosorbide mononitrate (IMDUR) 24 hr tablet 30 mg  30 mg Oral Mani Delarosa MD   30 mg at 01/04/25 0900    [Held by provider] losartan (COZAAR) tablet 100 mg  100 mg Oral Mani Delarosa MD        [Held by provider] metFORMIN (GLUCOPHAGE) tablet 500 mg  500 mg Oral BID w/meals Mani Farias MD        pantoprazole (PROTONIX) EC tablet 40 mg  40 mg Oral QAM AC Cain, Marcel, DO   40 mg at 01/04/25 1118    sodium chloride (PF) 0.9% PF flush 3 mL  3 mL Intracatheter Q8H Mani Farias MD   3 mL at 01/04/25 1120     Continuous Infusions:  Current Facility-Administered Medications   Medication Dose Route Frequency Provider Last Rate Last Admin       Current Outpatient Medications   Medication Sig Dispense Refill    allopurinol (ZYLOPRIM) 300 MG tablet Take 300 mg by mouth 2 times daily      amLODIPine (NORVASC) 10 MG tablet Take 10 mg by mouth every morning.      ASPIRIN LOW DOSE 81 MG chewable tablet Take 81 mg by mouth every morning.       bumetanide (BUMEX) 1 MG tablet Take 1 tablet (1 mg) by mouth daily. 30 tablet 1    isosorbide mononitrate (IMDUR) 30 MG 24 hr tablet Take 30 mg by mouth every morning.      losartan (COZAAR) 100 MG tablet Take 100 mg by mouth every morning.      metFORMIN (GLUCOPHAGE) 500 MG tablet Take 500 mg by mouth 2 times daily (with meals).      nitroGLYcerin (NITROSTAT) 0.4 MG sublingual tablet For chest pain place 1 tablet under the tongue every 5 minutes for 3 doses. If symptoms persist 5 minutes after 1st dose call 911. 20 tablet 3    VITAMIN D3 25 MCG (1000 UT) tablet Take 25 mcg by mouth every morning.            Medications Prior to Admission   Prior to Admission medications    Medication Sig Start Date End Date Taking? Authorizing Provider   allopurinol (ZYLOPRIM) 300 MG tablet Take 300 mg by mouth 2 times daily   Yes Reported, Patient   amLODIPine (NORVASC) 10 MG tablet Take 10 mg by mouth every morning. 1/8/24  Yes Reported, Patient   ASPIRIN LOW DOSE 81 MG chewable tablet Take 81 mg by mouth every morning. 4/2/24  Yes Reported, Patient   bumetanide (BUMEX) 1 MG tablet Take 1 tablet (1 mg) by mouth daily. 10/28/24  Yes Rod Chanel MD   isosorbide mononitrate (IMDUR) 30 MG 24 hr tablet Take 30 mg by mouth every morning.   Yes Reported, Patient   losartan (COZAAR) 100 MG tablet Take 100 mg by mouth every morning. 9/23/24  Yes Reported, Patient   metFORMIN (GLUCOPHAGE) 500 MG tablet Take 500 mg by mouth 2 times daily (with meals).   Yes Reported, Patient   nitroGLYcerin (NITROSTAT) 0.4 MG sublingual tablet For chest pain place 1 tablet under the tongue every 5 minutes for 3 doses. If symptoms persist 5 minutes after 1st dose call 911. 4/29/24  Yes Cleveland Mercado DO   VITAMIN D3 25 MCG (1000 UT) tablet Take 25 mcg by mouth every morning. 9/23/24  Yes Reported, Patient          Marcel Barlow DO [facility]  Non-invasive Cardiologist  [facility]      Clinically Significant Risk Factors Present on  Admission          # Hyperchloremia: Highest Cl = 109 mmol/L in last 2 days, will monitor as appropriate            # Drug Induced Platelet Defect: home medication list includes an antiplatelet medication   # Hypertension: Noted on problem list          # DMII: A1C = 7.2 % (Ref range: <5.7 %) within past 6 months        # Financial/Environmental Concerns:

## 2025-01-04 NOTE — PROGRESS NOTES
Patient admitted to room 15 at approximately 1522 via wheel chair from emergency room.  Reason for Admission: blurred vision  Report received from: ED RN  Patient was accompanied by Spouse.  Discharge transportation provided by:  Patient ambulated/transferred:  with one assist. self.  Patient is alert and orientated x 3.  Outpatient Observation education provided to: patient  MDRO Education done if applicable (MRSA, VRE, etc)  Safety risks were identified during admission:  fall.   Yellow risk/fall band applied:  Yes  Home meds sent home: No  Home meds sent to pharmacy:No IF YES add 1/2 sheet laminated page reminder to chart/clipboard   Detailed Belongings: clothing, shoes, cell phone & , wallet w/ $204 (spouse advised to take cash home as they do not want security to secure cash).

## 2025-01-04 NOTE — PROGRESS NOTES
[facility]    Medicine Progress Note - Hospitalist Service    Date of Admission:  1/3/2025    Assessment & Plan   Sedrick Murray is a 74 year old male history of CAD, CKD stage III, diabetes, who presents for evaluation of abdominal pain and positional lightheadedness. Stable here without emergent findings.    #Acute epigastric pain  Reports 1 week of epigastric/lower chest pain that worsens with eating, and exertion. On exam, he has TTP of the epigastrium and LUQ which he says is the pain he's here for. HS trop was flat ~50 on admission. Cardiology evaluated, patient has known obstructive CAD with history of hemorrhagic CVA has limited interventions. This pain seems more likely to be GI in nature and less likely cardiac given the TTP of the abdomen. The patient is not anemic. Would treat as GERD and plan for outpatient GI evaluation.  - Appreciate cardiology eval  - Continue ASA 81mg daily  - Start Imdur 30mg daily  - Start pantoprazole 40mg PO BID  - OP GI referral for possible EGD  - Added on a lipase that was normal  - Check ferritin for signs of iron deficiency  - H. Pylori stool antigen ordered if he has a bowel movement    #Positional lightheadedness  Patient describes a week of positional lightheadedness. No syncope or falls. On admission he was noted to have a pre-renal PANKAJ that improved with IV hydration. BP was 92/50. Suspect that he has been dehydrated from poor PO intake due to his above abdominal pain. In addition, he is on high dose antihypertensives and a diuretic.  - S/p IVF  - Encourage patient to get up and walk as he's able  - Hold home amlodipine, losartan, Bumex  - Will observe another night for this, can likely go home tomorrow, 1/5    #PANKAJ on CKD 2  Cr 2.3 on admission from baseline of 1-1.2. Returned to baseline with 1L IVF.  - Monitor  - Losartan held as above    #Primary HTN  BP 92/50 when he arrived in the ED.  - Hold home amlodipine, losartan as above  - Doing well  off these, monitor BP and consider reducing regimen at discharge, especially with addition of Imdur    #History of hemorrhagic CVA  CT head and neck no acute intracranial findings. Chronic left-sided weakness, stable.    #Headache  Report of a brief headache in the ED with some blurry vision. BP was low on admission which may have precipitated this. This has resolved.    #Chronic HFpEF without acute exacerbation  BNP normal. No peripheral edema. No hypoxemia. Dry on admission with an PANKAJ responsive to IVF.  - Hold home Bumex as above for dehyration    #Diabetes type 2  - Hold home metformin  - Sliding scale insulin started    #Gout  - Continue home allopurinol    #Incidental lung fibrosis  Noted on CT to have chronic subpleural interstitial changes, likely ILD.  - Pulmonology referral            Diet: Combination Diet Low Saturated Fat Na <2400mg Diet, No Caffeine Diet    DVT Prophylaxis: Pneumatic Compression Devices  Eugene Catheter: Not present  Lines: None     Cardiac Monitoring: ACTIVE order. Indication: Chest pain/ ACS rule out (24 hours)  Code Status: Full Code      Clinically Significant Risk Factors Present on Admission          # Hyperchloremia: Highest Cl = 109 mmol/L in last 2 days, will monitor as appropriate            # Drug Induced Platelet Defect: home medication list includes an antiplatelet medication   # Hypertension: Noted on problem list          # DMII: A1C = 7.2 % (Ref range: <5.7 %) within past 6 months        # Financial/Environmental Concerns:           Social Drivers of Health    Tobacco Use: Unknown (1/3/2025)    Patient History     Smoking Tobacco Use: Never     Smokeless Tobacco Use: Unknown    [facility], [facility]    Social Connections          Disposition Plan     Medically Ready for Discharge: Anticipated Tomorrow             RENETTA BERMUDEZ MD  Hospitalist Service  [facility]  Hospital  Securely message with [employer] (more info)  Text page via [facility] Paging/Directory   ______________________________________________________________________    Interval History   The patient is feeling about the same. Has upper abdominal pain.    Physical Exam   Vital Signs: Temp: 98  F (36.7  C) Temp src: Oral BP: 106/59 Pulse: 66   Resp: 22 SpO2: 95 % O2 Device: None (Room air)    Weight: 176 lbs 0 oz    General: No overt distress, conversational, non-toxic appearing  HEENT: MMM  Pulmonary: CTAB; no crackles, wheezing, or rhonchi, normal effort  Cardiac: RRR. No m/r/g  Abdomen: Soft. ND. TTP of the epigastrium and LUQ without rebound or guarding  Extremities: No bilateral LE edema  Neuro: alert and awake, Ox4      Medical Decision Making       55 MINUTES SPENT BY ME on the date of service doing chart review, history, exam, documentation & further activities per the note.      Data     I have personally reviewed the following data over the past 24 hrs:    4.0  \   14.0   / 126 (L)     140 109 (H) 34.7 (H) /  123 (H)   3.9 20 (L) 1.18 (H) \     Trop: 46 (H) BNP: 56     Procal: 0.22 CRP: N/A Lactic Acid: N/A         Imaging results reviewed over the past 24 hrs:   Recent Results (from the past 24 hours)   CT Head w/o Contrast    Narrative    EXAM: CT HEAD W/O CONTRAST  LOCATION: [facility]  DATE: 1/3/2025    INDICATION: Vision changes  COMPARISON: Head CT 11/03/2024.  TECHNIQUE: Routine CT Head without IV contrast. Multiplanar reformats. Dose reduction techniques were used.    FINDINGS:  INTRACRANIAL CONTENTS: No intracranial hemorrhage, extraaxial collection, or mass effect.  No CT evidence of acute infarct. Chronic lacunar infarct in the left basal ganglia. Mild presumed chronic small vessel ischemic changes. Mild generalized volume   loss. No hydrocephalus.     VISUALIZED ORBITS/SINUSES/MASTOIDS: No intraorbital abnormality. No paranasal sinus mucosal disease. No middle ear or mastoid  effusion.    BONES/SOFT TISSUES: No acute abnormality.      Impression    IMPRESSION:  1.  No acute intracranial findings.  2.  Mild age-related changes.   XR Chest 2 Views    Narrative    EXAM: XR CHEST 2 VIEWS  LOCATION: [facility]  DATE: 1/3/2025    INDICATION: chest pain  COMPARISON: Chest radiograph 10/17/2024      Impression    IMPRESSION: Bibasilar atelectasis. No pleural effusion or pneumothorax. Stable heart size and mediastinal contours. Degenerative changes of the spine.   CT Chest w/o Contrast    Narrative    EXAM: CT CHEST W/O CONTRAST  LOCATION: [facility]  DATE: 1/3/2025    INDICATION: Chest pain, SOB, recent treatment for pneumonia.  COMPARISON: CT pulmonary angiogram 10/14/2024  TECHNIQUE: CT chest without IV contrast. Multiplanar reformats were obtained. Dose reduction techniques were used.  CONTRAST: None.    FINDINGS:   LUNGS AND PLEURA: There is stable chronic subpleural interstitial change in the mid and lower lungs.    MEDIASTINUM/AXILLAE: No lymphadenopathy. No thoracic aortic aneurysm. Mild cardiac enlargement.    CORONARY ARTERY CALCIFICATION: Severe.    UPPER ABDOMEN: Cholecystectomy.    MUSCULOSKELETAL: Unremarkable.      Impression    IMPRESSION:   1.  Chronic subpleural interstitial change in the mid and lower lungs, likely interstitial lung disease. No focal consolidation.    2.  Coronary artery calcification.    3.  Cholecystectomy.    4.  Heart is mildly enlarged.    5.  Fatty liver.

## 2025-01-04 NOTE — PLAN OF CARE
Problem: Adult Inpatient Plan of Care  Goal: Plan of Care Review  Description: The Plan of Care Review/Shift note should be completed every shift.  The Outcome Evaluation is a brief statement about your assessment that the patient is improving, declining, or no change.  This information will be displayed automatically on your shift  note.  Outcome: Progressing   Goal Outcome Evaluation:  Pt came in with blurry vision, chest pain. No complains of chest paain, nor blurry vision at this time. Family in the room to help translate.

## 2025-01-04 NOTE — PROGRESS NOTES
Pt resting. Appetite good, was given maalox and pepcid mid morning for epigastric area discomfort with comfort relief. Rhythm remains sinus.

## 2025-01-04 NOTE — CONSULTS
NEUROLOGY CONSULTATION NOTE     Sedrick Murray,  1950, MRN [id number] Date: 2025     [facility]   Code status:  Full Code   PCP: Milvia Costa, [phone]      ASSESSMENT & PLAN   Diagnosis code: Headache    Headache with blurred vision and dizziness  Chest pain    Head CT negative for acute structural lesions  Check MRI brain to evaluate for neurological cause  Treatment of headache with Tylenol.  IV fluids/supportive care.  Patient's blood pressure is low which can sometimes cause headaches as well- ?  Presyncope  Cardiology involved for management of chest pain       Chief Complaint   Patient presents with    Chest Pain        HISTORY OF PRESENT ILLNESS     We have been requested by dr. Farias to evaluate Sedrick Murray who is a 74 year old  male for evaluation of headaches/blurred vision.  This is a 74-year-old male with history of hemorrhagic stroke, chest pain, anxiety, dyslipidemia, gout who presented with some chest pain radiating to his head causing a headache.  He also complains of associated blurred vision in both eyes.  This is more like blurred vision than loss of vision.  He further complains of room spinning dizziness.  He has had similar symptoms in the past.  Cardiology has evaluated him for chest pain due to history of coronary artery disease.  Troponin was mildly elevated.  Overall they are not concerned about ACS.  Headache is slowly getting better.     PAST MEDICAL & SURGICAL HISTORY     Medical History  Past Medical History:   Diagnosis Date    Anxiety     Back pain     Cerebral vascular accident (H)     Chest pain     Chronic pain     Diarrhea     Dyslipidemia 2014    Gout     Hemorrhagic stroke (H)     Hypertension      Surgical History  Past Surgical History:   Procedure Laterality Date    CHOLECYSTECTOMY      CV CORONARY ANGIOGRAM N/A 2024    Procedure: Coronary Angiogram;  Surgeon: Phu Delgado MD;  Location: [facility] CATH LAB CV    CV LEFT HEART CATH N/A  4/12/2024    Procedure: Left Heart Catheterization;  Surgeon: Phu Delgado MD;  Location: [facility] CATH LAB CV    IR MISCELLANEOUS PROCEDURE  3/9/2013    IR MISCELLANEOUS PROCEDURE  3/9/2013        SOCIAL HISTORY     Social History     Tobacco Use    Smoking status: Never   Substance Use Topics    Alcohol use: Yes    Drug use: No        FAMILY HISTORY     Reviewed, and family history includes Cerebrovascular Disease in his mother.     ALLERGIES     Allergies   Allergen Reactions    Dilaudid [Hydromorphone] Unknown    Phenytoin Hives and Itching        REVIEW OF SYSTEMS     12 system ROS was done within the HPI this was negative.  Pertinent positives noted in the HPI.     HOME & HOSPITAL MEDICATIONS     Prior to Admission Medications  (Not in a hospital admission)      Hospital Medications  Current Facility-Administered Medications   Medication Dose Route Frequency Provider Last Rate Last Admin    allopurinol (ZYLOPRIM) tablet 300 mg  300 mg Oral BID Mani Farias MD   300 mg at 01/04/25 0900    [Held by provider] amLODIPine (NORVASC) tablet 10 mg  10 mg Oral Mani Delarosa MD        aspirin (ASA) chewable tablet 81 mg  81 mg Oral Mani Delarosa MD   81 mg at 01/04/25 0900    [Held by provider] bumetanide (BUMEX) tablet 1 mg  1 mg Oral Daily Mani Farias MD        isosorbide mononitrate (IMDUR) 24 hr tablet 30 mg  30 mg Oral Mani Delarosa MD   30 mg at 01/04/25 0900    [Held by provider] losartan (COZAAR) tablet 100 mg  100 mg Oral Mani Delarosa MD        [Held by provider] metFORMIN (GLUCOPHAGE) tablet 500 mg  500 mg Oral BID w/meals Mnai Farias MD        pantoprazole (PROTONIX) EC tablet 40 mg  40 mg Oral BID AC Johnny Wright MD        sodium chloride (PF) 0.9% PF flush 3 mL  3 mL Intracatheter Q8H Mani Farias MD   3 mL at 01/04/25 1120        PHYSICAL EXAM     Vital signs  Temp:  [97.8  F (36.6  C)-98.1  F (36.7  C)] 98  F (36.7  C)  Pulse:  [66-77]  66  Resp:  [17-34] 22  BP: ()/(45-72) 106/59  SpO2:  [95 %-97 %] 95 %    PHYSICAL EXAMINATION  VITALS: /59   Pulse 66   Temp 98  F (36.7  C) (Oral)   Resp 22   Wt 79.8 kg (176 lb)   SpO2 95%   BMI 31.18 kg/m    GENERAL -Health appearing, No apparent distress  EYES- No scleral icterus, no eyelid droop, Pupils - see Neuro section  HEENT - Normocephalic, atraumatic, Hearing grossly intact; Oral mucosa moist and pink in color. External Ears and nose intact.   Neck - supple   PULM - Good spontaneous respiratory effort;   CV-extremities warm and well-perfused.  MSK- Gait - see Neuro section; Strength and tone- see Neuro section; Range of motion grossly intact.  PSYCH -cooperative    Neurological  Mental status - Patient is awake and grossly oriented to self, place and time. Attention span is normal. Language is fluent and follows commands appropriately.   Cranial nerves - CN II-XII intact. Pupils are reactive and symmetric; EOMI, VFIT, NLF symmetric  Motor - There is no pronator drift. Motor exam shows 5/5 strength in all extremities.  Tone - Tone is symmetric bilaterally in upper and lower extremities.  Reflexes - Reflexes are symmetric/decreased.  Sensation - Sensory exam is grossly intact to light touch, pain.  Coordination - Finger to nose and heel to shin is without dysmetria.  Gait and station --needs assistance to ambulate.  Formal gait testing cannot be done due to safety concerns from ongoing medical issues       DIAGNOSTIC STUDIES     Pertinent Radiology   CT head  IMPRESSION:  1.  No acute intracranial findings.  2.  Mild age-related changes.    Carotid US 2023  IMPRESSION:    1. Based on the ICA velocities, ICA/CCA ratio, and 2D images there is plaque causing <50% stenosis in the right internal carotid artery and there is plaque causing <50% stenosis in the left internal carotid artery.    2. Normal antegrade flow in the right vertebral artery and No identifiable flow in the left vertebral  artery.    3. Multiphasic flow within bilateral subclavian arteries.    4. Right vertebral artery flow: Antegrade       No arterial flow could be detected in the region of the left vertebral artery.     Recent Results (from the past 24 hours)   Troponin T, High Sensitivity    Collection Time: 01/03/25  4:53 PM   Result Value Ref Range    Troponin T, High Sensitivity 46 (H) <=22 ng/L   Nt probnp inpatient    Collection Time: 01/03/25  4:53 PM   Result Value Ref Range    N terminal Pro BNP Inpatient 56 0 - 900 pg/mL   Procalcitonin    Collection Time: 01/03/25  4:53 PM   Result Value Ref Range    Procalcitonin 0.22 <0.50 ng/mL   Influenza A/B, RSV and SARS-CoV2 PCR (COVID-19) Nasopharyngeal    Collection Time: 01/03/25  6:44 PM    Specimen: Nasopharyngeal; Swab   Result Value Ref Range    Influenza A PCR Negative Negative    Influenza B PCR Negative Negative    RSV PCR Negative Negative    SARS CoV2 PCR Negative Negative   Basic metabolic panel    Collection Time: 01/04/25  7:04 AM   Result Value Ref Range    Sodium 140 135 - 145 mmol/L    Potassium 3.9 3.4 - 5.3 mmol/L    Chloride 109 (H) 98 - 107 mmol/L    Carbon Dioxide (CO2) 20 (L) 22 - 29 mmol/L    Anion Gap 11 7 - 15 mmol/L    Urea Nitrogen 34.7 (H) 8.0 - 23.0 mg/dL    Creatinine 1.18 (H) 0.67 - 1.17 mg/dL    GFR Estimate 65 >60 mL/min/1.73m2    Calcium 8.7 (L) 8.8 - 10.4 mg/dL    Glucose 111 (H) 70 - 99 mg/dL   CBC with platelets    Collection Time: 01/04/25  7:04 AM   Result Value Ref Range    WBC Count 4.0 4.0 - 11.0 10e3/uL    RBC Count 4.47 4.40 - 5.90 10e6/uL    Hemoglobin 14.0 13.3 - 17.7 g/dL    Hematocrit 40.7 40.0 - 53.0 %    MCV 91 78 - 100 fL    MCH 31.3 26.5 - 33.0 pg    MCHC 34.4 31.5 - 36.5 g/dL    RDW 13.6 10.0 - 15.0 %    Platelet Count 126 (L) 150 - 450 10e3/uL   Lipase    Collection Time: 01/04/25  7:04 AM   Result Value Ref Range    Lipase 49 13 - 60 U/L   Glucose by meter    Collection Time: 01/04/25  8:15 AM   Result Value Ref Range     GLUCOSE BY METER POCT 123 (H) 70 - 99 mg/dL       Total time spent for face to face visit, reviewing labs/imaging studies, counseling and coordination of care was: Over 80 min More than 50% of this time was spent on counseling and coordination of care.    Counseling patient.  Reviewing chart.  Patient new to me.    Mika Soler MD  Neurologist  [facility] Neurology [facility]  Tel:- [phone]

## 2025-01-04 NOTE — PROGRESS NOTES
Pt reported through  that he still had chest discomfort. Rhythm stable, blood pressure stable. Did receive imdur as ordered. Up with stand by assist with spouse in room.

## 2025-01-05 PROBLEM — I25.10 CORONARY ARTERY DISEASE DUE TO LIPID RICH PLAQUE: Status: ACTIVE | Noted: 2025-01-05

## 2025-01-05 PROBLEM — I25.83 CORONARY ARTERY DISEASE DUE TO LIPID RICH PLAQUE: Status: ACTIVE | Noted: 2025-01-05

## 2025-01-05 LAB
ANION GAP SERPL CALCULATED.3IONS-SCNC: 8 MMOL/L (ref 7–15)
BUN SERPL-MCNC: 22.2 MG/DL (ref 8–23)
CALCIUM SERPL-MCNC: 8.6 MG/DL (ref 8.8–10.4)
CHLORIDE SERPL-SCNC: 108 MMOL/L (ref 98–107)
CREAT SERPL-MCNC: 1 MG/DL (ref 0.67–1.17)
EGFRCR SERPLBLD CKD-EPI 2021: 79 ML/MIN/1.73M2
ERYTHROCYTE [DISTWIDTH] IN BLOOD BY AUTOMATED COUNT: 13.5 % (ref 10–15)
GLUCOSE BLDC GLUCOMTR-MCNC: 129 MG/DL (ref 70–99)
GLUCOSE SERPL-MCNC: 105 MG/DL (ref 70–99)
HCO3 SERPL-SCNC: 22 MMOL/L (ref 22–29)
HCT VFR BLD AUTO: 41.3 % (ref 40–53)
HGB BLD-MCNC: 13.9 G/DL (ref 13.3–17.7)
MCH RBC QN AUTO: 30.9 PG (ref 26.5–33)
MCHC RBC AUTO-ENTMCNC: 33.7 G/DL (ref 31.5–36.5)
MCV RBC AUTO: 92 FL (ref 78–100)
PLATELET # BLD AUTO: 119 10E3/UL (ref 150–450)
POTASSIUM SERPL-SCNC: 3.9 MMOL/L (ref 3.4–5.3)
RBC # BLD AUTO: 4.5 10E6/UL (ref 4.4–5.9)
SODIUM SERPL-SCNC: 138 MMOL/L (ref 135–145)
WBC # BLD AUTO: 4.1 10E3/UL (ref 4–11)

## 2025-01-05 PROCEDURE — G0378 HOSPITAL OBSERVATION PER HR: HCPCS

## 2025-01-05 PROCEDURE — 250N000013 HC RX MED GY IP 250 OP 250 PS 637: Performed by: INTERNAL MEDICINE

## 2025-01-05 PROCEDURE — 99233 SBSQ HOSP IP/OBS HIGH 50: CPT | Performed by: INTERNAL MEDICINE

## 2025-01-05 PROCEDURE — 250N000013 HC RX MED GY IP 250 OP 250 PS 637: Performed by: PSYCHIATRY & NEUROLOGY

## 2025-01-05 PROCEDURE — 36415 COLL VENOUS BLD VENIPUNCTURE: CPT | Performed by: STUDENT IN AN ORGANIZED HEALTH CARE EDUCATION/TRAINING PROGRAM

## 2025-01-05 PROCEDURE — 120N000001 HC R&B MED SURG/OB

## 2025-01-05 PROCEDURE — 250N000013 HC RX MED GY IP 250 OP 250 PS 637: Performed by: STUDENT IN AN ORGANIZED HEALTH CARE EDUCATION/TRAINING PROGRAM

## 2025-01-05 PROCEDURE — 85027 COMPLETE CBC AUTOMATED: CPT | Performed by: STUDENT IN AN ORGANIZED HEALTH CARE EDUCATION/TRAINING PROGRAM

## 2025-01-05 PROCEDURE — 99232 SBSQ HOSP IP/OBS MODERATE 35: CPT | Performed by: PSYCHIATRY & NEUROLOGY

## 2025-01-05 PROCEDURE — 80048 BASIC METABOLIC PNL TOTAL CA: CPT | Performed by: STUDENT IN AN ORGANIZED HEALTH CARE EDUCATION/TRAINING PROGRAM

## 2025-01-05 PROCEDURE — 82962 GLUCOSE BLOOD TEST: CPT

## 2025-01-05 PROCEDURE — 99232 SBSQ HOSP IP/OBS MODERATE 35: CPT | Performed by: STUDENT IN AN ORGANIZED HEALTH CARE EDUCATION/TRAINING PROGRAM

## 2025-01-05 PROCEDURE — 82310 ASSAY OF CALCIUM: CPT | Performed by: STUDENT IN AN ORGANIZED HEALTH CARE EDUCATION/TRAINING PROGRAM

## 2025-01-05 PROCEDURE — 250N000013 HC RX MED GY IP 250 OP 250 PS 637

## 2025-01-05 RX ORDER — GABAPENTIN 300 MG/1
300 CAPSULE ORAL 3 TIMES DAILY
Status: DISCONTINUED | OUTPATIENT
Start: 2025-01-05 | End: 2025-01-06 | Stop reason: HOSPADM

## 2025-01-05 RX ADMIN — Medication 3 MG: at 00:29

## 2025-01-05 RX ADMIN — GABAPENTIN 300 MG: 300 CAPSULE ORAL at 20:12

## 2025-01-05 RX ADMIN — ALLOPURINOL 300 MG: 300 TABLET ORAL at 08:17

## 2025-01-05 RX ADMIN — PANTOPRAZOLE SODIUM 40 MG: 40 TABLET, DELAYED RELEASE ORAL at 16:14

## 2025-01-05 RX ADMIN — ACETAMINOPHEN 650 MG: 325 TABLET ORAL at 00:10

## 2025-01-05 RX ADMIN — ALLOPURINOL 300 MG: 300 TABLET ORAL at 20:12

## 2025-01-05 RX ADMIN — ASPIRIN 81 MG CHEWABLE TABLET 81 MG: 81 TABLET CHEWABLE at 08:17

## 2025-01-05 RX ADMIN — ACETAMINOPHEN 650 MG: 325 TABLET ORAL at 16:27

## 2025-01-05 RX ADMIN — GABAPENTIN 300 MG: 300 CAPSULE ORAL at 13:44

## 2025-01-05 RX ADMIN — PANTOPRAZOLE SODIUM 40 MG: 40 TABLET, DELAYED RELEASE ORAL at 08:18

## 2025-01-05 RX ADMIN — ISOSORBIDE MONONITRATE 30 MG: 30 TABLET, EXTENDED RELEASE ORAL at 08:18

## 2025-01-05 ASSESSMENT — ACTIVITIES OF DAILY LIVING (ADL)
ADLS_ACUITY_SCORE: 46
ADLS_ACUITY_SCORE: 46
ADLS_ACUITY_SCORE: 51
ADLS_ACUITY_SCORE: 46
ADLS_ACUITY_SCORE: 51
ADLS_ACUITY_SCORE: 46
ADLS_ACUITY_SCORE: 46
ADLS_ACUITY_SCORE: 51
ADLS_ACUITY_SCORE: 46
ADLS_ACUITY_SCORE: 51
ADLS_ACUITY_SCORE: 51
ADLS_ACUITY_SCORE: 46
ADLS_ACUITY_SCORE: 46

## 2025-01-05 NOTE — PROGRESS NOTES
NEUROLOGY PROGRESS NOTE     Sedrick Murray,  1950, MRN [id number] Date: 2025     [facility]   Code status:  Full Code   PCP: Milvia Costa, [phone]      ASSESSMENT & PLAN   Diagnosis code: Headache    Headache with blurred vision and dizziness  Chest pain    Head CT negative for acute structural lesions  MRI brain negative for acute structural lesions.  Does show multifocal gradient susceptibility compatible with sequela of remote hemorrhage.  Treatment of headache with Tylenol.  Will add gabapentin 300 mg 3 times a day to see if it further helps with the headache  IV fluids/supportive care.  Patient's blood pressure is low which can sometimes cause headaches as well- ?  Presyncope  Cardiology involved for management of chest pain  Headache could be a vestibular migraine.    Discharge:-Per primary team       Chief Complaint   Patient presents with    Chest Pain        HISTORY OF PRESENT ILLNESS     We have been requested by dr. Farias to evaluate Sedrick Murray who is a 74 year old  male for evaluation of headaches/blurred vision.  This is a 74-year-old male with history of hemorrhagic stroke, chest pain, anxiety, dyslipidemia, gout who presented with some chest pain radiating to his head causing a headache.  He also complains of associated blurred vision in both eyes.  This is more like blurred vision than loss of vision.  He further complains of room spinning dizziness.  He has had similar symptoms in the past.  Cardiology has evaluated him for chest pain due to history of coronary artery disease.  Troponin was mildly elevated.  Overall they are not concerned about ACS.  Headache is slowly getting better.    /  Patient's headache is slowly getting better.  Continues to have some blurred vision.  Is requesting for more medication as Tylenol is not completely effective.  MRI completed yesterday.     PAST MEDICAL & SURGICAL HISTORY     Medical History  Past Medical History:   Diagnosis Date     Anxiety     Back pain     Cerebral vascular accident (H)     Chest pain     Chronic pain     Diarrhea     Dyslipidemia 9/1/2014    Gout     Hemorrhagic stroke (H)     Hypertension      Surgical History  Past Surgical History:   Procedure Laterality Date    CHOLECYSTECTOMY      CV CORONARY ANGIOGRAM N/A 4/12/2024    Procedure: Coronary Angiogram;  Surgeon: Phu Delgado MD;  Location: [facility] CATH LAB CV    CV LEFT HEART CATH N/A 4/12/2024    Procedure: Left Heart Catheterization;  Surgeon: Phu Delgado MD;  Location: [facility] CATH LAB CV    IR MISCELLANEOUS PROCEDURE  3/9/2013    IR MISCELLANEOUS PROCEDURE  3/9/2013        SOCIAL HISTORY     Social History     Tobacco Use    Smoking status: Never   Substance Use Topics    Alcohol use: Yes    Drug use: No        FAMILY HISTORY     Reviewed, and family history includes Cerebrovascular Disease in his mother.     ALLERGIES     Allergies   Allergen Reactions    Dilaudid [Hydromorphone] Unknown    Phenytoin Hives and Itching        REVIEW OF SYSTEMS     12 system ROS was done within the HPI this was negative.  Pertinent positives noted in the HPI.     HOME & HOSPITAL MEDICATIONS     Prior to Admission Medications  Medications Prior to Admission   Medication Sig Dispense Refill Last Dose/Taking    allopurinol (ZYLOPRIM) 300 MG tablet Take 300 mg by mouth 2 times daily   1/3/2025 Morning    amLODIPine (NORVASC) 10 MG tablet Take 10 mg by mouth every morning.   1/3/2025 Morning    ASPIRIN LOW DOSE 81 MG chewable tablet Take 81 mg by mouth every morning.   1/3/2025 Morning    bumetanide (BUMEX) 1 MG tablet Take 1 tablet (1 mg) by mouth daily. 30 tablet 1 1/3/2025 Morning    isosorbide mononitrate (IMDUR) 30 MG 24 hr tablet Take 30 mg by mouth every morning.   1/3/2025 Morning    losartan (COZAAR) 100 MG tablet Take 100 mg by mouth every morning.   1/3/2025 Morning    metFORMIN (GLUCOPHAGE) 500 MG tablet Take 500 mg by mouth 2 times daily (with meals).   1/3/2025  "Morning    nitroGLYcerin (NITROSTAT) 0.4 MG sublingual tablet For chest pain place 1 tablet under the tongue every 5 minutes for 3 doses. If symptoms persist 5 minutes after 1st dose call 911. 20 tablet 3 Taking    VITAMIN D3 25 MCG (1000 UT) tablet Take 25 mcg by mouth every morning.   1/3/2025 Morning       Hospital Medications  Current Facility-Administered Medications   Medication Dose Route Frequency Provider Last Rate Last Admin    allopurinol (ZYLOPRIM) tablet 300 mg  300 mg Oral BID Mani Farias MD   300 mg at 01/05/25 0817    [Held by provider] amLODIPine (NORVASC) tablet 10 mg  10 mg Oral Mani Delarosa MD        aspirin (ASA) chewable tablet 81 mg  81 mg Oral Mani Delarosa MD   81 mg at 01/05/25 0817    [Held by provider] bumetanide (BUMEX) tablet 1 mg  1 mg Oral Daily Mani Farias MD        gabapentin (NEURONTIN) capsule 300 mg  300 mg Oral TID Mika Soler MD        isosorbide mononitrate (IMDUR) 24 hr tablet 30 mg  30 mg Oral Mani Delarosa MD   30 mg at 01/05/25 0818    [Held by provider] losartan (COZAAR) tablet 100 mg  100 mg Oral Mani Delarosa MD        [Held by provider] metFORMIN (GLUCOPHAGE) tablet 500 mg  500 mg Oral BID w/meals Mani Farias MD        pantoprazole (PROTONIX) EC tablet 40 mg  40 mg Oral BID AC Johnny Wright MD   40 mg at 01/05/25 0818    sodium chloride (PF) 0.9% PF flush 3 mL  3 mL Intracatheter Q8H Mani Farias MD   3 mL at 01/05/25 1053        PHYSICAL EXAM     Vital signs  Temp:  [97  F (36.1  C)-98.2  F (36.8  C)] 98  F (36.7  C)  Pulse:  [66-68] 66  Resp:  [16-20] 16  BP: (126-140)/(59-65) 140/65  SpO2:  [95 %-96 %] 96 %    PHYSICAL EXAMINATION  VITALS: BP (!) 140/65 (BP Location: Left arm)   Pulse 66   Temp 98  F (36.7  C) (Oral)   Resp 16   Ht 1.6 m (5' 3\")   Wt 73.9 kg (162 lb 14.7 oz)   SpO2 96%   BMI 28.86 kg/m    GENERAL -Health appearing, No apparent distress  EYES- No scleral icterus, no eyelid " droop, Pupils - see Neuro section  HEENT - Normocephalic, atraumatic, Hearing grossly intact; Oral mucosa moist and pink in color. External Ears and nose intact.   Neck - supple   PULM - Good spontaneous respiratory effort;   CV-extremities warm and well-perfused.  MSK- Gait - see Neuro section; Strength and tone- see Neuro section; Range of motion grossly intact.  PSYCH -cooperative    Neurological  Mental status - Patient is awake and grossly oriented to self, place and time. Attention span is normal. Language is fluent and follows commands appropriately.   Cranial nerves - CN II-XII intact. Pupils are reactive and symmetric; EOMI, VFIT, NLF symmetric  Motor - There is no pronator drift. Motor exam shows 5/5 strength in all extremities.  Tone - Tone is symmetric bilaterally in upper and lower extremities.  Reflexes - Reflexes are symmetric/decreased.  Sensation - Sensory exam is grossly intact to light touch, pain.  Coordination - Finger to nose and heel to shin is without dysmetria.  Gait and station --needs assistance to ambulate.  Formal gait testing cannot be done due to safety concerns from ongoing medical issues  Exam stable     DIAGNOSTIC STUDIES     Pertinent Radiology   CT head  IMPRESSION:  1.  No acute intracranial findings.  2.  Mild age-related changes.    Carotid US 2023  IMPRESSION:    1. Based on the ICA velocities, ICA/CCA ratio, and 2D images there is plaque causing <50% stenosis in the right internal carotid artery and there is plaque causing <50% stenosis in the left internal carotid artery.    2. Normal antegrade flow in the right vertebral artery and No identifiable flow in the left vertebral artery.    3. Multiphasic flow within bilateral subclavian arteries.    4. Right vertebral artery flow: Antegrade       No arterial flow could be detected in the region of the left vertebral artery.     MRI brain  IMPRESSION:  1.  No acute or subacute ischemic change.  2.  No acute intracranial  hemorrhage or abnormal enhancement.  3.  Probable mild sequela of chronic small vessel ischemic disease and remote infarction.  4.  Mild brain parenchymal volume loss.  5.  Multifocal gradient susceptibility compatible with sequela of remote hemorrhage, could be seen with hypertensive microangiopathy or cerebral amyloid angiopathy.    Recent Results (from the past 24 hours)   Echocardiogram Limited    Collection Time: 01/04/25  2:20 PM   Result Value Ref Range    LVEF  60-65%    Glucose by meter    Collection Time: 01/05/25  2:11 AM   Result Value Ref Range    GLUCOSE BY METER POCT 129 (H) 70 - 99 mg/dL   Basic metabolic panel    Collection Time: 01/05/25  6:05 AM   Result Value Ref Range    Sodium 138 135 - 145 mmol/L    Potassium 3.9 3.4 - 5.3 mmol/L    Chloride 108 (H) 98 - 107 mmol/L    Carbon Dioxide (CO2) 22 22 - 29 mmol/L    Anion Gap 8 7 - 15 mmol/L    Urea Nitrogen 22.2 8.0 - 23.0 mg/dL    Creatinine 1.00 0.67 - 1.17 mg/dL    GFR Estimate 79 >60 mL/min/1.73m2    Calcium 8.6 (L) 8.8 - 10.4 mg/dL    Glucose 105 (H) 70 - 99 mg/dL   CBC with platelets    Collection Time: 01/05/25  6:05 AM   Result Value Ref Range    WBC Count 4.1 4.0 - 11.0 10e3/uL    RBC Count 4.50 4.40 - 5.90 10e6/uL    Hemoglobin 13.9 13.3 - 17.7 g/dL    Hematocrit 41.3 40.0 - 53.0 %    MCV 92 78 - 100 fL    MCH 30.9 26.5 - 33.0 pg    MCHC 33.7 31.5 - 36.5 g/dL    RDW 13.5 10.0 - 15.0 %    Platelet Count 119 (L) 150 - 450 10e3/uL       Total time spent for face to face visit, reviewing labs/imaging studies, counseling and coordination of care was: Over 35 min More than 50% of this time was spent on counseling and coordination of care.    Counseling patient.  Reviewing MRI with the patient and family.    Mika Soler MD  Neurologist  [facility]  Tel:- [phone]

## 2025-01-05 NOTE — PLAN OF CARE
ASSUMED CARES: [id number]  STATUS: Pt admitted for blurred Vision.   NEURO: A/o x 4. Neuros intact  VS: VSS on RA  ACTIVITY: SBA  PAIN: C/o epigastric pain- PRN APAP given   CARDIAC: Denies CP  RESP: Denies SOB  GI/: Voiding spontaneously. +BS  DIET: Cardiac   SKIN: No adjustments made  LDA'S: R PIV SL.   LABS: Stool Spec Needed.  at 0200.  POC: Cont with POC. Cards and Neuro Following. OP GI referral for EGD. Plan for possible discharge today. Call light within reach.

## 2025-01-05 NOTE — PLAN OF CARE
PRIMARY DIAGNOSIS: Blurred Vision  OUTPATIENT/OBSERVATION GOALS TO BE MET BEFORE DISCHARGE:  ADLs back to baseline: No- SBA    Activity and level of assistance: Up with standby assistance.    Pain status: Improved-controlled with oral pain medications.    Return to near baseline physical activity: Yes     Discharge Planner Nurse   Safe discharge environment identified: No  Barriers to discharge: Yes Cards and Neuro Following       Entered by: Saba Allen RN 01/05/2025 12:53 AM     Please review provider order for any additional goals.   Nurse to notify provider when observation goals have been met and patient is ready for discharge.

## 2025-01-05 NOTE — PROGRESS NOTES
"PRIMARY DIAGNOSIS: \"GENERIC\" NURSING  OUTPATIENT/OBSERVATION GOALS TO BE MET BEFORE DISCHARGE:  ADLs back to baseline: No    Activity and level of assistance: Assist x 1    Pain status: Improved-controlled with oral pain medications.    Return to near baseline physical activity: Yes     Discharge Planner Nurse   Safe discharge environment identified: No  Barriers to discharge: Yes         "

## 2025-01-05 NOTE — PROVIDER NOTIFICATION
Pt requesting something to help with sleep- Can melatonin be ordered? Thanks    P[place]coo text paged at [phone] via [employer]    Response: Melatonin order placed

## 2025-01-05 NOTE — PLAN OF CARE
PRIMARY DIAGNOSIS: Blurred Visit and SOB  OUTPATIENT/OBSERVATION GOALS TO BE MET BEFORE DISCHARGE:  ADLs back to baseline: No    Activity and level of assistance: assist of 1    Pain status: c/o epigastric pain 2-3/10.  Declines pain medication.    Return to near baseline physical activity: No     Discharge Planner Nurse   Safe discharge environment identified: Yes  Barriers to discharge: Yes       Entered by: Saba Jose RN 01/05/2025 10:02 AM    Patient reports that he is still having blurred vision but it has improved.

## 2025-01-05 NOTE — PROGRESS NOTES
[facility]    Medicine Progress Note - Hospitalist Service    Date of Admission:  1/3/2025    Assessment & Plan   Sedrick Murray is a 74 year old male history of CAD, CKD stage III, diabetes, who presents for evaluation of abdominal pain and positional lightheadedness. Stable here without emergent findings.    #Acute epigastric pain - Resolved  Reports 1 week of epigastric/lower chest pain that worsens with eating, and exertion. On exam, he has TTP of the epigastrium and LUQ which he says is the pain he's here for. HS trop was flat ~50 on admission. Cardiology evaluated, patient has known obstructive CAD with history of hemorrhagic CVA has limited interventions. This pain seems more likely to be GI in nature and less likely cardiac given the TTP of the abdomen. The patient is not anemic. Would treat as GERD and plan for outpatient GI evaluation.  - Appreciate cardiology eval  - Continue ASA 81mg daily  - Started on  Imdur 30mg daily  - Started on pantoprazole 40mg PO BID  - OP GI referral for possible EGD  - Ferritin high (not low), suggestive of normal stores with acute phase reaction  - H. Pylori stool antigen ordered if he has a bowel movement    #Positional lightheadedness  Patient describes a week of positional lightheadedness. No syncope or falls. On admission he was noted to have a pre-renal PANKAJ that improved with IV hydration. BP was 92/50. Suspect that he has been dehydrated from poor PO intake due to his above abdominal pain. In addition, he is on high dose antihypertensives and a diuretic.  - S/p IVF  - Encourage patient to get up and walk as he's able  - Hold home amlodipine, losartan, Bumex > might only resume low dose ARB and diursis at discharge, agree that hypotension likely primary component   - HA still persists, neurontin started by neuro and will follow one more night - he does not feel that he can discharge today  - MRI:   1.  No acute or subacute ischemic change.  2.  No  acute intracranial hemorrhage or abnormal enhancement.  3.  Probable mild sequela of chronic small vessel ischemic disease and remote infarction.  4.  Mild brain parenchymal volume loss.  5.  Multifocal gradient susceptibility compatible with sequela of remote hemorrhage, could be seen with hypertensive microangiopathy or cerebral amyloid angiopathy.    #PANKAJ on CKD 2  Cr 2.3 on admission from baseline of 1-1.2. Returned to baseline with 1L IVF.  - Losartan held as above    #Primary HTN  BP 92/50 when he arrived in the ED.  - Hold home amlodipine, losartan as above  - Doing well off these, monitor BP and consider reducing regimen at discharge, especially with addition of Imdur(as noted above)    #History of hemorrhagic CVA  CT head and neck no acute intracranial findings. Chronic left-sided weakness, stable.    #Chronic HFpEF without acute exacerbation, and mod-severe MR on TTE  BNP normal. No peripheral edema. No hypoxemia. Dry on admission with an PANKAJ responsive to IVF.  - Hold home Bumex as above for dehyration  1. The left ventricle is normal in size. Left ventricular function is  normal.The ejection fraction is 60-65%. There is mild to moderate concentric  left ventricular hypertrophy. No regional wall motion abnormalities noted.  2. Normal right ventricle size and systolic function.  3. There is moderately severe (3+) aortic regurgitation. Highly eccentric jet  and difficult to quantify on current study.  4. Normal RA pressure of 3 mmHg. The aorta root is normal at 38 mm.  - cardiology will follow him after discharge and can monitor is MR    #Diabetes type 2  - Hold home metformin  - Sliding scale insulin started    #Gout  - Continue home allopurinol    #Incidental lung fibrosis  Noted on CT to have chronic subpleural interstitial changes, likely ILD.  - Pulmonology referral at discharge            Diet: Combination Diet Low Saturated Fat Na <2400mg Diet, No Caffeine Diet    DVT Prophylaxis: Pneumatic  "Compression Devices  Eugene Catheter: Not present  Lines: None     Cardiac Monitoring: ACTIVE order. Indication: Chest pain/ ACS rule out (24 hours)  Code Status: Full Code      Clinically Significant Risk Factors Present on Admission          # Hyperchloremia: Highest Cl = 109 mmol/L in last 2 days, will monitor as appropriate            # Drug Induced Platelet Defect: home medication list includes an antiplatelet medication   # Hypertension: Noted on problem list          # DMII: A1C = 7.2 % (Ref range: <5.7 %) within past 6 months    # Overweight: Estimated body mass index is 28.86 kg/m  as calculated from the following:    Height as of this encounter: 1.6 m (5' 3\").    Weight as of this encounter: 73.9 kg (162 lb 14.7 oz).         # Financial/Environmental Concerns:           Social Drivers of Health    Tobacco Use: Unknown (1/3/2025)    Patient History     Smoking Tobacco Use: Never     Smokeless Tobacco Use: Unknown    Received from [employer]    Social Connections          Disposition Plan     Medically Ready for Discharge: Anticipated Tomorrow             Benjamin Borden MD  Hospitalist Service  [facility]  Securely message with [employer] (more info)  Text page via [employer] Paging/Directory   ______________________________________________________________________    Interval History   Still has HA and doesn't feel well enough to go.  Positional light-headedness as well.    No CP since imdur started     Physical Exam   Vital Signs: Temp: 98  F (36.7  C) Temp src: Oral BP: (!) 140/65 Pulse: 66   Resp: 16 SpO2: 96 % O2 Device: None (Room air)    Weight: 162 lbs 14.72 oz    General: No overt distress, conversational, non-toxic appearing  HEENT: MMM  Pulmonary: CTAB; no crackles, wheezing, or rhonchi, normal effort  Cardiac: RRR. No m/r/g  Abdomen: Soft. ND.  Extremities: No bilateral LE edema  Neuro: alert and awake, " Ox4      Medical Decision Making       50 MINUTES SPENT BY ME on the date of service doing chart review, history, exam, documentation & further activities per the note.      Data     I have personally reviewed the following data over the past 24 hrs:    4.1  \   13.9   / 119 (L)     138 108 (H) 22.2 /  105 (H)   3.9 22 1.00 \     Ferritin:  N/A % Retic:  N/A LDH:  N/A       Imaging results reviewed over the past 24 hrs:   Recent Results (from the past 24 hours)   Echocardiogram Limited   Result Value    LVEF  60-65%    Narrative    [id number]  [id number]  [id number]  [id number]^LISHA^NELY^[place] [address]     Name: PAYTON BRANDT  MRN: [id number]  : 1950  Study Date: 2025 01:58 PM  Age: 74 yrs  Gender: Male  Patient Location: [facility]  Reason For Study: Abn EKG  Ordering Physician: NELY HUSAIN  Performed By: CHARLES     BSA: 1.8 m2  Height: 63 in  Weight: 176 lb  HR: 63  ______________________________________________________________________________  Procedure  Limited Echocardiogram with two-dimensional, color and spectral Doppler.  ______________________________________________________________________________  Interpretation Summary     1. The left ventricle is normal in size. Left ventricular function is  normal.The ejection fraction is 60-65%. There is mild to moderate concentric  left ventricular hypertrophy. No regional wall motion abnormalities noted.  2. Normal right ventricle size and systolic function.  3. There is moderately severe (3+) aortic regurgitation. Highly eccentric jet  and difficult to quantify on current study.  4. Normal RA pressure of 3 mmHg. The aorta root is normal at 38 mm.     Consider LOKESH or cardiac MRI to quantify severity of aortic regurgitation.  ______________________________________________________________________________  Left Ventricle  The left ventricle is normal in size. Left ventricular function is normal.The  ejection  fraction is 60-65%. There is mild to moderate concentric left  ventricular hypertrophy. No regional wall motion abnormalities noted.     Right Ventricle  Normal right ventricle size and systolic function.     Atria  Normal left atrial size. Right atrial size is normal. There is no color  Doppler evidence of an atrial shunt.     Mitral Valve  Mitral valve leaflets appear normal. There is no evidence of mitral stenosis  or clinically significant mitral regurgitation.     Tricuspid Valve  The tricuspid valve is not well visualized. Right ventricular systolic  pressure could not be approximated due to inadequate tricuspid regurgitation.     Aortic Valve  There is mild trileaflet aortic sclerosis. There is moderately severe (3+)  aortic regurgitation. No aortic stenosis is present.     Pulmonic Valve  The pulmonic valve is not well seen, but is grossly normal. This degree of  valvular regurgitation is within normal limits. There is trace pulmonic  valvular regurgitation.     Vessels  The aorta root is normal. IVC diameter <2.1 cm collapsing >50% with sniff  suggests a normal RA pressure of 3 mmHg.     Pericardium  There is no pericardial effusion.     ______________________________________________________________________________  MMode/2D Measurements & Calculations  IVSd: 1.1 cm  LVIDd: 4.7 cm  LVIDs: 3.0 cm  LVPWd: 1.2 cm     FS: 36.0 %  LV mass(C)d: 197.6 grams  LV mass(C)dI: 107.9 grams/m2  asc Aorta Diam: 3.7 cm  Asc Ao diam index BSA (cm/m2): 2.0  Asc Ao diam index Ht(cm/m): 2.3  EF Biplane: 59.9 %  RWT: 0.51     Time Measurements  Aortic HR: 61.0 BPM  MM HR: 60.0 BPM     Doppler Measurements & Calculations  MV max P.7 mmHg  MV mean P.0 mmHg  MV V2 VTI: 26.4 cm  Ao V2 max: 188.5 cm/sec  Ao max P.0 mmHg  Ao V2 mean: 155.0 cm/sec  Ao mean PG: 10.0 mmHg  Ao V2 VTI: 44.2 cm  AI P1/2t: 516.0 msec     LV V1 max P.2 mmHg  LV V1 max: 143.0 cm/sec  LV V1 VTI: 29.8 cm  AV Noah Ratio (DI): 0.76      ______________________________________________________________________________  Report approved by: John Paul Perkins MD on 01/04/2025 03:32 PM         MR Brain w/o & w Contrast    Narrative    EXAM: MR BRAIN W/O and W CONTRAST  LOCATION: [facility]  DATE: 1/4/2025    INDICATION: Headache with blurred vision; Headache; Neurologic deficit, non traumatic; Visual symptoms; Neurologic deficit onset <= 24 hours; No known automatically detected potential contraindications to iodinated contrast  COMPARISON: January 3, 2025. MRI head May 28, 2017  CONTRAST: 7mL Gadavist  TECHNIQUE: Routine multiplanar multisequence head MRI without and with intravenous contrast.    FINDINGS:  INTRACRANIAL CONTENTS: No acute or subacute infarct. Remote left occipital infarct Chronic bilateral basal ganglia and thalamic infarcts and hemosiderin staining compatible with remote hemorrhage. Additional small foci of gradient susceptibility are   present within the bilateral cerebral hemispheres and right cerebellum compatible with remote microhemorrhage. No mass, acute hemorrhage, or extra-axial fluid collections. Scattered nonspecific T2/FLAIR hyperintensities within the cerebral white matter   most consistent with mild chronic microvascular ischemic change. Mild generalized cerebral atrophy. No hydrocephalus. Normal position of the cerebellar tonsils. No pathologic contrast enhancement.    SELLA: No abnormality accounting for technique.    OSSEOUS STRUCTURES/SOFT TISSUES: Normal marrow signal. The major intracranial vascular flow voids are maintained.     ORBITS: No abnormality accounting for technique.     SINUSES/MASTOIDS: Mucosal thickening primarily involving the ethmoid air cells. No middle ear or mastoid effusion.       Impression    IMPRESSION:  1.  No acute or subacute ischemic change.  2.  No acute intracranial hemorrhage or abnormal enhancement.  3.  Probable mild sequela of chronic small vessel ischemic  disease and remote infarction.  4.  Mild brain parenchymal volume loss.  5.  Multifocal gradient susceptibility compatible with sequela of remote hemorrhage, could be seen with hypertensive microangiopathy or cerebral amyloid angiopathy.

## 2025-01-05 NOTE — PLAN OF CARE
"PRIMARY DIAGNOSIS: blurred vision SOB  OUTPATIENT/OBSERVATION GOALS TO BE MET BEFORE DISCHARGE  1. Orthostatic performed: N/A    2. Tolerating PO medications: Yes    3. Return to near baseline physical activity: Yes    4. Cleared for discharge by consultants (if involved): No    Discharge Planner Nurse   Safe discharge environment identified: Yes  Barriers to discharge: No       Entered by: Pippa Gonzalez RN 01/05/2025 2:49 PM     Please review provider order for any additional goals.   Nurse to notify provider when observation goals have been met and patient is ready for discharge.Goal Outcome Evaluation:  BP (!) 140/65 (BP Location: Left arm)   Pulse 66   Temp 98  F (36.7  C) (Oral)   Resp 16   Ht 1.6 m (5' 3\")   Wt 73.9 kg (162 lb 14.7 oz)   SpO2 96%   BMI 28.86 kg/m           Q4 Neuro checks:   Neurological - A&O x4   Mental status - follows commands without difficulty Responds to questions appropriately  Cranial nerves - Pupils are symmetric and reactive.     Motor - 5/5 motor strength, no drift Symmetric bilaterally all extremities.  Assist of 1 when OOB for stability / safety.    Telemetry is ON with Q4 neuro check order in place.  NSR     Fnag Gonzalez RN            "

## 2025-01-05 NOTE — PROGRESS NOTES
[facility]   [address], [place] [address]   [email or web address]   OFFICE: [phone]     CARDIOLOGY INPATIENT PROGRESS NOTE     Impression and Plan     Assessment/Plan:  Mr. Sedrick Murray is a 74 year old male with CAD managed medically, HFpEF, HTN, mod-severe AI, and prior hemorrhagic CVA in 2017 who presented to the hospital with dizziness and chest pain.     Chest pain   - He has known stenosis on [facility] however this was not intervened on due to high risk of bleeding given hx of hemorrhagic CVA.  His pain currently is more suggestive of GI related pain such as GERD or ulcer rather than cardiac angina.  His pain has responded to trial of GI cocktail and PPI   - Continue imdur   - Mildly elevated troponin with a plateau trend not consistent with ACS.  Likely supply/demand mismatch related to PANKAJ/dehydration and known underlying CAD.   - At this point no plans to repeat ischemic testing.  If it was felt he would benefit from PCI this would be planned on an outpt basis due to current PANKAJ.  Pt and family agree with plan.      Dizziness   - Likely hypotension and dehydration related to poor PO intake   - Encourage PO intake, hydration     Will sign off  He is not currently following with cardiology.  He may follow with me on discharge from the hospital.     Primary Cardiologist: Not established      Subjective     Sedrick Murray is feeling better.  Pain is much improved        Review of Systems:  Further review of systems is otherwise negative/noncontributory (based on review of medical record (admission H&P) and 13 point review of systems reviewed. Pertinent positives noted).    Cardiac Diagnostics     ECG: Personally reviewed and interpreted: 1/3/2025  NSR  Inferolateral TWI     Telemetry (personally reviewed): NSR     Most recent:  Echocardiogram (results reviewed): 10/14/2024  Interpretation Summary     The left ventricle is normal in size. There is mild to  moderate concentric  left ventricular hypertrophy.  Hyperdynamic left ventricular function The visual ejection fraction is 65-70%.  No regional wall motion abnormalities noted.     The right ventricle is normal in size and function.  Normal left atrial size. Right atrial size is normal.  There is moderate (2+) aortic regurgitation.  IVC diameter <2.1 cm collapsing >50% with sniff suggests a normal RA pressure  of 3 mmHg.  Compared to prior study, there is no significant change.     Cardiac Cath (results reviewed): 4/12/2024    Dist LAD lesion is 80% stenosed.    Prox LAD to Mid LAD lesion is 20% stenosed.    Mid LAD lesion is 50% stenosed.    1st Diag lesion is 30% stenosed.    Mid Cx to Dist Cx lesion is 30% stenosed.    RPDA lesion is 40% stenosed.    Prox RCA to Dist RCA lesion is 15% stenosed.        Medical History  Surgical History Family History Social History   Past Medical History:   Diagnosis Date    Anxiety     Back pain     Cerebral vascular accident (H)     Chest pain     Chronic pain     Diarrhea     Dyslipidemia 9/1/2014    Gout     Hemorrhagic stroke (H)     Hypertension      Past Surgical History:   Procedure Laterality Date    CHOLECYSTECTOMY      CV CORONARY ANGIOGRAM N/A 4/12/2024    Procedure: Coronary Angiogram;  Surgeon: Phu Delgado MD;  Location: [facility] CATH LAB CV    CV LEFT HEART CATH N/A 4/12/2024    Procedure: Left Heart Catheterization;  Surgeon: Phu Delgado MD;  Location: [facility] CATH LAB CV    IR MISCELLANEOUS PROCEDURE  3/9/2013    IR MISCELLANEOUS PROCEDURE  3/9/2013     Family History   Problem Relation Age of Onset    Cerebrovascular Disease Mother            Social History     Socioeconomic History    Marital status:      Spouse name: Not on file    Number of children: Not on file    Years of education: Not on file    Highest education level: Not on file   Occupational History    Not on file   Tobacco Use    Smoking status: Never    Smokeless tobacco: Not on  file   Substance and Sexual Activity    Alcohol use: Yes    Drug use: No    Sexual activity: Not on file   Other Topics Concern    Not on file   Social History Narrative    Patient presented to the ED with his wife 04/25/17       Social Drivers of Health     Financial Resource Strain: Low Risk  (1/4/2025)    Financial Resource Strain     Within the past 12 months, have you or your family members you live with been unable to get utilities (heat, electricity) when it was really needed?: No   Food Insecurity: Low Risk  (1/4/2025)    Food Insecurity     Within the past 12 months, did you worry that your food would run out before you got money to buy more?: No     Within the past 12 months, did the food you bought just not last and you didn t have money to get more?: No   Transportation Needs: Low Risk  (1/4/2025)    Transportation Needs     Within the past 12 months, has lack of transportation kept you from medical appointments, getting your medicines, non-medical meetings or appointments, work, or from getting things that you need?: No   Physical Activity: Not on file   Stress: Not on file   Social Connections: Unknown (4/19/2023)    Received from [facility], [facility]    Social Connections     Frequency of Communication with Friends and Family: Not on file   Interpersonal Safety: Low Risk  (10/14/2024)    Interpersonal Safety     Do you feel physically and emotionally safe where you currently live?: Yes     Within the past 12 months, have you been hit, slapped, kicked or otherwise physically hurt by someone?: No     Within the past 12 months, have you been humiliated or emotionally abused in other ways by your partner or ex-partner?: No   Housing Stability: Low Risk  (1/4/2025)    Housing Stability     Do you have housing? : Yes     Are you worried about losing your housing?: No             Physical Examination   VITALS: /61 (BP Location: Left  "arm)   Pulse 66   Temp 98  F (36.7  C) (Oral)   Resp 18   Ht 1.6 m (5' 3\")   Wt 73.9 kg (162 lb 14.7 oz)   SpO2 96%   BMI 28.86 kg/m    BMI: Body mass index is 28.86 kg/m .  Wt Readings from Last 3 Encounters:   01/04/25 73.9 kg (162 lb 14.7 oz)   11/03/24 81.2 kg (179 lb)   10/21/24 80.1 kg (176 lb 9.4 oz)       Intake/Output Summary (Last 24 hours) at 1/5/2025 1136  Last data filed at 1/5/2025 1053  Gross per 24 hour   Intake 445 ml   Output 150 ml   Net 295 ml       General: pleasant male. No acute distress.   HENT: external ears normal. Nares patent. Mucous membranes moist.  Neck: No JVD  Lungs: clear to auscultation  COR:  regular rate and rhythm, No murmurs, rubs, or gallops  Abd: nondistended, BS present  Extrem: No edema         Non-cardiac Imaging Studies Reviewed             Lab Results Reviewed    Chemistry/lipid CBC Cardiac Enzymes/BNP/TSH/INR   Recent Labs   Lab Test 04/11/24  1215   CHOL 210*   HDL 80   LDL 61   TRIG 343*     Recent Labs   Lab Test 04/11/24  1215 05/29/17  0623 03/17/17  0447   LDL 61 73 120     Recent Labs   Lab Test 01/05/25  0605      POTASSIUM 3.9   CHLORIDE 108*   CO2 22   *   BUN 22.2   CR 1.00   GFRESTIMATED 79   SAHARA 8.6*     Recent Labs   Lab Test 01/05/25  0605 01/04/25  0704 01/03/25  1433   CR 1.00 1.18* 2.31*     Recent Labs   Lab Test 10/14/24  0657 04/25/24 2026 03/17/17  0447   A1C 7.2* 7.4* 5.8          Recent Labs   Lab Test 01/05/25  0605   WBC 4.1   HGB 13.9   HCT 41.3   MCV 92   *     Recent Labs   Lab Test 01/05/25  0605 01/04/25  0704 01/03/25  1433   HGB 13.9 14.0 15.5    Recent Labs   Lab Test 05/06/20  0520 05/05/20  2148   TROPONINI 0.03 0.03     Recent Labs   Lab Test 01/03/25  1653 10/13/24  2354 04/25/24  2026   NTBNPI 56 504 308     Recent Labs   Lab Test 05/05/20  2148   TSH 1.06     Recent Labs   Lab Test 04/11/24  1216 05/05/20 2148   INR 0.99 0.98           Current Inpatient Scheduled Medications   Scheduled Meds:  Current " Facility-Administered Medications   Medication Dose Route Frequency Provider Last Rate Last Admin    allopurinol (ZYLOPRIM) tablet 300 mg  300 mg Oral BID Mani Farias MD   300 mg at 01/05/25 0817    [Held by provider] amLODIPine (NORVASC) tablet 10 mg  10 mg Oral Mani Delarosa MD        aspirin (ASA) chewable tablet 81 mg  81 mg Oral Mani Delarosa MD   81 mg at 01/05/25 0817    [Held by provider] bumetanide (BUMEX) tablet 1 mg  1 mg Oral Daily Mani Farias MD        isosorbide mononitrate (IMDUR) 24 hr tablet 30 mg  30 mg Oral Mani Delarosa MD   30 mg at 01/05/25 0818    [Held by provider] losartan (COZAAR) tablet 100 mg  100 mg Oral Mani Delarosa MD        [Held by provider] metFORMIN (GLUCOPHAGE) tablet 500 mg  500 mg Oral BID w/meals Mani Farias MD        pantoprazole (PROTONIX) EC tablet 40 mg  40 mg Oral BID AC Johnny Wright MD   40 mg at 01/05/25 0818    sodium chloride (PF) 0.9% PF flush 3 mL  3 mL Intracatheter Q8H Mani Farias MD   3 mL at 01/05/25 1053     Continuous Infusions:  Current Facility-Administered Medications   Medication Dose Route Frequency Provider Last Rate Last Admin       No current outpatient medications on file.          Medications Prior to Admission   Prior to Admission medications    Medication Sig Start Date End Date Taking? Authorizing Provider   allopurinol (ZYLOPRIM) 300 MG tablet Take 300 mg by mouth 2 times daily   Yes Reported, Patient   amLODIPine (NORVASC) 10 MG tablet Take 10 mg by mouth every morning. 1/8/24  Yes Reported, Patient   ASPIRIN LOW DOSE 81 MG chewable tablet Take 81 mg by mouth every morning. 4/2/24  Yes Reported, Patient   bumetanide (BUMEX) 1 MG tablet Take 1 tablet (1 mg) by mouth daily. 10/28/24  Yes Rod Chanel MD   isosorbide mononitrate (IMDUR) 30 MG 24 hr tablet Take 30 mg by mouth every morning.   Yes Reported, Patient   losartan (COZAAR) 100 MG tablet Take 100 mg by mouth every  morning. 9/23/24  Yes Reported, Patient   metFORMIN (GLUCOPHAGE) 500 MG tablet Take 500 mg by mouth 2 times daily (with meals).   Yes Reported, Patient   nitroGLYcerin (NITROSTAT) 0.4 MG sublingual tablet For chest pain place 1 tablet under the tongue every 5 minutes for 3 doses. If symptoms persist 5 minutes after 1st dose call 911. 4/29/24  Yes Cleveland Mercado DO   VITAMIN D3 25 MCG (1000 UT) tablet Take 25 mcg by mouth every morning. 9/23/24  Yes Reported, Patient          Marcel Barlow DO [facility]  Non-invasive Cardiologist  [facility]

## 2025-01-05 NOTE — PLAN OF CARE
PRIMARY DIAGNOSIS: Blurred vision and SOB  OUTPATIENT/OBSERVATION GOALS TO BE MET BEFORE DISCHARGE:  ADLs back to baseline: No    Activity and level of assistance: assist of 1    Pain status: C/O epigastric pain 2/10.  Declines pain medication.    Return to near baseline physical activity: No     Discharge Planner Nurse   Safe discharge environment identified: Yes  Barriers to discharge: Yes       Entered by: Saba Jose RN 01/05/2025 10:05 AM    Patient is alert and orientated x 4.  Patient reports that he is still having blurred vision but that it is much better.  LS are clear.  O2 sats 96% on RA.  Patient reports that he still feels SOB.

## 2025-01-05 NOTE — PROGRESS NOTES
"PRIMARY DIAGNOSIS: \"GENERIC\" NURSING  OUTPATIENT/OBSERVATION GOALS TO BE MET BEFORE DISCHARGE:  ADLs back to baseline: No    Activity and level of assistance: Assist x 1    Pain status: Improved-controlled with oral pain medications.    Return to near baseline physical activity: Yes     Discharge Planner Nurse   Safe discharge environment identified: No  Barriers to discharge: Yes       Entered by: Alvin Beckman RN 01/04/2025 10:49 PM     A & O x 4. VSS on RA. Pain addressed with PRN medication. Denies SOB/nausea. Tele: NSR. Neuro checks q4h, grossly intact. R PIV SL. Cardiac diet, tolerating well. Assist x 1, urinal within reach. Family stopped by for support. Cardiology, neurology following. Nursing continue to monitor.  "

## 2025-01-06 VITALS
RESPIRATION RATE: 16 BRPM | DIASTOLIC BLOOD PRESSURE: 62 MMHG | TEMPERATURE: 98 F | HEART RATE: 62 BPM | WEIGHT: 162.92 LBS | HEIGHT: 63 IN | SYSTOLIC BLOOD PRESSURE: 134 MMHG | OXYGEN SATURATION: 95 % | BODY MASS INDEX: 28.87 KG/M2

## 2025-01-06 LAB
ATRIAL RATE - MUSE: 79 BPM
ATRIAL RATE - MUSE: 82 BPM
DIASTOLIC BLOOD PRESSURE - MUSE: 52 MMHG
DIASTOLIC BLOOD PRESSURE - MUSE: NORMAL MMHG
INTERPRETATION ECG - MUSE: NORMAL
INTERPRETATION ECG - MUSE: NORMAL
P AXIS - MUSE: 42 DEGREES
P AXIS - MUSE: 90 DEGREES
PR INTERVAL - MUSE: 180 MS
PR INTERVAL - MUSE: 196 MS
QRS DURATION - MUSE: 82 MS
QRS DURATION - MUSE: 92 MS
QT - MUSE: 384 MS
QT - MUSE: 400 MS
QTC - MUSE: 448 MS
QTC - MUSE: 458 MS
R AXIS - MUSE: 21 DEGREES
R AXIS - MUSE: 38 DEGREES
SYSTOLIC BLOOD PRESSURE - MUSE: 101 MMHG
SYSTOLIC BLOOD PRESSURE - MUSE: NORMAL MMHG
T AXIS - MUSE: 122 DEGREES
T AXIS - MUSE: 86 DEGREES
VENTRICULAR RATE- MUSE: 79 BPM
VENTRICULAR RATE- MUSE: 82 BPM

## 2025-01-06 PROCEDURE — 250N000013 HC RX MED GY IP 250 OP 250 PS 637: Performed by: INTERNAL MEDICINE

## 2025-01-06 PROCEDURE — 250N000013 HC RX MED GY IP 250 OP 250 PS 637: Performed by: PSYCHIATRY & NEUROLOGY

## 2025-01-06 PROCEDURE — 99232 SBSQ HOSP IP/OBS MODERATE 35: CPT | Performed by: PSYCHIATRY & NEUROLOGY

## 2025-01-06 PROCEDURE — 250N000013 HC RX MED GY IP 250 OP 250 PS 637: Performed by: STUDENT IN AN ORGANIZED HEALTH CARE EDUCATION/TRAINING PROGRAM

## 2025-01-06 PROCEDURE — 99239 HOSP IP/OBS DSCHRG MGMT >30: CPT | Performed by: INTERNAL MEDICINE

## 2025-01-06 RX ORDER — BUMETANIDE 0.5 MG/1
0.5 TABLET ORAL DAILY
Qty: 30 TABLET | Refills: 1 | Status: SHIPPED | OUTPATIENT
Start: 2025-01-06

## 2025-01-06 RX ORDER — PANTOPRAZOLE SODIUM 40 MG/1
40 TABLET, DELAYED RELEASE ORAL DAILY
Qty: 30 TABLET | Refills: 1 | Status: SHIPPED | OUTPATIENT
Start: 2025-01-06

## 2025-01-06 RX ORDER — LOSARTAN POTASSIUM 25 MG/1
25 TABLET ORAL DAILY
Qty: 30 TABLET | Refills: 1 | Status: SHIPPED | OUTPATIENT
Start: 2025-01-06

## 2025-01-06 RX ADMIN — ALLOPURINOL 300 MG: 300 TABLET ORAL at 08:59

## 2025-01-06 RX ADMIN — ASPIRIN 81 MG CHEWABLE TABLET 81 MG: 81 TABLET CHEWABLE at 08:59

## 2025-01-06 RX ADMIN — PANTOPRAZOLE SODIUM 40 MG: 40 TABLET, DELAYED RELEASE ORAL at 08:59

## 2025-01-06 RX ADMIN — GABAPENTIN 300 MG: 300 CAPSULE ORAL at 09:00

## 2025-01-06 RX ADMIN — ISOSORBIDE MONONITRATE 30 MG: 30 TABLET, EXTENDED RELEASE ORAL at 08:59

## 2025-01-06 RX ADMIN — ACETAMINOPHEN 650 MG: 325 TABLET ORAL at 06:00

## 2025-01-06 RX ADMIN — ANTACID TABLETS 1000 MG: 500 TABLET, CHEWABLE ORAL at 06:00

## 2025-01-06 ASSESSMENT — ACTIVITIES OF DAILY LIVING (ADL)
ADLS_ACUITY_SCORE: 46
ADLS_ACUITY_SCORE: 50
ADLS_ACUITY_SCORE: 46
ADLS_ACUITY_SCORE: 50
ADLS_ACUITY_SCORE: 50
ADLS_ACUITY_SCORE: 46

## 2025-01-06 NOTE — PLAN OF CARE
Goal Outcome Evaluation:    Pt had minimal complaints of upper epigastric pain. Declined the need for pain medication. No nausea noted. Tolerated meals well. Neuros intact. No deficits. Cardiology and Neuro following. Pt is alert and orientated. Up with an assist of one with transfers and ambulation. Family at the bedside. Will continue to monitor.

## 2025-01-06 NOTE — PLAN OF CARE
Problem: Adult Inpatient Plan of Care  Goal: Absence of Hospital-Acquired Illness or Injury  Intervention: Prevent Infection  Recent Flowsheet Documentation  Taken 1/5/2025 1610 by Danuta Still RN  Infection Prevention: hand hygiene promoted  Goal: Optimal Comfort and Wellbeing  Intervention: Monitor Pain and Promote Comfort  Recent Flowsheet Documentation  Taken 1/5/2025 2010 by Danuta Still, RN  Pain Management Interventions: rest  Taken 1/5/2025 1756 by Danuta Still RN  Pain Management Interventions:   emotional support   rest   Goal Outcome Evaluation:       A&O X 4 C/O epigastric pain Tylenol was given with relief. CHIO DAWSON LPIV SL. Wife at bedside. Walked on the hallway neuro check Q4 resting in bed.

## 2025-01-06 NOTE — DISCHARGE SUMMARY
"[facility]  Hospitalist Discharge Summary      Date of Admission:  1/3/2025  Date of Discharge:  1/6/2025  Discharging Provider: Benjamin Borden MD  Discharge Service: Hospitalist Service    Discharge Diagnoses   Hypotension / Chest Pain     Clinically Significant Risk Factors     # DMII: A1C = 7.2 % (Ref range: <5.7 %) within past 6 months  # Overweight: Estimated body mass index is 28.86 kg/m  as calculated from the following:    Height as of this encounter: 1.6 m (5' 3\").    Weight as of this encounter: 73.9 kg (162 lb 14.7 oz).       Follow-ups Needed After Discharge   Follow-up Appointments       Hospital Follow-up with Existing Primary Care Provider (PCP)      Please see details below         Schedule Primary Care visit within: 7 Days   Recommended labs and Imaging (to be ordered by Primary Care Provider): CBC / BMP               Discharge Disposition   Discharged to home  Condition at discharge: Stable    Hospital Course   Sedrick Murray is a 74 year old male history of CAD, CKD stage III, diabetes, who presents for evaluation of abdominal pain and positional lightheadedness. Stable here without emergent findings.    #Acute epigastric pain - Resolved  Reports 1 week of epigastric/lower chest pain that worsens with eating, and exertion. On exam, he has TTP of the epigastrium and LUQ which he says is the pain he's here for. HS trop was flat ~50 on admission. Cardiology evaluated, patient has known obstructive CAD with history of hemorrhagic CVA has limited interventions. This pain seemed more likely to be GI in nature. The patient is not anemic. Would treat as GERD and plan for outpatient GI evaluation.  He was discharged on PPI daily, and referred for an outpatient EGD.  - Continued ASA 81mg daily  - Started on  Imdur 30mg daily, will continue at discharge   - Started on pantoprazole and will continue daily     #Positional lightheadedness  Patient describes a week of positional " lightheadedness. No syncope or falls. On admission he was noted to have a pre-renal PANKAJ that improved with IV hydration. BP was 92/50. Suspect that he has been dehydrated from poor PO intake due to his above abdominal pain. In addition, he is on high dose antihypertensives and a diuretic.  - At discharge I have stopped the norvasc and have decreased the cozaar to 25 mg, and the bumex to 0.5 mg  - He also had a HA, and was seen by neurology   - MRI:   1.  No acute or subacute ischemic change.  2.  No acute intracranial hemorrhage or abnormal enhancement.  3.  Probable mild sequela of chronic small vessel ischemic disease and remote infarction.  4.  Mild brain parenchymal volume loss.  5.  Multifocal gradient susceptibility compatible with sequela of remote hemorrhage, could be seen with hypertensive microangiopathy or cerebral amyloid angiopathy.    #PANKAJ on CKD 2  Cr 2.3 on admission from baseline of 1-1.2. Returned to baseline with 1L IVF.  - Losartan and Bumex, as noted above decreased.  Will recheck BMP in 1 week    #History of hemorrhagic CVA  CT head and neck no acute intracranial findings. Chronic left-sided weakness, stable.    #Chronic HFpEF without acute exacerbation, and mod-severe MR on TTE  BNP normal. No peripheral edema. No hypoxemia. Dry on admission with an PANKAJ responsive to IVF.  - Held Bumex as above for dehyration  1. The left ventricle is normal in size. Left ventricular function is  normal.The ejection fraction is 60-65%. There is mild to moderate concentric  left ventricular hypertrophy. No regional wall motion abnormalities noted.  2. Normal right ventricle size and systolic function.  3. There is moderately severe (3+) aortic regurgitation. Highly eccentric jet  and difficult to quantify on current study.  4. Normal RA pressure of 3 mmHg. The aorta root is normal at 38 mm.  - cardiology will follow him after discharge and can monitor is MR and assist with medical management as  well    #Diabetes type 2  - Hold home metformin  - Sliding scale insulin started    #Gout  - Continue home allopurinol    #Incidental lung fibrosis  Noted on CT to have chronic subpleural interstitial changes, likely ILD.  - Pulmonology referral at discharge    Thrombocyteopnia  - Plt count 119 at discharge, has had intermittent low platelts in the past and will get a repeat CBC one week.       Consultations This Hospital Stay   CARDIOLOGY IP CONSULT  NEUROLOGY IP CONSULT    Code Status   Full Code    Time Spent on this Encounter   I, Benjamin Borden MD, personally saw the patient today and spent greater than 30 minutes discharging this patient.       Benjamin Borden MD  [facility]TEND[facility]OV[facility]ORT STAY  [address] [place] [id number]  Phone: [phone]  Fax: [phone]  ______________________________________________________________________    Physical Exam   Vital Signs: Temp: 98  F (36.7  C) Temp src: Oral BP: 134/62 Pulse: 62   Resp: 16 SpO2: 95 % O2 Device: None (Room air)    Weight: 162 lbs 14.72 oz  ----------------------------------------------------------------------------------------       Primary Care Physician   SHASHANK DOUGLAS    Discharge Orders  Also referred to pulmonary      Follow-Up with Cardiology      Adult EDOUARD Cruz Referral - Consult Only      Reason for your hospital stay    Low Blood Pressure     Activity    Your activity upon discharge: activity as tolerated     Diet    Follow this diet upon discharge: Current Diet:Orders Placed This Encounter      Combination Diet Low Saturated Fat Na <2400mg Diet, No Caffeine Diet     Hospital Follow-up with Existing Primary Care Provider (PCP)    Please see details below            Significant Results and Procedures   Most Recent 3 CBC's:  Recent Labs   Lab Test 01/05/25  0605 01/04/25  0704 01/03/25  1433   WBC 4.1 4.0 6.1   HGB 13.9 14.0 15.5   MCV 92 91 92   * 126* 157     Most Recent 3  BMP's:  Recent Labs   Lab Test 01/05/25  0605 01/05/25  0211 01/04/25  0815 01/04/25  0704 01/03/25  1433     --   --  140 140   POTASSIUM 3.9  --   --  3.9 4.6   CHLORIDE 108*  --   --  109* 104   CO2 22  --   --  20* 22   BUN 22.2  --   --  34.7* 48.7*   CR 1.00  --   --  1.18* 2.31*   ANIONGAP 8  --   --  11 14   SAHARA 8.6*  --   --  8.7* 9.5   * 129* 123* 111* 124*   ,   Results for orders placed or performed during the hospital encounter of 01/03/25   XR Chest 2 Views    Narrative    EXAM: XR CHEST 2 VIEWS  LOCATION: [facility]  DATE: 1/3/2025    INDICATION: chest pain  COMPARISON: Chest radiograph 10/17/2024      Impression    IMPRESSION: Bibasilar atelectasis. No pleural effusion or pneumothorax. Stable heart size and mediastinal contours. Degenerative changes of the spine.   CT Head w/o Contrast    Narrative    EXAM: CT HEAD W/O CONTRAST  LOCATION: [facility]  DATE: 1/3/2025    INDICATION: Vision changes  COMPARISON: Head CT 11/03/2024.  TECHNIQUE: Routine CT Head without IV contrast. Multiplanar reformats. Dose reduction techniques were used.    FINDINGS:  INTRACRANIAL CONTENTS: No intracranial hemorrhage, extraaxial collection, or mass effect.  No CT evidence of acute infarct. Chronic lacunar infarct in the left basal ganglia. Mild presumed chronic small vessel ischemic changes. Mild generalized volume   loss. No hydrocephalus.     VISUALIZED ORBITS/SINUSES/MASTOIDS: No intraorbital abnormality. No paranasal sinus mucosal disease. No middle ear or mastoid effusion.    BONES/SOFT TISSUES: No acute abnormality.      Impression    IMPRESSION:  1.  No acute intracranial findings.  2.  Mild age-related changes.   CT Chest w/o Contrast    Narrative    EXAM: CT CHEST W/O CONTRAST  LOCATION: [facility]  DATE: 1/3/2025    INDICATION: Chest pain, SOB, recent treatment for pneumonia.  COMPARISON: CT pulmonary angiogram  10/14/2024  TECHNIQUE: CT chest without IV contrast. Multiplanar reformats were obtained. Dose reduction techniques were used.  CONTRAST: None.    FINDINGS:   LUNGS AND PLEURA: There is stable chronic subpleural interstitial change in the mid and lower lungs.    MEDIASTINUM/AXILLAE: No lymphadenopathy. No thoracic aortic aneurysm. Mild cardiac enlargement.    CORONARY ARTERY CALCIFICATION: Severe.    UPPER ABDOMEN: Cholecystectomy.    MUSCULOSKELETAL: Unremarkable.      Impression    IMPRESSION:   1.  Chronic subpleural interstitial change in the mid and lower lungs, likely interstitial lung disease. No focal consolidation.    2.  Coronary artery calcification.    3.  Cholecystectomy.    4.  Heart is mildly enlarged.    5.  Fatty liver.     MR Brain w/o & w Contrast    Narrative    EXAM: MR BRAIN W/O and W CONTRAST  LOCATION: [facility]  DATE: 1/4/2025    INDICATION: Headache with blurred vision; Headache; Neurologic deficit, non traumatic; Visual symptoms; Neurologic deficit onset <= 24 hours; No known automatically detected potential contraindications to iodinated contrast  COMPARISON: January 3, 2025. MRI head May 28, 2017  CONTRAST: 7mL Gadavist  TECHNIQUE: Routine multiplanar multisequence head MRI without and with intravenous contrast.    FINDINGS:  INTRACRANIAL CONTENTS: No acute or subacute infarct. Remote left occipital infarct Chronic bilateral basal ganglia and thalamic infarcts and hemosiderin staining compatible with remote hemorrhage. Additional small foci of gradient susceptibility are   present within the bilateral cerebral hemispheres and right cerebellum compatible with remote microhemorrhage. No mass, acute hemorrhage, or extra-axial fluid collections. Scattered nonspecific T2/FLAIR hyperintensities within the cerebral white matter   most consistent with mild chronic microvascular ischemic change. Mild generalized cerebral atrophy. No hydrocephalus. Normal position of the  cerebellar tonsils. No pathologic contrast enhancement.    SELLA: No abnormality accounting for technique.    OSSEOUS STRUCTURES/SOFT TISSUES: Normal marrow signal. The major intracranial vascular flow voids are maintained.     ORBITS: No abnormality accounting for technique.     SINUSES/MASTOIDS: Mucosal thickening primarily involving the ethmoid air cells. No middle ear or mastoid effusion.       Impression    IMPRESSION:  1.  No acute or subacute ischemic change.  2.  No acute intracranial hemorrhage or abnormal enhancement.  3.  Probable mild sequela of chronic small vessel ischemic disease and remote infarction.  4.  Mild brain parenchymal volume loss.  5.  Multifocal gradient susceptibility compatible with sequela of remote hemorrhage, could be seen with hypertensive microangiopathy or cerebral amyloid angiopathy.   Echocardiogram Limited     Value    LVEF  60-65%    [facility]    [id number]  [id number]  [id number]  [id number]^LISHA^NELY^[place] [address]     Name: PAYTON BRANDT  MRN: [id number]  : 1950  Study Date: 2025 01:58 PM  Age: 74 yrs  Gender: Male  Patient Location: [facility]  Reason For Study: Abn EKG  Ordering Physician: NELY HUSAIN  Performed By: CHARLES     BSA: 1.8 m2  Height: 63 in  Weight: 176 lb  HR: 63  ______________________________________________________________________________  Procedure  Limited Echocardiogram with two-dimensional, color and spectral Doppler.  ______________________________________________________________________________  Interpretation Summary     1. The left ventricle is normal in size. Left ventricular function is  normal.The ejection fraction is 60-65%. There is mild to moderate concentric  left ventricular hypertrophy. No regional wall motion abnormalities noted.  2. Normal right ventricle size and systolic function.  3. There is moderately severe (3+) aortic regurgitation. Highly eccentric jet  and difficult to  quantify on current study.  4. Normal RA pressure of 3 mmHg. The aorta root is normal at 38 mm.     Consider LOKESH or cardiac MRI to quantify severity of aortic regurgitation.  ______________________________________________________________________________  Left Ventricle  The left ventricle is normal in size. Left ventricular function is normal.The  ejection fraction is 60-65%. There is mild to moderate concentric left  ventricular hypertrophy. No regional wall motion abnormalities noted.     Right Ventricle  Normal right ventricle size and systolic function.     Atria  Normal left atrial size. Right atrial size is normal. There is no color  Doppler evidence of an atrial shunt.     Mitral Valve  Mitral valve leaflets appear normal. There is no evidence of mitral stenosis  or clinically significant mitral regurgitation.     Tricuspid Valve  The tricuspid valve is not well visualized. Right ventricular systolic  pressure could not be approximated due to inadequate tricuspid regurgitation.     Aortic Valve  There is mild trileaflet aortic sclerosis. There is moderately severe (3+)  aortic regurgitation. No aortic stenosis is present.     Pulmonic Valve  The pulmonic valve is not well seen, but is grossly normal. This degree of  valvular regurgitation is within normal limits. There is trace pulmonic  valvular regurgitation.     Vessels  The aorta root is normal. IVC diameter <2.1 cm collapsing >50% with sniff  suggests a normal RA pressure of 3 mmHg.     Pericardium  There is no pericardial effusion.     ______________________________________________________________________________  MMode/2D Measurements & Calculations  IVSd: 1.1 cm  LVIDd: 4.7 cm  LVIDs: 3.0 cm  LVPWd: 1.2 cm     FS: 36.0 %  LV mass(C)d: 197.6 grams  LV mass(C)dI: 107.9 grams/m2  asc Aorta Diam: 3.7 cm  Asc Ao diam index BSA (cm/m2): 2.0  Asc Ao diam index Ht(cm/m): 2.3  EF Biplane: 59.9 %  RWT: 0.51     Time Measurements  Aortic HR: 61.0 BPM  MM HR:  60.0 BPM     Doppler Measurements & Calculations  MV max P.7 mmHg  MV mean P.0 mmHg  MV V2 VTI: 26.4 cm  Ao V2 max: 188.5 cm/sec  Ao max P.0 mmHg  Ao V2 mean: 155.0 cm/sec  Ao mean PG: 10.0 mmHg  Ao V2 VTI: 44.2 cm  AI P1/2t: 516.0 msec     LV V1 max P.2 mmHg  LV V1 max: 143.0 cm/sec  LV V1 VTI: 29.8 cm  AV Noah Ratio (DI): 0.76     ______________________________________________________________________________  Report approved by: John Paul Perkins MD on 2025 03:32 PM             Discharge Medications   Current Discharge Medication List        START taking these medications    Details   pantoprazole (PROTONIX) 40 MG EC tablet Take 1 tablet (40 mg) by mouth daily.  Qty: 30 tablet, Refills: 1    Associated Diagnoses: Dysphagia, unspecified type           CONTINUE these medications which have CHANGED    Details   bumetanide (BUMEX) 0.5 MG tablet Take 1 tablet (0.5 mg) by mouth daily.  Qty: 30 tablet, Refills: 1    Associated Diagnoses: Acute diastolic heart failure (H)      losartan (COZAAR) 25 MG tablet Take 1 tablet (25 mg) by mouth daily.  Qty: 30 tablet, Refills: 1    Associated Diagnoses: Acute diastolic heart failure (H)           CONTINUE these medications which have NOT CHANGED    Details   allopurinol (ZYLOPRIM) 300 MG tablet Take 300 mg by mouth 2 times daily      ASPIRIN LOW DOSE 81 MG chewable tablet Take 81 mg by mouth every morning.      isosorbide mononitrate (IMDUR) 30 MG 24 hr tablet Take 30 mg by mouth every morning.      metFORMIN (GLUCOPHAGE) 500 MG tablet Take 500 mg by mouth 2 times daily (with meals).      nitroGLYcerin (NITROSTAT) 0.4 MG sublingual tablet For chest pain place 1 tablet under the tongue every 5 minutes for 3 doses. If symptoms persist 5 minutes after 1st dose call 911.  Qty: 20 tablet, Refills: 3    Associated Diagnoses: Chest pain, unspecified type; Coronary artery disease due to lipid rich plaque      VITAMIN D3 25 MCG (1000 UT) tablet Take 25 mcg by  mouth every morning.           STOP taking these medications       amLODIPine (NORVASC) 10 MG tablet Comments:   Reason for Stopping:             Allergies   Allergies   Allergen Reactions    Dilaudid [Hydromorphone] Unknown    Phenytoin Hives and Itching

## 2025-01-06 NOTE — PROGRESS NOTES
NEUROLOGY PROGRESS NOTE     Sedrick Murray,  1950, MRN [id number] Date: 2025     [facility]   Code status:  Prior   PCP: Milvia Costa, [phone]      ASSESSMENT & PLAN   Diagnosis code: Headache    Headache with blurred vision and dizziness  Chest pain    Head CT negative for acute structural lesions  MRI brain negative for acute structural lesions.  Does show multifocal gradient susceptibility compatible with sequela of remote hemorrhage.  Treatment of headache with Tylenol.  Will add gabapentin 300 mg 3 times a day to see if it further helps with the headache.  This is helping.  Continue.  IV fluids/supportive care.  Patient's blood pressure is low which can sometimes cause headaches as well- ?  Presyncope  Cardiology involved for management of chest pain  Headache could be a vestibular migraine.    Discharge:-Per primary team.  Will sign off.  Please call with any further questions.       Chief Complaint   Patient presents with    Chest Pain        HISTORY OF PRESENT ILLNESS     We have been requested by dr. Farias to evaluate Sedrick Murray who is a 74 year old  male for evaluation of headaches/blurred vision.  This is a 74-year-old male with history of hemorrhagic stroke, chest pain, anxiety, dyslipidemia, gout who presented with some chest pain radiating to his head causing a headache.  He also complains of associated blurred vision in both eyes.  This is more like blurred vision than loss of vision.  He further complains of room spinning dizziness.  He has had similar symptoms in the past.  Cardiology has evaluated him for chest pain due to history of coronary artery disease.  Troponin was mildly elevated.  Overall they are not concerned about ACS.  Headache is slowly getting better.      Patient's headache is slowly getting better.  Continues to have some blurred vision.  Is requesting for more medication as Tylenol is not completely effective.  MRI completed yesterday.      Headache  is slowly getting better done though not completely resolved.  Still has some blurred vision.  Is finding benefit with the gabapentin.  No side effects.     PAST MEDICAL & SURGICAL HISTORY     Medical History  Past Medical History:   Diagnosis Date    Anxiety     Back pain     Cerebral vascular accident (H)     Chest pain     Chronic pain     Diarrhea     Dyslipidemia 9/1/2014    Gout     Hemorrhagic stroke (H)     Hypertension      Surgical History  Past Surgical History:   Procedure Laterality Date    CHOLECYSTECTOMY      CV CORONARY ANGIOGRAM N/A 4/12/2024    Procedure: Coronary Angiogram;  Surgeon: Phu Delgado MD;  Location: [facility] CATH LAB CV    CV LEFT HEART CATH N/A 4/12/2024    Procedure: Left Heart Catheterization;  Surgeon: Phu Delgado MD;  Location: [facility] CATH LAB CV    IR MISCELLANEOUS PROCEDURE  3/9/2013    IR MISCELLANEOUS PROCEDURE  3/9/2013        SOCIAL HISTORY     Social History     Tobacco Use    Smoking status: Never   Substance Use Topics    Alcohol use: Yes    Drug use: No        FAMILY HISTORY     Reviewed, and family history includes Cerebrovascular Disease in his mother.     ALLERGIES     Allergies   Allergen Reactions    Dilaudid [Hydromorphone] Unknown    Phenytoin Hives and Itching        REVIEW OF SYSTEMS     12 system ROS was done within the HPI this was negative.  Pertinent positives noted in the HPI.     HOME & HOSPITAL MEDICATIONS     Prior to Admission Medications  No medications prior to admission.       Hospital Medications  No current facility-administered medications for this encounter.        PHYSICAL EXAM     Vital signs  Temp:  [97.6  F (36.4  C)-98.8  F (37.1  C)] 98  F (36.7  C)  Pulse:  [60-62] 62  Resp:  [16-18] 16  BP: (134-153)/(62-68) 134/62  SpO2:  [95 %-97 %] 95 %    PHYSICAL EXAMINATION  VITALS: /62 (BP Location: Left arm, Patient Position: Semi-Solorzano's, Cuff Size: Adult Regular)   Pulse 62   Temp 98  F (36.7  C) (Oral)   Resp 16   Ht 1.6  "m (5' 3\")   Wt 73.9 kg (162 lb 14.7 oz)   SpO2 95%   BMI 28.86 kg/m    GENERAL -Health appearing, No apparent distress  EYES- No scleral icterus, no eyelid droop, Pupils - see Neuro section  HEENT - Normocephalic, atraumatic, Hearing grossly intact; Oral mucosa moist and pink in color. External Ears and nose intact.   Neck - supple   PULM - Good spontaneous respiratory effort;   CV-extremities warm and well-perfused.  MSK- Gait - see Neuro section; Strength and tone- see Neuro section; Range of motion grossly intact.  PSYCH -cooperative    Neurological  Mental status - Patient is awake and grossly oriented to self, place and time. Attention span is normal. Language is fluent and follows commands appropriately.   Cranial nerves - CN II-XII intact. Pupils are reactive and symmetric; EOMI, VFIT, NLF symmetric  Motor - There is no pronator drift. Motor exam shows 5/5 strength in all extremities.  Tone - Tone is symmetric bilaterally in upper and lower extremities.  Reflexes - Reflexes are symmetric/decreased.  Sensation - Sensory exam is grossly intact to light touch, pain.  Coordination - Finger to nose and heel to shin is without dysmetria.  Gait and station --needs assistance to ambulate.  Formal gait testing cannot be done due to safety concerns from ongoing medical issues  No change in exam today.     DIAGNOSTIC STUDIES     Pertinent Radiology   CT head  IMPRESSION:  1.  No acute intracranial findings.  2.  Mild age-related changes.    Carotid US 2023  IMPRESSION:    1. Based on the ICA velocities, ICA/CCA ratio, and 2D images there is plaque causing <50% stenosis in the right internal carotid artery and there is plaque causing <50% stenosis in the left internal carotid artery.    2. Normal antegrade flow in the right vertebral artery and No identifiable flow in the left vertebral artery.    3. Multiphasic flow within bilateral subclavian arteries.    4. Right vertebral artery flow: Antegrade       No arterial " flow could be detected in the region of the left vertebral artery.     MRI brain  IMPRESSION:  1.  No acute or subacute ischemic change.  2.  No acute intracranial hemorrhage or abnormal enhancement.  3.  Probable mild sequela of chronic small vessel ischemic disease and remote infarction.  4.  Mild brain parenchymal volume loss.  5.  Multifocal gradient susceptibility compatible with sequela of remote hemorrhage, could be seen with hypertensive microangiopathy or cerebral amyloid angiopathy.    No results found for this or any previous visit (from the past 24 hours).      Total time spent for face to face visit, reviewing labs/imaging studies, counseling and coordination of care was: Over 35 min More than 50% of this time was spent on counseling and coordination of care.    Counseling patient.  Reviewing chart.  Prescription management.    Mika Soler MD  Neurologist  [facility]  Tel:- [phone]

## 2025-01-06 NOTE — PLAN OF CARE
Problem: Adult Inpatient Plan of Care  Goal: Absence of Hospital-Acquired Illness or Injury  Intervention: Prevent Skin Injury  Recent Flowsheet Documentation  Taken 1/6/2025 0530 by Kylee Epps RN  Body Position: position changed independently  Skin Protection: adhesive use limited  Taken 1/6/2025 0130 by Kylee Epps RN  Body Position: position changed independently  Skin Protection: adhesive use limited     Problem: Adult Inpatient Plan of Care  Goal: Absence of Hospital-Acquired Illness or Injury  Intervention: Identify and Manage Fall Risk  Recent Flowsheet Documentation  Taken 1/6/2025 0530 by Kylee Epps RN  Safety Promotion/Fall Prevention: safety round/check completed  Taken 1/6/2025 0130 by Kylee Epps RN  Safety Promotion/Fall Prevention: safety round/check completed   Goal Outcome Evaluation:    Pt A/O, VSS on RA  C/o epigastric pain, Tylenol and tums given  Neuros intact with baseline left- sided weakness  Wife at bedside  Pt able to sleep most of the night

## 2025-01-08 ENCOUNTER — APPOINTMENT (OUTPATIENT)
Dept: INTERPRETER SERVICES | Facility: CLINIC | Age: 75
End: 2025-01-08
Payer: COMMERCIAL

## 2025-01-08 ENCOUNTER — PATIENT OUTREACH (OUTPATIENT)
Dept: CARE COORDINATION | Facility: CLINIC | Age: 75
End: 2025-01-08
Payer: COMMERCIAL

## 2025-01-08 NOTE — PROGRESS NOTES
Connected Care Resource Center:   [facility] Resource Center Contact  [facility]/Voicemail     Clinical Data: Post-Discharge Outreach     Outreach attempted x 2.  Left message on patient's voicemail, providing [facility]'s central phone number of [phone] ([phone]) for questions/concerns and/or to schedule an appt with an [facility] provider, if they do not have a PCP.      Plan:  [facility] will do no further outreaches at this time.       JUAN Meeks  Connected Care Resource [place], [facility]    *Connected Care Resource Team does NOT follow patient ongoing. Referrals are identified based on internal discharge reports and the outreach is to ensure patient has an understanding of their discharge instructions.

## 2025-01-09 ENCOUNTER — TELEPHONE (OUTPATIENT)
Dept: PULMONOLOGY | Facility: CLINIC | Age: 75
End: 2025-01-09
Payer: COMMERCIAL

## 2025-01-09 ENCOUNTER — APPOINTMENT (OUTPATIENT)
Dept: INTERPRETER SERVICES | Facility: CLINIC | Age: 75
End: 2025-01-09
Payer: COMMERCIAL

## 2025-01-09 NOTE — TELEPHONE ENCOUNTER
Left Voicemail (1st Attempt) for the patient to call back and schedule the following:    Appointment type: NPULM REFERRAL  Provider: ANY GEN PULM  Return date: NEXT AVAILABLE  Specialty phone number: [phone]  Additional appointment(s) needed: FPFT  Additonal Notes: N/A

## 2025-01-13 DIAGNOSIS — J84.10 PULMONARY FIBROSIS (H): Primary | ICD-10-CM

## 2025-01-23 NOTE — CONFIDENTIAL NOTE
RECORDS RECEIVED FROM: internal    DATE RECEIVED: 3.7.25    NOTES STATUS DETAILS   OFFICE NOTE from referring provider internal  Per appt notes   Per ILD team review, pt can be scheduled in gen pulm // DX: PUlmonary fibrosis noted on CT, Coronary artery disease due to lipid rich plaque [I25.10, I25.83    DISCHARGE REPORT from the ER internal  10.13.24 Gebreyohannes     4.25.24 shelbie   4.11.24 Rossini     MEDICATION LIST internal     IMAGING  (NEED IMAGES AND REPORTS)     CT SCAN internal  1.3.25, 10.14.24, 4.25.24, 4.11.24   CHEST XRAY (CXR) internal  1.3.25, 10.17.24, 10.14.24, 4.28.24, 4.25.24, 4.14.24   TESTS     PULMONARY FUNCTION TESTING (PFT) internal  Scheduled 3.7.25

## 2025-03-07 ENCOUNTER — PRE VISIT (OUTPATIENT)
Dept: PULMONOLOGY | Facility: CLINIC | Age: 75
End: 2025-03-07

## 2025-04-14 ENCOUNTER — APPOINTMENT (OUTPATIENT)
Dept: INTERPRETER SERVICES | Facility: CLINIC | Age: 75
End: 2025-04-14
Payer: COMMERCIAL

## 2025-04-14 ENCOUNTER — TELEPHONE (OUTPATIENT)
Dept: CARDIOLOGY | Facility: HOSPITAL | Age: 75
End: 2025-04-14
Payer: COMMERCIAL

## 2025-08-07 ENCOUNTER — HOSPITAL ENCOUNTER (INPATIENT)
Facility: HOSPITAL | Age: 75
DRG: 269 | End: 2025-08-07
Attending: STUDENT IN AN ORGANIZED HEALTH CARE EDUCATION/TRAINING PROGRAM
Payer: COMMERCIAL

## 2025-08-07 ENCOUNTER — APPOINTMENT (OUTPATIENT)
Dept: CARDIOLOGY | Facility: HOSPITAL | Age: 75
DRG: 268 | End: 2025-08-07
Payer: COMMERCIAL

## 2025-08-07 ENCOUNTER — APPOINTMENT (OUTPATIENT)
Dept: CT IMAGING | Facility: HOSPITAL | Age: 75
DRG: 268 | End: 2025-08-07
Attending: STUDENT IN AN ORGANIZED HEALTH CARE EDUCATION/TRAINING PROGRAM
Payer: COMMERCIAL

## 2025-08-07 ENCOUNTER — TELEPHONE (OUTPATIENT)
Dept: VASCULAR SURGERY | Facility: CLINIC | Age: 75
End: 2025-08-07

## 2025-08-07 VITALS
HEIGHT: 63 IN | RESPIRATION RATE: 13 BRPM | BODY MASS INDEX: 29.91 KG/M2 | OXYGEN SATURATION: 96 % | WEIGHT: 168.8 LBS | HEART RATE: 62 BPM | SYSTOLIC BLOOD PRESSURE: 131 MMHG | DIASTOLIC BLOOD PRESSURE: 63 MMHG | TEMPERATURE: 98 F

## 2025-08-07 DIAGNOSIS — I71.43 INFRARENAL ABDOMINAL AORTIC ANEURYSM (AAA) WITHOUT RUPTURE: ICD-10-CM

## 2025-08-07 DIAGNOSIS — I71.40 ABDOMINAL AORTIC ANEURYSM (AAA), UNSPECIFIED PART, UNSPECIFIED WHETHER RUPTURED: Primary | ICD-10-CM

## 2025-08-07 DIAGNOSIS — I25.10 CORONARY ARTERY DISEASE DUE TO LIPID RICH PLAQUE: Chronic | ICD-10-CM

## 2025-08-07 DIAGNOSIS — R06.02 SOB (SHORTNESS OF BREATH): Primary | ICD-10-CM

## 2025-08-07 DIAGNOSIS — I25.83 CORONARY ARTERY DISEASE DUE TO LIPID RICH PLAQUE: Chronic | ICD-10-CM

## 2025-08-07 LAB
ABO + RH BLD: NORMAL
ALBUMIN SERPL BCG-MCNC: 3.5 G/DL (ref 3.5–5.2)
ALBUMIN UR-MCNC: NEGATIVE MG/DL
ALP SERPL-CCNC: 83 U/L (ref 40–150)
ALT SERPL W P-5'-P-CCNC: 35 U/L (ref 0–70)
ANION GAP SERPL CALCULATED.3IONS-SCNC: 12 MMOL/L (ref 7–15)
APPEARANCE UR: CLEAR
AST SERPL W P-5'-P-CCNC: 44 U/L (ref 0–45)
BILIRUB SERPL-MCNC: 0.3 MG/DL
BILIRUB UR QL STRIP: NEGATIVE
BLD GP AB SCN SERPL QL: NEGATIVE
BUN SERPL-MCNC: 17.8 MG/DL (ref 8–23)
CALCIUM SERPL-MCNC: 8.4 MG/DL (ref 8.8–10.4)
CHLORIDE SERPL-SCNC: 110 MMOL/L (ref 98–107)
COLOR UR AUTO: COLORLESS
CREAT SERPL-MCNC: 0.82 MG/DL (ref 0.67–1.17)
EGFRCR SERPLBLD CKD-EPI 2021: >90 ML/MIN/1.73M2
ERYTHROCYTE [DISTWIDTH] IN BLOOD BY AUTOMATED COUNT: 13.1 % (ref 10–15)
GLUCOSE BLDC GLUCOMTR-MCNC: 119 MG/DL (ref 70–99)
GLUCOSE BLDC GLUCOMTR-MCNC: 176 MG/DL (ref 70–99)
GLUCOSE SERPL-MCNC: 102 MG/DL (ref 70–99)
GLUCOSE UR STRIP-MCNC: NEGATIVE MG/DL
HCO3 SERPL-SCNC: 20 MMOL/L (ref 22–29)
HCT VFR BLD AUTO: 46.7 % (ref 40–53)
HGB BLD-MCNC: 14.7 G/DL (ref 13.3–17.7)
HGB UR QL STRIP: NEGATIVE
HOLD SPECIMEN: NORMAL
HOLD SPECIMEN: NORMAL
KETONES UR STRIP-MCNC: NEGATIVE MG/DL
LEUKOCYTE ESTERASE UR QL STRIP: NEGATIVE
LVEF ECHO: NORMAL
MCH RBC QN AUTO: 29.8 PG (ref 26.5–33)
MCHC RBC AUTO-ENTMCNC: 31.5 G/DL (ref 31.5–36.5)
MCV RBC AUTO: 95 FL (ref 78–100)
NITRATE UR QL: NEGATIVE
NT-PROBNP SERPL-MCNC: 1088 PG/ML (ref 0–229)
PH UR STRIP: 6.5 [PH] (ref 5–7)
PLATELET # BLD AUTO: 152 10E3/UL (ref 150–450)
POTASSIUM SERPL-SCNC: 4.5 MMOL/L (ref 3.4–5.3)
PROT SERPL-MCNC: 5.9 G/DL (ref 6.4–8.3)
RBC # BLD AUTO: 4.94 10E6/UL (ref 4.4–5.9)
RBC URINE: 1 /HPF
SODIUM SERPL-SCNC: 142 MMOL/L (ref 135–145)
SP GR UR STRIP: 1.02 (ref 1–1.03)
SPECIMEN EXP DATE BLD: NORMAL
TROPONIN T SERPL HS-MCNC: 32 NG/L
TROPONIN T SERPL HS-MCNC: 33 NG/L
UROBILINOGEN UR STRIP-MCNC: NORMAL MG/DL
WBC # BLD AUTO: 4.9 10E3/UL (ref 4–11)
WBC URINE: 0 /HPF

## 2025-08-07 PROCEDURE — 250N000013 HC RX MED GY IP 250 OP 250 PS 637

## 2025-08-07 PROCEDURE — 36415 COLL VENOUS BLD VENIPUNCTURE: CPT | Performed by: STUDENT IN AN ORGANIZED HEALTH CARE EDUCATION/TRAINING PROGRAM

## 2025-08-07 PROCEDURE — 93306 TTE W/DOPPLER COMPLETE: CPT | Mod: 26 | Performed by: INTERNAL MEDICINE

## 2025-08-07 PROCEDURE — 99207 PR APP CREDIT; MD BILLING SHARED VISIT: CPT | Mod: FS

## 2025-08-07 PROCEDURE — 86900 BLOOD TYPING SEROLOGIC ABO: CPT | Performed by: SURGERY

## 2025-08-07 PROCEDURE — 74174 CTA ABD&PLVS W/CONTRAST: CPT

## 2025-08-07 PROCEDURE — 99285 EMERGENCY DEPT VISIT HI MDM: CPT | Mod: 25 | Performed by: STUDENT IN AN ORGANIZED HEALTH CARE EDUCATION/TRAINING PROGRAM

## 2025-08-07 PROCEDURE — 96365 THER/PROPH/DIAG IV INF INIT: CPT | Mod: 59

## 2025-08-07 PROCEDURE — 200N000001 HC R&B ICU

## 2025-08-07 PROCEDURE — 82435 ASSAY OF BLOOD CHLORIDE: CPT | Performed by: STUDENT IN AN ORGANIZED HEALTH CARE EDUCATION/TRAINING PROGRAM

## 2025-08-07 PROCEDURE — 93005 ELECTROCARDIOGRAM TRACING: CPT | Performed by: STUDENT IN AN ORGANIZED HEALTH CARE EDUCATION/TRAINING PROGRAM

## 2025-08-07 PROCEDURE — 83880 ASSAY OF NATRIURETIC PEPTIDE: CPT | Performed by: STUDENT IN AN ORGANIZED HEALTH CARE EDUCATION/TRAINING PROGRAM

## 2025-08-07 PROCEDURE — 250N000013 HC RX MED GY IP 250 OP 250 PS 637: Performed by: INTERNAL MEDICINE

## 2025-08-07 PROCEDURE — 36415 COLL VENOUS BLD VENIPUNCTURE: CPT | Performed by: SURGERY

## 2025-08-07 PROCEDURE — 93306 TTE W/DOPPLER COMPLETE: CPT

## 2025-08-07 PROCEDURE — 250N000011 HC RX IP 250 OP 636: Performed by: STUDENT IN AN ORGANIZED HEALTH CARE EDUCATION/TRAINING PROGRAM

## 2025-08-07 PROCEDURE — 250N000012 HC RX MED GY IP 250 OP 636 PS 637

## 2025-08-07 PROCEDURE — 250N000011 HC RX IP 250 OP 636

## 2025-08-07 PROCEDURE — 81001 URINALYSIS AUTO W/SCOPE: CPT | Performed by: SURGERY

## 2025-08-07 PROCEDURE — 87040 BLOOD CULTURE FOR BACTERIA: CPT | Performed by: SURGERY

## 2025-08-07 PROCEDURE — 99223 1ST HOSP IP/OBS HIGH 75: CPT | Mod: FS | Performed by: EMERGENCY MEDICINE

## 2025-08-07 PROCEDURE — 85014 HEMATOCRIT: CPT | Performed by: STUDENT IN AN ORGANIZED HEALTH CARE EDUCATION/TRAINING PROGRAM

## 2025-08-07 PROCEDURE — 84484 ASSAY OF TROPONIN QUANT: CPT | Performed by: STUDENT IN AN ORGANIZED HEALTH CARE EDUCATION/TRAINING PROGRAM

## 2025-08-07 RX ORDER — ISOSORBIDE MONONITRATE 30 MG/1
30 TABLET, EXTENDED RELEASE ORAL EVERY MORNING
Status: DISCONTINUED | OUTPATIENT
Start: 2025-08-07 | End: 2025-08-10 | Stop reason: HOSPADM

## 2025-08-07 RX ORDER — ALLOPURINOL 300 MG/1
300 TABLET ORAL 2 TIMES DAILY
Status: DISCONTINUED | OUTPATIENT
Start: 2025-08-07 | End: 2025-08-10 | Stop reason: HOSPADM

## 2025-08-07 RX ORDER — ONDANSETRON 4 MG/1
4 TABLET, ORALLY DISINTEGRATING ORAL EVERY 6 HOURS PRN
Status: DISCONTINUED | OUTPATIENT
Start: 2025-08-07 | End: 2025-08-10 | Stop reason: HOSPADM

## 2025-08-07 RX ORDER — NALOXONE HYDROCHLORIDE 0.4 MG/ML
0.2 INJECTION, SOLUTION INTRAMUSCULAR; INTRAVENOUS; SUBCUTANEOUS
Status: DISCONTINUED | OUTPATIENT
Start: 2025-08-07 | End: 2025-08-10 | Stop reason: HOSPADM

## 2025-08-07 RX ORDER — NICOTINE POLACRILEX 4 MG
15-30 LOZENGE BUCCAL
Status: DISCONTINUED | OUTPATIENT
Start: 2025-08-07 | End: 2025-08-10 | Stop reason: HOSPADM

## 2025-08-07 RX ORDER — NICOTINE POLACRILEX 4 MG
15-30 LOZENGE BUCCAL
Status: DISCONTINUED | OUTPATIENT
Start: 2025-08-07 | End: 2025-08-07

## 2025-08-07 RX ORDER — DEXTROSE MONOHYDRATE 25 G/50ML
25-50 INJECTION, SOLUTION INTRAVENOUS
Status: DISCONTINUED | OUTPATIENT
Start: 2025-08-07 | End: 2025-08-07

## 2025-08-07 RX ORDER — ONDANSETRON 2 MG/ML
4 INJECTION INTRAMUSCULAR; INTRAVENOUS ONCE
Status: COMPLETED | OUTPATIENT
Start: 2025-08-07 | End: 2025-08-07

## 2025-08-07 RX ORDER — MORPHINE SULFATE 4 MG/ML
4 INJECTION, SOLUTION INTRAMUSCULAR; INTRAVENOUS ONCE
Refills: 0 | Status: COMPLETED | OUTPATIENT
Start: 2025-08-07 | End: 2025-08-07

## 2025-08-07 RX ORDER — ACETAMINOPHEN 325 MG/1
650 TABLET ORAL EVERY 4 HOURS PRN
Status: DISCONTINUED | OUTPATIENT
Start: 2025-08-07 | End: 2025-08-10 | Stop reason: HOSPADM

## 2025-08-07 RX ORDER — ASPIRIN 81 MG/1
81 TABLET, CHEWABLE ORAL EVERY MORNING
Status: DISCONTINUED | OUTPATIENT
Start: 2025-08-08 | End: 2025-08-10 | Stop reason: HOSPADM

## 2025-08-07 RX ORDER — SIMETHICONE 80 MG
80 TABLET,CHEWABLE ORAL EVERY 6 HOURS PRN
Status: DISCONTINUED | OUTPATIENT
Start: 2025-08-07 | End: 2025-08-10 | Stop reason: HOSPADM

## 2025-08-07 RX ORDER — METOPROLOL TARTRATE 1 MG/ML
5 INJECTION, SOLUTION INTRAVENOUS ONCE
Status: DISCONTINUED | OUTPATIENT
Start: 2025-08-07 | End: 2025-08-07

## 2025-08-07 RX ORDER — NALOXONE HYDROCHLORIDE 0.4 MG/ML
0.4 INJECTION, SOLUTION INTRAMUSCULAR; INTRAVENOUS; SUBCUTANEOUS
Status: DISCONTINUED | OUTPATIENT
Start: 2025-08-07 | End: 2025-08-10 | Stop reason: HOSPADM

## 2025-08-07 RX ORDER — ACETAMINOPHEN 325 MG/10.15ML
650 LIQUID ORAL EVERY 4 HOURS PRN
Status: DISCONTINUED | OUTPATIENT
Start: 2025-08-07 | End: 2025-08-10 | Stop reason: HOSPADM

## 2025-08-07 RX ORDER — METOPROLOL SUCCINATE 25 MG/1
25 TABLET, EXTENDED RELEASE ORAL DAILY
Status: DISCONTINUED | OUTPATIENT
Start: 2025-08-07 | End: 2025-08-09

## 2025-08-07 RX ORDER — METFORMIN HYDROCHLORIDE 500 MG/1
500 TABLET, EXTENDED RELEASE ORAL 2 TIMES DAILY WITH MEALS
COMMUNITY
Start: 2025-05-23

## 2025-08-07 RX ORDER — PANTOPRAZOLE SODIUM 40 MG/1
40 TABLET, DELAYED RELEASE ORAL
Status: DISCONTINUED | OUTPATIENT
Start: 2025-08-07 | End: 2025-08-10 | Stop reason: HOSPADM

## 2025-08-07 RX ORDER — PROCHLORPERAZINE MALEATE 5 MG/1
5 TABLET ORAL EVERY 6 HOURS PRN
Status: DISCONTINUED | OUTPATIENT
Start: 2025-08-07 | End: 2025-08-10 | Stop reason: HOSPADM

## 2025-08-07 RX ORDER — ROSUVASTATIN CALCIUM 40 MG/1
40 TABLET, COATED ORAL AT BEDTIME
Status: DISCONTINUED | OUTPATIENT
Start: 2025-08-07 | End: 2025-08-10 | Stop reason: HOSPADM

## 2025-08-07 RX ORDER — IOPAMIDOL 755 MG/ML
90 INJECTION, SOLUTION INTRAVASCULAR ONCE
Status: COMPLETED | OUTPATIENT
Start: 2025-08-07 | End: 2025-08-07

## 2025-08-07 RX ORDER — CEFAZOLIN SODIUM/WATER 2 G/20 ML
2 SYRINGE (ML) INTRAVENOUS
Status: COMPLETED | OUTPATIENT
Start: 2025-08-07 | End: 2025-08-08

## 2025-08-07 RX ORDER — DEXTROSE MONOHYDRATE 25 G/50ML
25-50 INJECTION, SOLUTION INTRAVENOUS
Status: DISCONTINUED | OUTPATIENT
Start: 2025-08-07 | End: 2025-08-10 | Stop reason: HOSPADM

## 2025-08-07 RX ORDER — CEFAZOLIN SODIUM/WATER 2 G/20 ML
2 SYRINGE (ML) INTRAVENOUS SEE ADMIN INSTRUCTIONS
Status: DISCONTINUED | OUTPATIENT
Start: 2025-08-07 | End: 2025-08-08 | Stop reason: HOSPADM

## 2025-08-07 RX ORDER — LOSARTAN POTASSIUM 50 MG/1
50 TABLET ORAL DAILY
Status: DISCONTINUED | OUTPATIENT
Start: 2025-08-07 | End: 2025-08-10

## 2025-08-07 RX ORDER — METFORMIN HYDROCHLORIDE 500 MG/1
500 TABLET, EXTENDED RELEASE ORAL 2 TIMES DAILY WITH MEALS
Status: DISCONTINUED | OUTPATIENT
Start: 2025-08-07 | End: 2025-08-10 | Stop reason: HOSPADM

## 2025-08-07 RX ORDER — OXYCODONE HYDROCHLORIDE 5 MG/1
5 TABLET ORAL EVERY 4 HOURS PRN
Refills: 0 | Status: DISCONTINUED | OUTPATIENT
Start: 2025-08-07 | End: 2025-08-10 | Stop reason: HOSPADM

## 2025-08-07 RX ORDER — BUMETANIDE 0.5 MG/1
0.5 TABLET ORAL DAILY
Status: DISCONTINUED | OUTPATIENT
Start: 2025-08-07 | End: 2025-08-10 | Stop reason: HOSPADM

## 2025-08-07 RX ORDER — MORPHINE SULFATE 4 MG/ML
4 INJECTION, SOLUTION INTRAMUSCULAR; INTRAVENOUS EVERY 4 HOURS PRN
Status: DISCONTINUED | OUTPATIENT
Start: 2025-08-07 | End: 2025-08-10 | Stop reason: HOSPADM

## 2025-08-07 RX ORDER — LOSARTAN POTASSIUM 50 MG/1
50 TABLET ORAL DAILY
COMMUNITY
Start: 2025-05-23

## 2025-08-07 RX ORDER — ONDANSETRON 2 MG/ML
4 INJECTION INTRAMUSCULAR; INTRAVENOUS EVERY 6 HOURS PRN
Status: DISCONTINUED | OUTPATIENT
Start: 2025-08-07 | End: 2025-08-10 | Stop reason: HOSPADM

## 2025-08-07 RX ADMIN — NICARDIPINE HYDROCHLORIDE 7.5 MG/HR: 0.2 INJECTION, SOLUTION INTRAVENOUS at 15:05

## 2025-08-07 RX ADMIN — SIMETHICONE 80 MG: 80 TABLET, CHEWABLE ORAL at 21:00

## 2025-08-07 RX ADMIN — NICARDIPINE HYDROCHLORIDE 6 MG/HR: 0.2 INJECTION, SOLUTION INTRAVENOUS at 20:33

## 2025-08-07 RX ADMIN — INSULIN ASPART 1 UNITS: 100 INJECTION, SOLUTION INTRAVENOUS; SUBCUTANEOUS at 17:19

## 2025-08-07 RX ADMIN — MORPHINE SULFATE 4 MG: 4 INJECTION, SOLUTION INTRAMUSCULAR; INTRAVENOUS at 08:13

## 2025-08-07 RX ADMIN — IOPAMIDOL 90 ML: 755 INJECTION, SOLUTION INTRAVENOUS at 06:03

## 2025-08-07 RX ADMIN — PANTOPRAZOLE SODIUM 40 MG: 40 TABLET, DELAYED RELEASE ORAL at 14:05

## 2025-08-07 RX ADMIN — NICARDIPINE HYDROCHLORIDE 2.5 MG/HR: 0.2 INJECTION, SOLUTION INTRAVENOUS at 06:42

## 2025-08-07 RX ADMIN — ONDANSETRON 4 MG: 2 INJECTION, SOLUTION INTRAMUSCULAR; INTRAVENOUS at 08:10

## 2025-08-07 RX ADMIN — OXYCODONE HYDROCHLORIDE 2.5 MG: 5 TABLET ORAL at 16:11

## 2025-08-07 RX ADMIN — MORPHINE SULFATE 4 MG: 4 INJECTION, SOLUTION INTRAMUSCULAR; INTRAVENOUS at 20:33

## 2025-08-07 RX ADMIN — ALLOPURINOL 300 MG: 300 TABLET ORAL at 21:00

## 2025-08-07 RX ADMIN — ROSUVASTATIN 40 MG: 40 TABLET, FILM COATED ORAL at 21:00

## 2025-08-07 ASSESSMENT — ACTIVITIES OF DAILY LIVING (ADL)
ADLS_ACUITY_SCORE: 58
ADLS_ACUITY_SCORE: 49
ADLS_ACUITY_SCORE: 57
ADLS_ACUITY_SCORE: 49
ADLS_ACUITY_SCORE: 57
ADLS_ACUITY_SCORE: 57
ADLS_ACUITY_SCORE: 49
ADLS_ACUITY_SCORE: 57
ADLS_ACUITY_SCORE: 49

## 2025-08-07 ASSESSMENT — COLUMBIA-SUICIDE SEVERITY RATING SCALE - C-SSRS
1. IN THE PAST MONTH, HAVE YOU WISHED YOU WERE DEAD OR WISHED YOU COULD GO TO SLEEP AND NOT WAKE UP?: NO
6. HAVE YOU EVER DONE ANYTHING, STARTED TO DO ANYTHING, OR PREPARED TO DO ANYTHING TO END YOUR LIFE?: NO
2. HAVE YOU ACTUALLY HAD ANY THOUGHTS OF KILLING YOURSELF IN THE PAST MONTH?: NO

## 2025-08-08 ENCOUNTER — HOSPITAL ENCOUNTER (INPATIENT)
Dept: INTERVENTIONAL RADIOLOGY/VASCULAR | Facility: HOSPITAL | Age: 75
Discharge: HOME | DRG: 268 | End: 2025-08-08
Attending: SURGERY
Payer: COMMERCIAL

## 2025-08-08 ENCOUNTER — APPOINTMENT (OUTPATIENT)
Dept: RADIOLOGY | Facility: HOSPITAL | Age: 75
DRG: 268 | End: 2025-08-08
Attending: SURGERY
Payer: COMMERCIAL

## 2025-08-08 DIAGNOSIS — I71.40 ABDOMINAL AORTIC ANEURYSM (AAA), UNSPECIFIED PART, UNSPECIFIED WHETHER RUPTURED: ICD-10-CM

## 2025-08-08 LAB
ANION GAP SERPL CALCULATED.3IONS-SCNC: 11 MMOL/L (ref 7–15)
ATRIAL RATE - MUSE: 62 BPM
BASE EXCESS BLDA CALC-SCNC: -1 MMOL/L (ref -3–3)
BUN SERPL-MCNC: 15 MG/DL (ref 8–23)
CALCIUM SERPL-MCNC: 8.6 MG/DL (ref 8.8–10.4)
CHLORIDE SERPL-SCNC: 105 MMOL/L (ref 98–107)
CPB POCT: NO
CREAT SERPL-MCNC: 0.88 MG/DL (ref 0.67–1.17)
DIASTOLIC BLOOD PRESSURE - MUSE: 83 MMHG
EGFRCR SERPLBLD CKD-EPI 2021: 90 ML/MIN/1.73M2
ERYTHROCYTE [DISTWIDTH] IN BLOOD BY AUTOMATED COUNT: 12.7 % (ref 10–15)
GLUCOSE BLDC GLUCOMTR-MCNC: 113 MG/DL (ref 70–99)
GLUCOSE BLDC GLUCOMTR-MCNC: 133 MG/DL (ref 70–99)
GLUCOSE BLDC GLUCOMTR-MCNC: 135 MG/DL (ref 70–99)
GLUCOSE BLDC GLUCOMTR-MCNC: 141 MG/DL (ref 70–99)
GLUCOSE BLDC GLUCOMTR-MCNC: 186 MG/DL (ref 70–99)
GLUCOSE BLDC GLUCOMTR-MCNC: 222 MG/DL (ref 70–99)
GLUCOSE SERPL-MCNC: 146 MG/DL (ref 70–99)
HCO3 BLDA-SCNC: 24 MMOL/L (ref 21–28)
HCO3 SERPL-SCNC: 24 MMOL/L (ref 22–29)
HCT VFR BLD AUTO: 49.6 % (ref 40–53)
HCT VFR BLD CALC: 45 % (ref 40–53)
HGB BLD-MCNC: 15.3 G/DL (ref 13.3–17.7)
HGB BLD-MCNC: 15.3 G/DL (ref 13.3–17.7)
INTERPRETATION ECG - MUSE: NORMAL
MCH RBC QN AUTO: 29.1 PG (ref 26.5–33)
MCHC RBC AUTO-ENTMCNC: 30.8 G/DL (ref 31.5–36.5)
MCV RBC AUTO: 95 FL (ref 78–100)
P AXIS - MUSE: 24 DEGREES
PCO2 BLDA: 38 MM HG (ref 35–45)
PH BLDA: 7.4 [PH] (ref 7.35–7.45)
PLAT MORPH BLD: NORMAL
PLATELET # BLD AUTO: 119 10E3/UL (ref 150–450)
PO2 BLDA: 97 MM HG (ref 80–105)
POTASSIUM BLD-SCNC: 3.9 MMOL/L (ref 3.4–5.3)
POTASSIUM SERPL-SCNC: 4.1 MMOL/L (ref 3.4–5.3)
PR INTERVAL - MUSE: 180 MS
QRS DURATION - MUSE: 96 MS
QT - MUSE: 426 MS
QTC - MUSE: 432 MS
R AXIS - MUSE: -14 DEGREES
RBC # BLD AUTO: 5.25 10E6/UL (ref 4.4–5.9)
RBC MORPH BLD: NORMAL
SAO2 % BLDA: 98 % (ref 95–96)
SODIUM BLD-SCNC: 140 MMOL/L (ref 135–145)
SODIUM SERPL-SCNC: 140 MMOL/L (ref 135–145)
SYSTOLIC BLOOD PRESSURE - MUSE: 187 MMHG
T AXIS - MUSE: 83 DEGREES
VENTRICULAR RATE- MUSE: 62 BPM
WBC # BLD AUTO: 7.2 10E3/UL (ref 4–11)

## 2025-08-08 PROCEDURE — C1887 CATHETER, GUIDING: HCPCS | Performed by: SURGERY

## 2025-08-08 PROCEDURE — 250N000011 HC RX IP 250 OP 636

## 2025-08-08 PROCEDURE — 250N000013 HC RX MED GY IP 250 OP 250 PS 637

## 2025-08-08 PROCEDURE — 82803 BLOOD GASES ANY COMBINATION: CPT

## 2025-08-08 PROCEDURE — 34705 EVAC RPR A-BIILIAC NDGFT: CPT | Mod: GC | Performed by: SURGERY

## 2025-08-08 PROCEDURE — 360N000084 HC SURGERY LEVEL 4 W/ FLUORO, PER MIN: Performed by: SURGERY

## 2025-08-08 PROCEDURE — C1769 GUIDE WIRE: HCPCS | Performed by: SURGERY

## 2025-08-08 PROCEDURE — 34713 PERQ ACCESS & CLSR FEM ART: CPT | Mod: LT | Performed by: SURGERY

## 2025-08-08 PROCEDURE — 272N000002 HC OR SUPPLY OTHER OPNP: Performed by: SURGERY

## 2025-08-08 PROCEDURE — 99233 SBSQ HOSP IP/OBS HIGH 50: CPT | Performed by: EMERGENCY MEDICINE

## 2025-08-08 PROCEDURE — 258N000003 HC RX IP 258 OP 636: Performed by: ANESTHESIOLOGY

## 2025-08-08 PROCEDURE — 710N000010 HC RECOVERY PHASE 1, LEVEL 2, PER MIN: Performed by: SURGERY

## 2025-08-08 PROCEDURE — 250N000009 HC RX 250: Performed by: SURGERY

## 2025-08-08 PROCEDURE — 999N000141 HC STATISTIC PRE-PROCEDURE NURSING ASSESSMENT: Performed by: SURGERY

## 2025-08-08 PROCEDURE — 999N000182 XR SURGERY CARM FLUORO GREATER THAN 5 MIN

## 2025-08-08 PROCEDURE — C1768 GRAFT, VASCULAR: HCPCS | Performed by: SURGERY

## 2025-08-08 PROCEDURE — 85027 COMPLETE CBC AUTOMATED: CPT

## 2025-08-08 PROCEDURE — 200N000001 HC R&B ICU

## 2025-08-08 PROCEDURE — C1894 INTRO/SHEATH, NON-LASER: HCPCS | Performed by: SURGERY

## 2025-08-08 PROCEDURE — 85347 COAGULATION TIME ACTIVATED: CPT

## 2025-08-08 PROCEDURE — 255N000002 HC RX 255 OP 636: Performed by: SURGERY

## 2025-08-08 PROCEDURE — 999N000083 IR OR ANGIOGRAM

## 2025-08-08 PROCEDURE — 250N000025 HC SEVOFLURANE, PER MIN: Performed by: SURGERY

## 2025-08-08 PROCEDURE — 250N000009 HC RX 250: Performed by: ANESTHESIOLOGY

## 2025-08-08 PROCEDURE — 36415 COLL VENOUS BLD VENIPUNCTURE: CPT

## 2025-08-08 PROCEDURE — 250N000011 HC RX IP 250 OP 636: Performed by: SURGERY

## 2025-08-08 PROCEDURE — 250N000011 HC RX IP 250 OP 636: Performed by: EMERGENCY MEDICINE

## 2025-08-08 PROCEDURE — 272N000001 HC OR GENERAL SUPPLY STERILE: Performed by: SURGERY

## 2025-08-08 PROCEDURE — C1725 CATH, TRANSLUMIN NON-LASER: HCPCS | Performed by: SURGERY

## 2025-08-08 PROCEDURE — 370N000017 HC ANESTHESIA TECHNICAL FEE, PER MIN: Performed by: SURGERY

## 2025-08-08 PROCEDURE — 80048 BASIC METABOLIC PNL TOTAL CA: CPT

## 2025-08-08 PROCEDURE — C1760 CLOSURE DEV, VASC: HCPCS | Performed by: SURGERY

## 2025-08-08 PROCEDURE — 04V03DZ RESTRICTION OF ABDOMINAL AORTA WITH INTRALUMINAL DEVICE, PERCUTANEOUS APPROACH: ICD-10-PCS | Performed by: SURGERY

## 2025-08-08 DEVICE — IMPLANTABLE DEVICE: Type: IMPLANTABLE DEVICE | Site: AORTA | Status: FUNCTIONAL

## 2025-08-08 DEVICE — IMPLANTABLE DEVICE: Type: IMPLANTABLE DEVICE | Site: ILIAC/FEMORALS | Status: FUNCTIONAL

## 2025-08-08 RX ORDER — FENTANYL CITRATE 50 UG/ML
50 INJECTION, SOLUTION INTRAMUSCULAR; INTRAVENOUS EVERY 5 MIN PRN
Status: DISCONTINUED | OUTPATIENT
Start: 2025-08-08 | End: 2025-08-08

## 2025-08-08 RX ORDER — DEXAMETHASONE SODIUM PHOSPHATE 4 MG/ML
4 INJECTION, SOLUTION INTRA-ARTICULAR; INTRALESIONAL; INTRAMUSCULAR; INTRAVENOUS; SOFT TISSUE
Status: DISCONTINUED | OUTPATIENT
Start: 2025-08-08 | End: 2025-08-08

## 2025-08-08 RX ORDER — ONDANSETRON 2 MG/ML
4 INJECTION INTRAMUSCULAR; INTRAVENOUS EVERY 30 MIN PRN
Status: DISCONTINUED | OUTPATIENT
Start: 2025-08-08 | End: 2025-08-08

## 2025-08-08 RX ORDER — POLYETHYLENE GLYCOL 3350 17 G/17G
17 POWDER, FOR SOLUTION ORAL DAILY
Status: DISCONTINUED | OUTPATIENT
Start: 2025-08-09 | End: 2025-08-10 | Stop reason: HOSPADM

## 2025-08-08 RX ORDER — HEPARIN SODIUM 200 [USP'U]/100ML
INJECTION, SOLUTION INTRAVENOUS PRN
Status: DISCONTINUED | OUTPATIENT
Start: 2025-08-08 | End: 2025-08-08 | Stop reason: HOSPADM

## 2025-08-08 RX ORDER — OXYCODONE HYDROCHLORIDE 5 MG/1
10 TABLET ORAL EVERY 4 HOURS PRN
Status: DISCONTINUED | OUTPATIENT
Start: 2025-08-08 | End: 2025-08-10 | Stop reason: HOSPADM

## 2025-08-08 RX ORDER — LIDOCAINE 40 MG/G
CREAM TOPICAL
Status: DISCONTINUED | OUTPATIENT
Start: 2025-08-08 | End: 2025-08-08 | Stop reason: HOSPADM

## 2025-08-08 RX ORDER — OXYCODONE HYDROCHLORIDE 5 MG/1
5 TABLET ORAL EVERY 4 HOURS PRN
Status: DISCONTINUED | OUTPATIENT
Start: 2025-08-08 | End: 2025-08-10 | Stop reason: HOSPADM

## 2025-08-08 RX ORDER — SODIUM CHLORIDE, SODIUM LACTATE, POTASSIUM CHLORIDE, CALCIUM CHLORIDE 600; 310; 30; 20 MG/100ML; MG/100ML; MG/100ML; MG/100ML
INJECTION, SOLUTION INTRAVENOUS CONTINUOUS
Status: DISCONTINUED | OUTPATIENT
Start: 2025-08-08 | End: 2025-08-09

## 2025-08-08 RX ORDER — ACETAMINOPHEN 325 MG/1
975 TABLET ORAL EVERY 8 HOURS
Status: DISCONTINUED | OUTPATIENT
Start: 2025-08-08 | End: 2025-08-10 | Stop reason: HOSPADM

## 2025-08-08 RX ORDER — LIDOCAINE 40 MG/G
CREAM TOPICAL
Status: DISCONTINUED | OUTPATIENT
Start: 2025-08-08 | End: 2025-08-10 | Stop reason: HOSPADM

## 2025-08-08 RX ORDER — FENTANYL CITRATE 50 UG/ML
25 INJECTION, SOLUTION INTRAMUSCULAR; INTRAVENOUS EVERY 5 MIN PRN
Status: DISCONTINUED | OUTPATIENT
Start: 2025-08-08 | End: 2025-08-08

## 2025-08-08 RX ORDER — NALOXONE HYDROCHLORIDE 0.4 MG/ML
0.1 INJECTION, SOLUTION INTRAMUSCULAR; INTRAVENOUS; SUBCUTANEOUS
Status: DISCONTINUED | OUTPATIENT
Start: 2025-08-08 | End: 2025-08-08

## 2025-08-08 RX ORDER — CEFAZOLIN SODIUM 1 G/3ML
1 INJECTION, POWDER, FOR SOLUTION INTRAMUSCULAR; INTRAVENOUS EVERY 8 HOURS
Status: COMPLETED | OUTPATIENT
Start: 2025-08-08 | End: 2025-08-09

## 2025-08-08 RX ORDER — SODIUM CHLORIDE, SODIUM LACTATE, POTASSIUM CHLORIDE, CALCIUM CHLORIDE 600; 310; 30; 20 MG/100ML; MG/100ML; MG/100ML; MG/100ML
INJECTION, SOLUTION INTRAVENOUS CONTINUOUS
Status: DISCONTINUED | OUTPATIENT
Start: 2025-08-08 | End: 2025-08-08 | Stop reason: HOSPADM

## 2025-08-08 RX ORDER — BISACODYL 10 MG
10 SUPPOSITORY, RECTAL RECTAL DAILY PRN
Status: DISCONTINUED | OUTPATIENT
Start: 2025-08-11 | End: 2025-08-10 | Stop reason: HOSPADM

## 2025-08-08 RX ORDER — METOPROLOL TARTRATE 1 MG/ML
5 INJECTION, SOLUTION INTRAVENOUS ONCE
Status: COMPLETED | OUTPATIENT
Start: 2025-08-08 | End: 2025-08-08

## 2025-08-08 RX ORDER — AMOXICILLIN 250 MG
1 CAPSULE ORAL 2 TIMES DAILY
Status: DISCONTINUED | OUTPATIENT
Start: 2025-08-08 | End: 2025-08-10 | Stop reason: HOSPADM

## 2025-08-08 RX ORDER — LIDOCAINE HYDROCHLORIDE 20 MG/ML
JELLY TOPICAL ONCE
Status: COMPLETED | OUTPATIENT
Start: 2025-08-08 | End: 2025-08-08

## 2025-08-08 RX ORDER — ONDANSETRON 4 MG/1
4 TABLET, ORALLY DISINTEGRATING ORAL EVERY 30 MIN PRN
Status: DISCONTINUED | OUTPATIENT
Start: 2025-08-08 | End: 2025-08-08

## 2025-08-08 RX ADMIN — NICARDIPINE HYDROCHLORIDE 5 MG/HR: 0.2 INJECTION, SOLUTION INTRAVENOUS at 11:10

## 2025-08-08 RX ADMIN — SENNOSIDES AND DOCUSATE SODIUM 1 TABLET: 50; 8.6 TABLET ORAL at 20:41

## 2025-08-08 RX ADMIN — NICARDIPINE HYDROCHLORIDE 14 MG/HR: 0.2 INJECTION, SOLUTION INTRAVENOUS at 15:51

## 2025-08-08 RX ADMIN — ACETAMINOPHEN 975 MG: 325 TABLET ORAL at 20:40

## 2025-08-08 RX ADMIN — ALLOPURINOL 300 MG: 300 TABLET ORAL at 08:43

## 2025-08-08 RX ADMIN — MORPHINE SULFATE 4 MG: 4 INJECTION, SOLUTION INTRAMUSCULAR; INTRAVENOUS at 08:43

## 2025-08-08 RX ADMIN — SODIUM CHLORIDE, SODIUM LACTATE, POTASSIUM CHLORIDE, AND CALCIUM CHLORIDE: .6; .31; .03; .02 INJECTION, SOLUTION INTRAVENOUS at 17:26

## 2025-08-08 RX ADMIN — NICARDIPINE HYDROCHLORIDE 12 MG/HR: 0.2 INJECTION, SOLUTION INTRAVENOUS at 15:22

## 2025-08-08 RX ADMIN — ALLOPURINOL 300 MG: 300 TABLET ORAL at 20:38

## 2025-08-08 RX ADMIN — CEFAZOLIN 1 G: 1 INJECTION, POWDER, FOR SOLUTION INTRAMUSCULAR; INTRAVENOUS at 20:39

## 2025-08-08 RX ADMIN — NICARDIPINE HYDROCHLORIDE 5 MG/HR: 0.2 INJECTION, SOLUTION INTRAVENOUS at 03:41

## 2025-08-08 RX ADMIN — INSULIN ASPART 2 UNITS: 100 INJECTION, SOLUTION INTRAVENOUS; SUBCUTANEOUS at 17:14

## 2025-08-08 RX ADMIN — NICARDIPINE HYDROCHLORIDE 8 MG/HR: 0.2 INJECTION, SOLUTION INTRAVENOUS at 17:27

## 2025-08-08 RX ADMIN — PANTOPRAZOLE SODIUM 40 MG: 40 TABLET, DELAYED RELEASE ORAL at 08:43

## 2025-08-08 RX ADMIN — SODIUM CHLORIDE, SODIUM LACTATE, POTASSIUM CHLORIDE, AND CALCIUM CHLORIDE: .6; .31; .03; .02 INJECTION, SOLUTION INTRAVENOUS at 10:37

## 2025-08-08 RX ADMIN — ROSUVASTATIN 40 MG: 40 TABLET, FILM COATED ORAL at 20:40

## 2025-08-08 RX ADMIN — LIDOCAINE HYDROCHLORIDE: 20 JELLY TOPICAL at 15:42

## 2025-08-08 RX ADMIN — NICARDIPINE HYDROCHLORIDE 10 MG/HR: 0.2 INJECTION, SOLUTION INTRAVENOUS at 14:58

## 2025-08-08 RX ADMIN — METOPROLOL TARTRATE 5 MG: 5 INJECTION INTRAVENOUS at 15:35

## 2025-08-08 RX ADMIN — NICARDIPINE HYDROCHLORIDE 8 MG/HR: 0.2 INJECTION, SOLUTION INTRAVENOUS at 22:04

## 2025-08-08 ASSESSMENT — ACTIVITIES OF DAILY LIVING (ADL)
ADLS_ACUITY_SCORE: 62
ADLS_ACUITY_SCORE: 51
DEPENDENT_IADLS:: CLEANING;COOKING;LAUNDRY;SHOPPING;MEAL PREPARATION;MEDICATION MANAGEMENT;MONEY MANAGEMENT;TRANSPORTATION
ADLS_ACUITY_SCORE: 49
ADLS_ACUITY_SCORE: 51
ADLS_ACUITY_SCORE: 62
ADLS_ACUITY_SCORE: 51
ADLS_ACUITY_SCORE: 62
ADLS_ACUITY_SCORE: 51
ADLS_ACUITY_SCORE: 49
ADLS_ACUITY_SCORE: 49
ADLS_ACUITY_SCORE: 51
ADLS_ACUITY_SCORE: 49
ADLS_ACUITY_SCORE: 49
ADLS_ACUITY_SCORE: 62
ADLS_ACUITY_SCORE: 49

## 2025-08-09 LAB
ANION GAP SERPL CALCULATED.3IONS-SCNC: 11 MMOL/L (ref 7–15)
BUN SERPL-MCNC: 23.1 MG/DL (ref 8–23)
CALCIUM SERPL-MCNC: 8.2 MG/DL (ref 8.8–10.4)
CHLORIDE SERPL-SCNC: 107 MMOL/L (ref 98–107)
CREAT SERPL-MCNC: 0.91 MG/DL (ref 0.67–1.17)
EGFRCR SERPLBLD CKD-EPI 2021: 88 ML/MIN/1.73M2
ERYTHROCYTE [DISTWIDTH] IN BLOOD BY AUTOMATED COUNT: 12.5 % (ref 10–15)
GLUCOSE BLDC GLUCOMTR-MCNC: 117 MG/DL (ref 70–99)
GLUCOSE BLDC GLUCOMTR-MCNC: 159 MG/DL (ref 70–99)
GLUCOSE BLDC GLUCOMTR-MCNC: 160 MG/DL (ref 70–99)
GLUCOSE BLDC GLUCOMTR-MCNC: 175 MG/DL (ref 70–99)
GLUCOSE BLDC GLUCOMTR-MCNC: 215 MG/DL (ref 70–99)
GLUCOSE SERPL-MCNC: 165 MG/DL (ref 70–99)
HCO3 SERPL-SCNC: 22 MMOL/L (ref 22–29)
HCT VFR BLD AUTO: 43.8 % (ref 40–53)
HGB BLD-MCNC: 14.2 G/DL (ref 13.3–17.7)
MCH RBC QN AUTO: 29.9 PG (ref 26.5–33)
MCHC RBC AUTO-ENTMCNC: 32.4 G/DL (ref 31.5–36.5)
MCV RBC AUTO: 92 FL (ref 78–100)
PLATELET # BLD AUTO: 116 10E3/UL (ref 150–450)
POTASSIUM SERPL-SCNC: 4.3 MMOL/L (ref 3.4–5.3)
RBC # BLD AUTO: 4.75 10E6/UL (ref 4.4–5.9)
SODIUM SERPL-SCNC: 140 MMOL/L (ref 135–145)
WBC # BLD AUTO: 8.3 10E3/UL (ref 4–11)

## 2025-08-09 PROCEDURE — 250N000013 HC RX MED GY IP 250 OP 250 PS 637

## 2025-08-09 PROCEDURE — 250N000011 HC RX IP 250 OP 636

## 2025-08-09 PROCEDURE — 80048 BASIC METABOLIC PNL TOTAL CA: CPT

## 2025-08-09 PROCEDURE — 99232 SBSQ HOSP IP/OBS MODERATE 35: CPT | Performed by: INTERNAL MEDICINE

## 2025-08-09 PROCEDURE — 85027 COMPLETE CBC AUTOMATED: CPT

## 2025-08-09 PROCEDURE — 200N000001 HC R&B ICU

## 2025-08-09 PROCEDURE — 250N000011 HC RX IP 250 OP 636: Performed by: INTERNAL MEDICINE

## 2025-08-09 RX ORDER — HEPARIN SODIUM 5000 [USP'U]/.5ML
5000 INJECTION, SOLUTION INTRAVENOUS; SUBCUTANEOUS EVERY 8 HOURS
Status: DISCONTINUED | OUTPATIENT
Start: 2025-08-09 | End: 2025-08-10 | Stop reason: HOSPADM

## 2025-08-09 RX ORDER — METOPROLOL SUCCINATE 25 MG/1
25 TABLET, EXTENDED RELEASE ORAL DAILY
Status: DISCONTINUED | OUTPATIENT
Start: 2025-08-09 | End: 2025-08-10 | Stop reason: HOSPADM

## 2025-08-09 RX ADMIN — ACETAMINOPHEN 975 MG: 325 TABLET ORAL at 05:58

## 2025-08-09 RX ADMIN — NICARDIPINE HYDROCHLORIDE 2.5 MG/HR: 0.2 INJECTION, SOLUTION INTRAVENOUS at 06:10

## 2025-08-09 RX ADMIN — SENNOSIDES AND DOCUSATE SODIUM 1 TABLET: 50; 8.6 TABLET ORAL at 08:49

## 2025-08-09 RX ADMIN — ALLOPURINOL 300 MG: 300 TABLET ORAL at 20:35

## 2025-08-09 RX ADMIN — ACETAMINOPHEN 975 MG: 325 TABLET ORAL at 14:23

## 2025-08-09 RX ADMIN — ACETAMINOPHEN 975 MG: 325 TABLET ORAL at 22:31

## 2025-08-09 RX ADMIN — PANTOPRAZOLE SODIUM 40 MG: 40 TABLET, DELAYED RELEASE ORAL at 08:49

## 2025-08-09 RX ADMIN — ROSUVASTATIN 40 MG: 40 TABLET, FILM COATED ORAL at 20:35

## 2025-08-09 RX ADMIN — METOPROLOL SUCCINATE 25 MG: 25 TABLET, EXTENDED RELEASE ORAL at 11:42

## 2025-08-09 RX ADMIN — SENNOSIDES AND DOCUSATE SODIUM 1 TABLET: 50; 8.6 TABLET ORAL at 20:35

## 2025-08-09 RX ADMIN — POLYETHYLENE GLYCOL 3350 17 G: 17 POWDER, FOR SOLUTION ORAL at 08:49

## 2025-08-09 RX ADMIN — CEFAZOLIN 1 G: 1 INJECTION, POWDER, FOR SOLUTION INTRAMUSCULAR; INTRAVENOUS at 03:47

## 2025-08-09 RX ADMIN — INSULIN ASPART 1 UNITS: 100 INJECTION, SOLUTION INTRAVENOUS; SUBCUTANEOUS at 08:52

## 2025-08-09 RX ADMIN — HEPARIN SODIUM 5000 UNITS: 5000 INJECTION, SOLUTION INTRAVENOUS; SUBCUTANEOUS at 20:35

## 2025-08-09 RX ADMIN — ALLOPURINOL 300 MG: 300 TABLET ORAL at 08:49

## 2025-08-09 ASSESSMENT — ACTIVITIES OF DAILY LIVING (ADL)
ADLS_ACUITY_SCORE: 57
ADLS_ACUITY_SCORE: 57
ADLS_ACUITY_SCORE: 59
ADLS_ACUITY_SCORE: 58
ADLS_ACUITY_SCORE: 58
ADLS_ACUITY_SCORE: 59
ADLS_ACUITY_SCORE: 59
ADLS_ACUITY_SCORE: 67
ADLS_ACUITY_SCORE: 58
ADLS_ACUITY_SCORE: 59
ADLS_ACUITY_SCORE: 67
ADLS_ACUITY_SCORE: 59
ADLS_ACUITY_SCORE: 57
ADLS_ACUITY_SCORE: 59
ADLS_ACUITY_SCORE: 58
ADLS_ACUITY_SCORE: 67
ADLS_ACUITY_SCORE: 59

## 2025-08-10 VITALS
DIASTOLIC BLOOD PRESSURE: 69 MMHG | RESPIRATION RATE: 22 BRPM | HEART RATE: 61 BPM | OXYGEN SATURATION: 95 % | BODY MASS INDEX: 30.43 KG/M2 | SYSTOLIC BLOOD PRESSURE: 143 MMHG | WEIGHT: 171.74 LBS | HEIGHT: 63 IN | TEMPERATURE: 97.7 F

## 2025-08-10 LAB
GLUCOSE BLDC GLUCOMTR-MCNC: 135 MG/DL (ref 70–99)
GLUCOSE BLDC GLUCOMTR-MCNC: 136 MG/DL (ref 70–99)

## 2025-08-10 PROCEDURE — 250N000011 HC RX IP 250 OP 636: Performed by: INTERNAL MEDICINE

## 2025-08-10 PROCEDURE — 99239 HOSP IP/OBS DSCHRG MGMT >30: CPT | Performed by: INTERNAL MEDICINE

## 2025-08-10 PROCEDURE — 250N000013 HC RX MED GY IP 250 OP 250 PS 637

## 2025-08-10 PROCEDURE — 250N000013 HC RX MED GY IP 250 OP 250 PS 637: Performed by: INTERNAL MEDICINE

## 2025-08-10 RX ORDER — LOSARTAN POTASSIUM 50 MG/1
50 TABLET ORAL DAILY
Status: DISCONTINUED | OUTPATIENT
Start: 2025-08-10 | End: 2025-08-10 | Stop reason: HOSPADM

## 2025-08-10 RX ORDER — ISOSORBIDE MONONITRATE 60 MG/1
60 TABLET, EXTENDED RELEASE ORAL EVERY MORNING
Qty: 30 TABLET | Refills: 1 | Status: SHIPPED | OUTPATIENT
Start: 2025-08-10 | End: 2025-08-10

## 2025-08-10 RX ORDER — METOPROLOL SUCCINATE 50 MG/1
50 TABLET, EXTENDED RELEASE ORAL DAILY
Qty: 30 TABLET | Refills: 1 | Status: SHIPPED | OUTPATIENT
Start: 2025-08-10

## 2025-08-10 RX ORDER — ISOSORBIDE MONONITRATE 60 MG/1
60 TABLET, EXTENDED RELEASE ORAL EVERY MORNING
Qty: 30 TABLET | Refills: 1 | Status: SHIPPED | OUTPATIENT
Start: 2025-08-10

## 2025-08-10 RX ORDER — METOPROLOL SUCCINATE 50 MG/1
50 TABLET, EXTENDED RELEASE ORAL DAILY
Qty: 30 TABLET | Refills: 1 | Status: SHIPPED | OUTPATIENT
Start: 2025-08-10 | End: 2025-08-10

## 2025-08-10 RX ADMIN — POLYETHYLENE GLYCOL 3350 17 G: 17 POWDER, FOR SOLUTION ORAL at 08:01

## 2025-08-10 RX ADMIN — HEPARIN SODIUM 5000 UNITS: 5000 INJECTION, SOLUTION INTRAVENOUS; SUBCUTANEOUS at 04:36

## 2025-08-10 RX ADMIN — METOPROLOL SUCCINATE 25 MG: 25 TABLET, EXTENDED RELEASE ORAL at 08:01

## 2025-08-10 RX ADMIN — ASPIRIN 81 MG CHEWABLE TABLET 81 MG: 81 TABLET CHEWABLE at 07:57

## 2025-08-10 RX ADMIN — SENNOSIDES AND DOCUSATE SODIUM 1 TABLET: 50; 8.6 TABLET ORAL at 08:01

## 2025-08-10 RX ADMIN — PANTOPRAZOLE SODIUM 40 MG: 40 TABLET, DELAYED RELEASE ORAL at 07:57

## 2025-08-10 RX ADMIN — ISOSORBIDE MONONITRATE 30 MG: 30 TABLET, EXTENDED RELEASE ORAL at 08:00

## 2025-08-10 RX ADMIN — ACETAMINOPHEN 975 MG: 325 TABLET ORAL at 05:06

## 2025-08-10 RX ADMIN — ALLOPURINOL 300 MG: 300 TABLET ORAL at 08:05

## 2025-08-10 RX ADMIN — ACETAMINOPHEN 975 MG: 325 TABLET ORAL at 13:41

## 2025-08-10 RX ADMIN — LOSARTAN POTASSIUM 50 MG: 50 TABLET, FILM COATED ORAL at 09:27

## 2025-08-10 ASSESSMENT — ACTIVITIES OF DAILY LIVING (ADL)
ADLS_ACUITY_SCORE: 55
ADLS_ACUITY_SCORE: 58
ADLS_ACUITY_SCORE: 55
ADLS_ACUITY_SCORE: 58
ADLS_ACUITY_SCORE: 58
ADLS_ACUITY_SCORE: 55
ADLS_ACUITY_SCORE: 55
ADLS_ACUITY_SCORE: 58
ADLS_ACUITY_SCORE: 58

## 2025-08-11 LAB
ACT BLD: 296 SECONDS (ref 74–150)
ACT BLD: 296 SECONDS (ref 74–150)

## 2025-08-12 LAB
BACTERIA SPEC CULT: NO GROWTH
BACTERIA SPEC CULT: NO GROWTH

## 2025-08-21 ENCOUNTER — HOSPITAL ENCOUNTER (EMERGENCY)
Facility: HOSPITAL | Age: 75
Discharge: HOME | End: 2025-08-21
Attending: EMERGENCY MEDICINE
Payer: COMMERCIAL

## 2025-08-21 VITALS
SYSTOLIC BLOOD PRESSURE: 155 MMHG | RESPIRATION RATE: 24 BRPM | TEMPERATURE: 97.8 F | DIASTOLIC BLOOD PRESSURE: 70 MMHG | WEIGHT: 171 LBS | OXYGEN SATURATION: 99 % | BODY MASS INDEX: 30.3 KG/M2 | HEIGHT: 63 IN | HEART RATE: 51 BPM

## 2025-08-21 DIAGNOSIS — K08.89 PAIN, DENTAL: Primary | ICD-10-CM

## 2025-08-21 LAB
ANION GAP SERPL CALCULATED.3IONS-SCNC: 12 MMOL/L (ref 7–15)
BASOPHILS # BLD AUTO: 0.04 10E3/UL (ref 0–0.2)
BASOPHILS NFR BLD AUTO: 0.4 %
BUN SERPL-MCNC: 27.4 MG/DL (ref 8–23)
CALCIUM SERPL-MCNC: 9 MG/DL (ref 8.8–10.4)
CHLORIDE SERPL-SCNC: 106 MMOL/L (ref 98–107)
CREAT SERPL-MCNC: 0.94 MG/DL (ref 0.67–1.17)
EGFRCR SERPLBLD CKD-EPI 2021: 85 ML/MIN/1.73M2
EOSINOPHIL # BLD AUTO: 0.12 10E3/UL (ref 0–0.7)
EOSINOPHIL NFR BLD AUTO: 1.2 %
ERYTHROCYTE [DISTWIDTH] IN BLOOD BY AUTOMATED COUNT: 13.5 % (ref 10–15)
GLUCOSE SERPL-MCNC: 112 MG/DL (ref 70–99)
HCO3 SERPL-SCNC: 24 MMOL/L (ref 22–29)
HCT VFR BLD AUTO: 44.5 % (ref 40–53)
HGB BLD-MCNC: 14.3 G/DL (ref 13.3–17.7)
HOLD SPECIMEN: NORMAL
IMM GRANULOCYTES # BLD: 0.1 10E3/UL
IMM GRANULOCYTES NFR BLD: 1 %
LYMPHOCYTES # BLD AUTO: 1.85 10E3/UL (ref 0.8–5.3)
LYMPHOCYTES NFR BLD AUTO: 17.8 %
MCH RBC QN AUTO: 30.2 PG (ref 26.5–33)
MCHC RBC AUTO-ENTMCNC: 32.1 G/DL (ref 31.5–36.5)
MCV RBC AUTO: 93.9 FL (ref 78–100)
MONOCYTES # BLD AUTO: 0.81 10E3/UL (ref 0–1.3)
MONOCYTES NFR BLD AUTO: 7.8 %
NEUTROPHILS # BLD AUTO: 7.5 10E3/UL (ref 1.6–8.3)
NEUTROPHILS NFR BLD AUTO: 71.8 %
NRBC # BLD AUTO: <0.03 10E3/UL
NRBC BLD AUTO-RTO: 0 /100
PLATELET # BLD AUTO: 166 10E3/UL (ref 150–450)
POTASSIUM SERPL-SCNC: 4.3 MMOL/L (ref 3.4–5.3)
RBC # BLD AUTO: 4.74 10E6/UL (ref 4.4–5.9)
SODIUM SERPL-SCNC: 142 MMOL/L (ref 135–145)
WBC # BLD AUTO: 10.42 10E3/UL (ref 4–11)

## 2025-08-21 PROCEDURE — 99283 EMERGENCY DEPT VISIT LOW MDM: CPT | Mod: 25 | Performed by: EMERGENCY MEDICINE

## 2025-08-21 PROCEDURE — 85004 AUTOMATED DIFF WBC COUNT: CPT | Performed by: STUDENT IN AN ORGANIZED HEALTH CARE EDUCATION/TRAINING PROGRAM

## 2025-08-21 PROCEDURE — 250N000009 HC RX 250: Performed by: EMERGENCY MEDICINE

## 2025-08-21 PROCEDURE — 80048 BASIC METABOLIC PNL TOTAL CA: CPT | Performed by: STUDENT IN AN ORGANIZED HEALTH CARE EDUCATION/TRAINING PROGRAM

## 2025-08-21 PROCEDURE — 36415 COLL VENOUS BLD VENIPUNCTURE: CPT | Performed by: STUDENT IN AN ORGANIZED HEALTH CARE EDUCATION/TRAINING PROGRAM

## 2025-08-21 PROCEDURE — 64400 NJX AA&/STRD TRIGEMINAL NRV: CPT

## 2025-08-21 RX ORDER — BUPIVACAINE HYDROCHLORIDE AND EPINEPHRINE 5; 5 MG/ML; UG/ML
INJECTION, SOLUTION PERINEURAL
Status: COMPLETED
Start: 2025-08-21 | End: 2025-08-21

## 2025-08-21 RX ORDER — BUPIVACAINE HYDROCHLORIDE AND EPINEPHRINE 5; 5 MG/ML; UG/ML
1.8 INJECTION, SOLUTION PERINEURAL ONCE
Status: COMPLETED | OUTPATIENT
Start: 2025-08-21 | End: 2025-08-21

## 2025-08-21 RX ADMIN — BUPIVACAINE HYDROCHLORIDE AND EPINEPHRINE BITARTRATE 1.8 ML: 5; .005 INJECTION, SOLUTION SUBCUTANEOUS at 06:32

## 2025-08-21 RX ADMIN — BUPIVACAINE HYDROCHLORIDE AND EPINEPHRINE 1.8 ML: 5; 5 INJECTION, SOLUTION PERINEURAL at 06:32

## 2025-08-21 ASSESSMENT — ACTIVITIES OF DAILY LIVING (ADL): ADLS_ACUITY_SCORE: 57

## 2025-08-21 ASSESSMENT — COLUMBIA-SUICIDE SEVERITY RATING SCALE - C-SSRS
2. HAVE YOU ACTUALLY HAD ANY THOUGHTS OF KILLING YOURSELF IN THE PAST MONTH?: NO
1. IN THE PAST MONTH, HAVE YOU WISHED YOU WERE DEAD OR WISHED YOU COULD GO TO SLEEP AND NOT WAKE UP?: NO
6. HAVE YOU EVER DONE ANYTHING, STARTED TO DO ANYTHING, OR PREPARED TO DO ANYTHING TO END YOUR LIFE?: NO

## (undated) DEVICE — SYR KIT ANGIO LT BLUE SALINE 10ML K01-60083

## (undated) DEVICE — CUSTOM PACK GEN MAJOR SBA5BGMHEA

## (undated) DEVICE — CLIP HORIZON SM YELLOW 001200

## (undated) DEVICE — GLOVE SURG GAMMEX PI POLYISOPRENE WHITE SZ 8 LF 20685780

## (undated) DEVICE — CARDIO VASC SHEET 9162

## (undated) DEVICE — SU SILK 4-0 TIE 18' A183H

## (undated) DEVICE — SOLUTION WATER 1000ML BOTTLE R5000-01

## (undated) DEVICE — DRAPE CV SPLIT 110X36" 89452

## (undated) DEVICE — BLADE CLIPPER DISP 4406

## (undated) DEVICE — RAD INFLATOR BASIC COMPAK  IN4130

## (undated) DEVICE — SYR KIT ANGIO YELLOW CONTRAST 10ML  K01-60102

## (undated) DEVICE — SYR ANGIOGRAPHY MULTIUSE KIT ACIST 014612

## (undated) DEVICE — KIT HAND CONTROL ACIST 014644 AR-P54

## (undated) DEVICE — CUSTOM PACK CORONARY SAN5BCRHEA

## (undated) DEVICE — SU MONOCRYL+ 4-0 18IN PS2 UND MCP496G

## (undated) DEVICE — CLOSURE DEVICE 6FR VASC PROGLIDE MEDICATED SUTURE 12673-03

## (undated) DEVICE — INTRO TERUMO 6FRX10CM PINNACLE W/MARKER RSB602

## (undated) DEVICE — DRAPE IOBAN INCISE 36X23" 6651EZ

## (undated) DEVICE — DRAPE U SPLIT 74X120" 29440

## (undated) DEVICE — SYR 10ML LL W/O NDL 302995

## (undated) DEVICE — SLEEVE TR BAND RADIAL COMPRESSION DEVICE 24CM TRB24-REG

## (undated) DEVICE — ELECTRODE DEFIB CADENCE 22550R

## (undated) DEVICE — Device

## (undated) DEVICE — PACK MINOR SINGLE BASIN SSK3001

## (undated) DEVICE — GUIDEWIRE TERUMO .035X180 ANG GR3508

## (undated) DEVICE — ESU GROUND PAD ADULT REM W/15' CORD E7507DB

## (undated) DEVICE — ADH LIQUID MASTISOL TOPICAL VIAL 2-3ML 0523-48

## (undated) DEVICE — GEL ULTRASOUND AQUASONIC 20GM 01-01

## (undated) DEVICE — GLOVE UNDER INDICATOR PI SZ 6.5 LF 41665

## (undated) DEVICE — SYR ANGIO CONTRAST  150-FT-Q

## (undated) DEVICE — NEEDLE HYPO 21GA X 1-1/2 SAFETY 305917

## (undated) DEVICE — PRTC STD XTRMT TRND ARM HKLP CLSR LF DISP TAP100

## (undated) DEVICE — SHAMPOO HAIR AND BODY WASH J&J 117566

## (undated) DEVICE — GOWN SURGICAL SMARTGOWN 2XL 89075

## (undated) DEVICE — DEVICE MULTI TORQUE TD01

## (undated) DEVICE — MITT PRE-OP 7 L X 5 1/2 W 5177M1

## (undated) DEVICE — SYR 50ML LL W/O NDL 309653

## (undated) DEVICE — CATH DIAGNOSTIC RADIAL 5FR TIG 4.0

## (undated) DEVICE — GLIDEWIRE ADVANTAGE 25CM .035 180CM GA3501

## (undated) DEVICE — SUTURE BOOTS 051003PBX

## (undated) DEVICE — PREP CHLORAPREP 26ML TINTED HI-LITE ORANGE 930815

## (undated) DEVICE — TOWEL SURG 16INW X 26INL WHITE STRL XRAY DETECTABLE X8314W

## (undated) DEVICE — WIRE GUIDE LUNDERQUIST 0.035"X260CM DBL CVD

## (undated) DEVICE — RX SURGIFLO HEMOSTATIC MATRIX W/THROMBIN 8ML 2994

## (undated) DEVICE — SU PROLENE 6-0 24" C-1 DA TAPER POINT BLUE 8726H

## (undated) DEVICE — SU DERMABOND ADVANCED .7ML DNX12

## (undated) DEVICE — VIAL DECANTER STERILE WHITE DYNJDEC06

## (undated) DEVICE — STOPCOCK 3-WAY HIGH PRESSURE (NAMIC) 70055009

## (undated) DEVICE — BAG DECANTER STERILE WHITE DYNJDEC09

## (undated) DEVICE — INTRO TERUMO 9FRX10CM W/MARKER RSB902

## (undated) DEVICE — DRAPE STERI FLUOROSCOPE 35X43"1012 LATEX FREE

## (undated) DEVICE — CUSTOM PACK SPEC PROCEDURE SAN5BSPHEA

## (undated) DEVICE — CATH BALLOON RELIANT STENT GRAFT 8FR REL46

## (undated) DEVICE — INTRO MICRO MINI STICK 4FR STIFF NITINOL 45-753

## (undated) DEVICE — KIT TURNOVER FAIRVIEW SOUTHDALE FULL SP3889

## (undated) DEVICE — CLIP HORIZON MED BLUE 002200

## (undated) DEVICE — CATH OMNIFLUSH 5FRX70CM SIZING 13709702

## (undated) DEVICE — SHTH INTRO 0.021IN ID 6FR DIA

## (undated) DEVICE — CATH ANGIO ANG GLIDE 4FRX65CM CG415

## (undated) DEVICE — DRSG TEGADERM 4X4 3/4" 1626W

## (undated) DEVICE — DRAPE STERI TOWEL LG 1010

## (undated) DEVICE — CATH ANGIO KUMPE SOFT-VU 5FRX65CM CVD H787107327015

## (undated) DEVICE — MANIFOLD KIT ANGIO AUTOMATED 014613

## (undated) DEVICE — SOLUTION IV 0.9% NACL 1000ML E8000

## (undated) RX ORDER — FENTANYL CITRATE 50 UG/ML
INJECTION, SOLUTION INTRAMUSCULAR; INTRAVENOUS
Status: DISPENSED
Start: 2024-04-12

## (undated) RX ORDER — HEPARIN SODIUM 1000 [USP'U]/ML
INJECTION, SOLUTION INTRAVENOUS; SUBCUTANEOUS
Status: DISPENSED
Start: 2025-08-08

## (undated) RX ORDER — FENTANYL CITRATE 50 UG/ML
INJECTION, SOLUTION INTRAMUSCULAR; INTRAVENOUS
Status: DISPENSED
Start: 2025-08-08

## (undated) RX ORDER — DIAZEPAM 5 MG
TABLET ORAL
Status: DISPENSED
Start: 2024-04-12

## (undated) RX ORDER — PROTAMINE SULFATE 10 MG/ML
INJECTION, SOLUTION INTRAVENOUS
Status: DISPENSED
Start: 2025-08-08

## (undated) RX ORDER — LIDOCAINE HYDROCHLORIDE AND EPINEPHRINE 10; 10 MG/ML; UG/ML
INJECTION, SOLUTION INFILTRATION; PERINEURAL
Status: DISPENSED
Start: 2025-08-08